# Patient Record
Sex: MALE | Race: WHITE | NOT HISPANIC OR LATINO | Employment: FULL TIME | URBAN - METROPOLITAN AREA
[De-identification: names, ages, dates, MRNs, and addresses within clinical notes are randomized per-mention and may not be internally consistent; named-entity substitution may affect disease eponyms.]

---

## 2017-01-30 ENCOUNTER — AMBULATORY - HEALTHEAST (OUTPATIENT)
Dept: LAB | Facility: CLINIC | Age: 57
End: 2017-01-30

## 2017-01-30 ENCOUNTER — COMMUNICATION - HEALTHEAST (OUTPATIENT)
Dept: TELEHEALTH | Facility: CLINIC | Age: 57
End: 2017-01-30

## 2017-01-30 DIAGNOSIS — L40.50 PSORIATIC ARTHROPATHY (H): ICD-10-CM

## 2017-01-30 LAB
ALT SERPL W P-5'-P-CCNC: 16 U/L (ref 0–45)
CREAT SERPL-MCNC: 0.73 MG/DL (ref 0.7–1.3)
GFR SERPL CREATININE-BSD FRML MDRD: >60 ML/MIN/1.73M2

## 2017-02-19 ENCOUNTER — COMMUNICATION - HEALTHEAST (OUTPATIENT)
Dept: RHEUMATOLOGY | Facility: CLINIC | Age: 57
End: 2017-02-19

## 2017-02-19 DIAGNOSIS — L40.50 PSORIATIC ARTHROPATHY (H): ICD-10-CM

## 2017-02-21 ENCOUNTER — COMMUNICATION - HEALTHEAST (OUTPATIENT)
Dept: ADMINISTRATIVE | Facility: CLINIC | Age: 57
End: 2017-02-21

## 2017-03-17 ENCOUNTER — COMMUNICATION - HEALTHEAST (OUTPATIENT)
Dept: INTERNAL MEDICINE | Facility: CLINIC | Age: 57
End: 2017-03-17

## 2017-03-17 DIAGNOSIS — L40.9 PSORIASIS: ICD-10-CM

## 2017-03-28 ENCOUNTER — OFFICE VISIT - HEALTHEAST (OUTPATIENT)
Dept: INTERNAL MEDICINE | Facility: CLINIC | Age: 57
End: 2017-03-28

## 2017-03-28 DIAGNOSIS — L25.9 DERMATITIS, CONTACT: ICD-10-CM

## 2017-03-28 ASSESSMENT — MIFFLIN-ST. JEOR: SCORE: 1534.74

## 2017-04-10 ENCOUNTER — COMMUNICATION - HEALTHEAST (OUTPATIENT)
Dept: ADMINISTRATIVE | Facility: CLINIC | Age: 57
End: 2017-04-10

## 2017-04-10 ENCOUNTER — AMBULATORY - HEALTHEAST (OUTPATIENT)
Dept: LAB | Facility: CLINIC | Age: 57
End: 2017-04-10

## 2017-04-10 DIAGNOSIS — L40.50 PSORIATIC ARTHROPATHY (H): ICD-10-CM

## 2017-04-10 LAB
ALT SERPL W P-5'-P-CCNC: 15 U/L (ref 0–45)
CREAT SERPL-MCNC: 0.72 MG/DL (ref 0.7–1.3)
GFR SERPL CREATININE-BSD FRML MDRD: >60 ML/MIN/1.73M2

## 2017-04-12 ENCOUNTER — COMMUNICATION - HEALTHEAST (OUTPATIENT)
Dept: RHEUMATOLOGY | Facility: CLINIC | Age: 57
End: 2017-04-12

## 2017-04-12 DIAGNOSIS — L40.50 PSORIATIC ARTHROPATHY (H): ICD-10-CM

## 2017-04-17 ENCOUNTER — RECORDS - HEALTHEAST (OUTPATIENT)
Dept: ADMINISTRATIVE | Facility: OTHER | Age: 57
End: 2017-04-17

## 2017-04-18 ENCOUNTER — COMMUNICATION - HEALTHEAST (OUTPATIENT)
Dept: INTERNAL MEDICINE | Facility: CLINIC | Age: 57
End: 2017-04-18

## 2017-04-20 ENCOUNTER — OFFICE VISIT - HEALTHEAST (OUTPATIENT)
Dept: INTERNAL MEDICINE | Facility: CLINIC | Age: 57
End: 2017-04-20

## 2017-04-20 DIAGNOSIS — Z79.899 HIGH RISK MEDICATION USE: ICD-10-CM

## 2017-04-20 DIAGNOSIS — L40.8 OTHER PSORIASIS: ICD-10-CM

## 2017-04-20 DIAGNOSIS — L40.50 PSORIATIC ARTHROPATHY (H): ICD-10-CM

## 2017-04-20 DIAGNOSIS — M25.50 POLYARTHRALGIA: ICD-10-CM

## 2017-04-20 DIAGNOSIS — H35.713 CENTRAL SEROUS RETINOPATHY, BILATERAL: ICD-10-CM

## 2017-04-20 ASSESSMENT — MIFFLIN-ST. JEOR: SCORE: 1555.61

## 2017-04-24 ENCOUNTER — OFFICE VISIT - HEALTHEAST (OUTPATIENT)
Dept: RHEUMATOLOGY | Facility: CLINIC | Age: 57
End: 2017-04-24

## 2017-04-24 DIAGNOSIS — Z79.899 HIGH RISK MEDICATION USE: ICD-10-CM

## 2017-04-24 DIAGNOSIS — H35.713 CENTRAL SEROUS RETINOPATHY, BILATERAL: ICD-10-CM

## 2017-04-24 DIAGNOSIS — L40.8 OTHER PSORIASIS: ICD-10-CM

## 2017-04-24 DIAGNOSIS — L40.50 PSORIATIC ARTHROPATHY (H): ICD-10-CM

## 2017-05-23 ENCOUNTER — COMMUNICATION - HEALTHEAST (OUTPATIENT)
Dept: ADMINISTRATIVE | Facility: CLINIC | Age: 57
End: 2017-05-23

## 2017-06-05 ENCOUNTER — OFFICE VISIT - HEALTHEAST (OUTPATIENT)
Dept: INTERNAL MEDICINE | Facility: CLINIC | Age: 57
End: 2017-06-05

## 2017-06-05 DIAGNOSIS — F79 INTELLECTUAL FUNCTIONING DISABILITY: ICD-10-CM

## 2017-06-05 DIAGNOSIS — L40.8 OTHER PSORIASIS: ICD-10-CM

## 2017-06-05 DIAGNOSIS — L40.9 PSORIASIS: ICD-10-CM

## 2017-06-05 DIAGNOSIS — L40.50 PSORIATIC ARTHROPATHY (H): ICD-10-CM

## 2017-06-12 ENCOUNTER — COMMUNICATION - HEALTHEAST (OUTPATIENT)
Dept: ADMINISTRATIVE | Facility: CLINIC | Age: 57
End: 2017-06-12

## 2017-06-19 ENCOUNTER — RECORDS - HEALTHEAST (OUTPATIENT)
Dept: ADMINISTRATIVE | Facility: OTHER | Age: 57
End: 2017-06-19

## 2017-07-10 ENCOUNTER — COMMUNICATION - HEALTHEAST (OUTPATIENT)
Dept: RHEUMATOLOGY | Facility: CLINIC | Age: 57
End: 2017-07-10

## 2017-07-10 DIAGNOSIS — L40.50 PSORIATIC ARTHROPATHY (H): ICD-10-CM

## 2017-11-06 ENCOUNTER — RECORDS - HEALTHEAST (OUTPATIENT)
Dept: ADMINISTRATIVE | Facility: OTHER | Age: 57
End: 2017-11-06

## 2017-11-24 ENCOUNTER — OFFICE VISIT - HEALTHEAST (OUTPATIENT)
Dept: INTERNAL MEDICINE | Facility: CLINIC | Age: 57
End: 2017-11-24

## 2017-11-24 DIAGNOSIS — E55.9 VITAMIN D DEFICIENCY: ICD-10-CM

## 2017-11-24 DIAGNOSIS — Z00.00 HEALTHCARE MAINTENANCE: ICD-10-CM

## 2017-11-24 DIAGNOSIS — E78.5 HYPERLIPEMIA: ICD-10-CM

## 2017-11-24 LAB — LDLC SERPL CALC-MCNC: 153 MG/DL

## 2017-11-24 ASSESSMENT — MIFFLIN-ST. JEOR: SCORE: 1568.76

## 2017-11-27 ENCOUNTER — COMMUNICATION - HEALTHEAST (OUTPATIENT)
Dept: INTERNAL MEDICINE | Facility: CLINIC | Age: 57
End: 2017-11-27

## 2018-02-27 ENCOUNTER — OFFICE VISIT - HEALTHEAST (OUTPATIENT)
Dept: INTERNAL MEDICINE | Facility: CLINIC | Age: 58
End: 2018-02-27

## 2018-02-27 DIAGNOSIS — H35.713 CENTRAL SEROUS RETINOPATHY, BILATERAL: ICD-10-CM

## 2018-02-27 DIAGNOSIS — F79 INTELLECTUAL FUNCTIONING DISABILITY: ICD-10-CM

## 2018-02-27 DIAGNOSIS — L40.50 PSORIATIC ARTHROPATHY (H): ICD-10-CM

## 2018-02-27 ASSESSMENT — MIFFLIN-ST. JEOR: SCORE: 1553.74

## 2018-03-12 ENCOUNTER — RECORDS - HEALTHEAST (OUTPATIENT)
Dept: ADMINISTRATIVE | Facility: OTHER | Age: 58
End: 2018-03-12

## 2018-04-23 ENCOUNTER — COMMUNICATION - HEALTHEAST (OUTPATIENT)
Dept: INTERNAL MEDICINE | Facility: CLINIC | Age: 58
End: 2018-04-23

## 2018-05-07 ENCOUNTER — RECORDS - HEALTHEAST (OUTPATIENT)
Dept: ADMINISTRATIVE | Facility: OTHER | Age: 58
End: 2018-05-07

## 2018-05-21 ENCOUNTER — OFFICE VISIT - HEALTHEAST (OUTPATIENT)
Dept: INTERNAL MEDICINE | Facility: CLINIC | Age: 58
End: 2018-05-21

## 2018-05-21 DIAGNOSIS — M25.70 BONE CALLUS: ICD-10-CM

## 2018-05-21 DIAGNOSIS — H54.61 DECREASED VISION OF RIGHT EYE: ICD-10-CM

## 2018-09-10 ENCOUNTER — RECORDS - HEALTHEAST (OUTPATIENT)
Dept: ADMINISTRATIVE | Facility: OTHER | Age: 58
End: 2018-09-10

## 2018-10-02 ENCOUNTER — OFFICE VISIT - HEALTHEAST (OUTPATIENT)
Dept: INTERNAL MEDICINE | Facility: CLINIC | Age: 58
End: 2018-10-02

## 2018-10-02 DIAGNOSIS — F41.1 ANXIETY STATE: ICD-10-CM

## 2018-10-02 DIAGNOSIS — L40.50 PSORIATIC ARTHROPATHY (H): ICD-10-CM

## 2018-10-02 DIAGNOSIS — Z01.30 BLOOD PRESSURE CHECK: ICD-10-CM

## 2018-10-02 DIAGNOSIS — E78.5 HYPERLIPEMIA: ICD-10-CM

## 2018-10-02 DIAGNOSIS — H54.61 DECREASED VISION OF RIGHT EYE: ICD-10-CM

## 2018-10-02 DIAGNOSIS — H35.713 CENTRAL SEROUS RETINOPATHY, BILATERAL: ICD-10-CM

## 2018-10-02 LAB
ALBUMIN SERPL-MCNC: 3.9 G/DL (ref 3.5–5)
ALP SERPL-CCNC: 64 U/L (ref 45–120)
ALT SERPL W P-5'-P-CCNC: 15 U/L (ref 0–45)
ANION GAP SERPL CALCULATED.3IONS-SCNC: 10 MMOL/L (ref 5–18)
AST SERPL W P-5'-P-CCNC: 24 U/L (ref 0–40)
BILIRUB SERPL-MCNC: 0.4 MG/DL (ref 0–1)
BUN SERPL-MCNC: 28 MG/DL (ref 8–22)
CALCIUM SERPL-MCNC: 9.4 MG/DL (ref 8.5–10.5)
CHLORIDE BLD-SCNC: 103 MMOL/L (ref 98–107)
CO2 SERPL-SCNC: 26 MMOL/L (ref 22–31)
CREAT SERPL-MCNC: 0.88 MG/DL (ref 0.7–1.3)
ERYTHROCYTE [DISTWIDTH] IN BLOOD BY AUTOMATED COUNT: 12.4 % (ref 11–14.5)
GFR SERPL CREATININE-BSD FRML MDRD: >60 ML/MIN/1.73M2
GLUCOSE BLD-MCNC: 96 MG/DL (ref 70–125)
HCT VFR BLD AUTO: 41.9 % (ref 40–54)
HGB BLD-MCNC: 13.6 G/DL (ref 14–18)
MCH RBC QN AUTO: 28.4 PG (ref 27–34)
MCHC RBC AUTO-ENTMCNC: 32.5 G/DL (ref 32–36)
MCV RBC AUTO: 88 FL (ref 80–100)
PLATELET # BLD AUTO: 294 THOU/UL (ref 140–440)
PMV BLD AUTO: 9.9 FL (ref 8.5–12.5)
POTASSIUM BLD-SCNC: 4.2 MMOL/L (ref 3.5–5)
PROT SERPL-MCNC: 7.3 G/DL (ref 6–8)
RBC # BLD AUTO: 4.79 MILL/UL (ref 4.4–6.2)
SODIUM SERPL-SCNC: 139 MMOL/L (ref 136–145)
WBC: 6.6 THOU/UL (ref 4–11)

## 2018-10-03 ENCOUNTER — COMMUNICATION - HEALTHEAST (OUTPATIENT)
Dept: INTERNAL MEDICINE | Facility: CLINIC | Age: 58
End: 2018-10-03

## 2019-01-09 ENCOUNTER — RECORDS - HEALTHEAST (OUTPATIENT)
Dept: LAB | Facility: CLINIC | Age: 59
End: 2019-01-09

## 2019-01-09 LAB
HBV SURFACE AG SERPL QL IA: NEGATIVE
HCV AB SERPL QL IA: NEGATIVE
HIV 1+2 AB+HIV1 P24 AG SERPL QL IA: NEGATIVE

## 2019-01-28 ENCOUNTER — OFFICE VISIT - HEALTHEAST (OUTPATIENT)
Dept: INTERNAL MEDICINE | Facility: CLINIC | Age: 59
End: 2019-01-28

## 2019-01-28 DIAGNOSIS — L40.8 OTHER PSORIASIS: ICD-10-CM

## 2019-01-28 DIAGNOSIS — L40.50 PSORIATIC ARTHROPATHY (H): ICD-10-CM

## 2019-01-28 DIAGNOSIS — Z12.5 SCREENING FOR PROSTATE CANCER: ICD-10-CM

## 2019-01-28 DIAGNOSIS — F41.1 ANXIETY STATE: ICD-10-CM

## 2019-01-28 LAB
ALBUMIN SERPL-MCNC: 4 G/DL (ref 3.5–5)
ALBUMIN UR-MCNC: NEGATIVE MG/DL
ALP SERPL-CCNC: 71 U/L (ref 45–120)
ALT SERPL W P-5'-P-CCNC: 12 U/L (ref 0–45)
ANION GAP SERPL CALCULATED.3IONS-SCNC: 10 MMOL/L (ref 5–18)
APPEARANCE UR: CLEAR
AST SERPL W P-5'-P-CCNC: 20 U/L (ref 0–40)
BACTERIA #/AREA URNS HPF: ABNORMAL HPF
BILIRUB SERPL-MCNC: 0.6 MG/DL (ref 0–1)
BILIRUB UR QL STRIP: NEGATIVE
BUN SERPL-MCNC: 25 MG/DL (ref 8–22)
C REACTIVE PROTEIN LHE: 0.2 MG/DL (ref 0–0.8)
CALCIUM SERPL-MCNC: 9.4 MG/DL (ref 8.5–10.5)
CHLORIDE BLD-SCNC: 104 MMOL/L (ref 98–107)
CO2 SERPL-SCNC: 29 MMOL/L (ref 22–31)
COLOR UR AUTO: YELLOW
CREAT SERPL-MCNC: 0.81 MG/DL (ref 0.7–1.3)
ERYTHROCYTE [DISTWIDTH] IN BLOOD BY AUTOMATED COUNT: 12.2 % (ref 11–14.5)
ERYTHROCYTE [SEDIMENTATION RATE] IN BLOOD BY WESTERGREN METHOD: 9 MM/HR (ref 0–15)
GFR SERPL CREATININE-BSD FRML MDRD: >60 ML/MIN/1.73M2
GLUCOSE BLD-MCNC: 62 MG/DL (ref 70–125)
GLUCOSE UR STRIP-MCNC: NEGATIVE MG/DL
HCT VFR BLD AUTO: 42.4 % (ref 40–54)
HGB BLD-MCNC: 14.3 G/DL (ref 14–18)
HGB UR QL STRIP: ABNORMAL
KETONES UR STRIP-MCNC: NEGATIVE MG/DL
LEUKOCYTE ESTERASE UR QL STRIP: NEGATIVE
MCH RBC QN AUTO: 28.6 PG (ref 27–34)
MCHC RBC AUTO-ENTMCNC: 33.8 G/DL (ref 32–36)
MCV RBC AUTO: 85 FL (ref 80–100)
NITRATE UR QL: NEGATIVE
PH UR STRIP: 6 [PH] (ref 5–8)
PLATELET # BLD AUTO: 290 THOU/UL (ref 140–440)
PMV BLD AUTO: 7.2 FL (ref 7–10)
POTASSIUM BLD-SCNC: 4.3 MMOL/L (ref 3.5–5)
PROT SERPL-MCNC: 7.3 G/DL (ref 6–8)
PSA SERPL-MCNC: 1.7 NG/ML (ref 0–3.5)
RBC # BLD AUTO: 5.01 MILL/UL (ref 4.4–6.2)
RBC #/AREA URNS AUTO: ABNORMAL HPF
SODIUM SERPL-SCNC: 143 MMOL/L (ref 136–145)
SP GR UR STRIP: >=1.03 (ref 1–1.03)
SQUAMOUS #/AREA URNS AUTO: ABNORMAL LPF
UROBILINOGEN UR STRIP-ACNC: ABNORMAL
WBC #/AREA URNS AUTO: ABNORMAL HPF
WBC: 5.9 THOU/UL (ref 4–11)

## 2019-01-28 ASSESSMENT — MIFFLIN-ST. JEOR: SCORE: 1579.24

## 2019-01-29 ENCOUNTER — COMMUNICATION - HEALTHEAST (OUTPATIENT)
Dept: INTERNAL MEDICINE | Facility: CLINIC | Age: 59
End: 2019-01-29

## 2019-02-25 ENCOUNTER — RECORDS - HEALTHEAST (OUTPATIENT)
Dept: ADMINISTRATIVE | Facility: OTHER | Age: 59
End: 2019-02-25

## 2019-03-07 ENCOUNTER — OFFICE VISIT - HEALTHEAST (OUTPATIENT)
Dept: INTERNAL MEDICINE | Facility: CLINIC | Age: 59
End: 2019-03-07

## 2019-03-07 DIAGNOSIS — Z23 NEED FOR TD VACCINE: ICD-10-CM

## 2019-03-07 DIAGNOSIS — H35.713 CENTRAL SEROUS RETINOPATHY, BILATERAL: ICD-10-CM

## 2019-03-07 DIAGNOSIS — H54.61 DECREASED VISION OF RIGHT EYE: ICD-10-CM

## 2019-03-07 DIAGNOSIS — L40.50 PSORIATIC ARTHROPATHY (H): ICD-10-CM

## 2019-03-12 ENCOUNTER — COMMUNICATION - HEALTHEAST (OUTPATIENT)
Dept: INTERNAL MEDICINE | Facility: CLINIC | Age: 59
End: 2019-03-12

## 2019-04-04 ENCOUNTER — OFFICE VISIT - HEALTHEAST (OUTPATIENT)
Dept: INTERNAL MEDICINE | Facility: CLINIC | Age: 59
End: 2019-04-04

## 2019-04-04 DIAGNOSIS — H35.713 CENTRAL SEROUS RETINOPATHY, BILATERAL: ICD-10-CM

## 2019-04-04 DIAGNOSIS — K58.8 OTHER IRRITABLE BOWEL SYNDROME: ICD-10-CM

## 2019-04-04 LAB
ANION GAP SERPL CALCULATED.3IONS-SCNC: 9 MMOL/L (ref 5–18)
BUN SERPL-MCNC: 28 MG/DL (ref 8–22)
CALCIUM SERPL-MCNC: 9.6 MG/DL (ref 8.5–10.5)
CHLORIDE BLD-SCNC: 103 MMOL/L (ref 98–107)
CO2 SERPL-SCNC: 29 MMOL/L (ref 22–31)
CREAT SERPL-MCNC: 0.8 MG/DL (ref 0.7–1.3)
GFR SERPL CREATININE-BSD FRML MDRD: >60 ML/MIN/1.73M2
GLUCOSE BLD-MCNC: 60 MG/DL (ref 70–125)
POTASSIUM BLD-SCNC: 4.7 MMOL/L (ref 3.5–5)
SODIUM SERPL-SCNC: 141 MMOL/L (ref 136–145)

## 2019-04-04 ASSESSMENT — MIFFLIN-ST. JEOR: SCORE: 1558.32

## 2019-05-02 ENCOUNTER — OFFICE VISIT - HEALTHEAST (OUTPATIENT)
Dept: INTERNAL MEDICINE | Facility: CLINIC | Age: 59
End: 2019-05-02

## 2019-05-02 DIAGNOSIS — L40.50 PSORIATIC ARTHROPATHY (H): ICD-10-CM

## 2019-05-02 ASSESSMENT — MIFFLIN-ST. JEOR: SCORE: 1530.21

## 2019-05-06 ENCOUNTER — RECORDS - HEALTHEAST (OUTPATIENT)
Dept: ADMINISTRATIVE | Facility: OTHER | Age: 59
End: 2019-05-06

## 2019-05-30 ENCOUNTER — OFFICE VISIT - HEALTHEAST (OUTPATIENT)
Dept: INTERNAL MEDICINE | Facility: CLINIC | Age: 59
End: 2019-05-30

## 2019-05-30 DIAGNOSIS — R25.2 MUSCLE CRAMPING: ICD-10-CM

## 2019-05-30 DIAGNOSIS — H35.713 CENTRAL SEROUS RETINOPATHY, BILATERAL: ICD-10-CM

## 2019-05-30 DIAGNOSIS — L40.50 PSORIATIC ARTHROPATHY (H): ICD-10-CM

## 2019-05-30 ASSESSMENT — MIFFLIN-ST. JEOR: SCORE: 1557.42

## 2019-07-08 ENCOUNTER — RECORDS - HEALTHEAST (OUTPATIENT)
Dept: ADMINISTRATIVE | Facility: OTHER | Age: 59
End: 2019-07-08

## 2019-07-11 ENCOUNTER — OFFICE VISIT - HEALTHEAST (OUTPATIENT)
Dept: INTERNAL MEDICINE | Facility: CLINIC | Age: 59
End: 2019-07-11

## 2019-07-11 ENCOUNTER — OFFICE VISIT - HEALTHEAST (OUTPATIENT)
Dept: RHEUMATOLOGY | Facility: CLINIC | Age: 59
End: 2019-07-11

## 2019-07-11 ENCOUNTER — RECORDS - HEALTHEAST (OUTPATIENT)
Dept: GENERAL RADIOLOGY | Facility: CLINIC | Age: 59
End: 2019-07-11

## 2019-07-11 DIAGNOSIS — R25.2 MUSCLE CRAMPS AT NIGHT: ICD-10-CM

## 2019-07-11 DIAGNOSIS — L40.50 PSORIATIC ARTHRITIS (H): ICD-10-CM

## 2019-07-11 DIAGNOSIS — L40.50 ARTHROPATHIC PSORIASIS, UNSPECIFIED (H): ICD-10-CM

## 2019-07-11 DIAGNOSIS — M79.641 PAIN IN BOTH HANDS: ICD-10-CM

## 2019-07-11 DIAGNOSIS — Z79.1 NSAID LONG-TERM USE: ICD-10-CM

## 2019-07-11 DIAGNOSIS — M79.642 PAIN IN BOTH HANDS: ICD-10-CM

## 2019-07-11 DIAGNOSIS — L40.9 PSORIASIS: ICD-10-CM

## 2019-07-11 DIAGNOSIS — M79.642 PAIN IN LEFT HAND: ICD-10-CM

## 2019-07-11 DIAGNOSIS — H35.713 CENTRAL SEROUS RETINOPATHY, BILATERAL: ICD-10-CM

## 2019-07-11 DIAGNOSIS — L40.50 PSORIATIC ARTHROPATHY (H): ICD-10-CM

## 2019-07-11 DIAGNOSIS — M79.641 PAIN IN RIGHT HAND: ICD-10-CM

## 2019-07-11 LAB
ALBUMIN SERPL-MCNC: 4 G/DL (ref 3.5–5)
ALT SERPL W P-5'-P-CCNC: 14 U/L (ref 0–45)
ANION GAP SERPL CALCULATED.3IONS-SCNC: 12 MMOL/L (ref 5–18)
AST SERPL W P-5'-P-CCNC: 21 U/L (ref 0–40)
BUN SERPL-MCNC: 26 MG/DL (ref 8–22)
C REACTIVE PROTEIN LHE: 0.1 MG/DL (ref 0–0.8)
CALCIUM SERPL-MCNC: 9.6 MG/DL (ref 8.5–10.5)
CHLORIDE BLD-SCNC: 102 MMOL/L (ref 98–107)
CO2 SERPL-SCNC: 23 MMOL/L (ref 22–31)
CREAT SERPL-MCNC: 0.86 MG/DL (ref 0.7–1.3)
ERYTHROCYTE [SEDIMENTATION RATE] IN BLOOD BY WESTERGREN METHOD: 8 MM/HR (ref 0–15)
GFR SERPL CREATININE-BSD FRML MDRD: >60 ML/MIN/1.73M2
GLUCOSE BLD-MCNC: 122 MG/DL (ref 70–125)
POTASSIUM BLD-SCNC: 4.1 MMOL/L (ref 3.5–5)
SODIUM SERPL-SCNC: 137 MMOL/L (ref 136–145)

## 2019-07-11 ASSESSMENT — MIFFLIN-ST. JEOR: SCORE: 1554.3

## 2019-07-15 ENCOUNTER — COMMUNICATION - HEALTHEAST (OUTPATIENT)
Dept: RHEUMATOLOGY | Facility: CLINIC | Age: 59
End: 2019-07-15

## 2019-07-16 LAB
B LOCUS: NORMAL
B27TEST METHOD: NORMAL

## 2019-07-22 ENCOUNTER — COMMUNICATION - HEALTHEAST (OUTPATIENT)
Dept: RHEUMATOLOGY | Facility: CLINIC | Age: 59
End: 2019-07-22

## 2019-07-25 ENCOUNTER — OFFICE VISIT - HEALTHEAST (OUTPATIENT)
Dept: INTERNAL MEDICINE | Facility: CLINIC | Age: 59
End: 2019-07-25

## 2019-07-25 DIAGNOSIS — H35.713 CENTRAL SEROUS RETINOPATHY, BILATERAL: ICD-10-CM

## 2019-07-25 DIAGNOSIS — M79.641 PAIN IN BOTH HANDS: ICD-10-CM

## 2019-07-25 DIAGNOSIS — L40.50 PSORIATIC ARTHRITIS (H): ICD-10-CM

## 2019-07-25 DIAGNOSIS — M79.642 PAIN IN BOTH HANDS: ICD-10-CM

## 2019-07-25 ASSESSMENT — MIFFLIN-ST. JEOR: SCORE: 1552.89

## 2019-07-29 ENCOUNTER — COMMUNICATION - HEALTHEAST (OUTPATIENT)
Dept: INTERNAL MEDICINE | Facility: CLINIC | Age: 59
End: 2019-07-29

## 2019-08-16 ENCOUNTER — COMMUNICATION - HEALTHEAST (OUTPATIENT)
Dept: SCHEDULING | Facility: CLINIC | Age: 59
End: 2019-08-16

## 2019-08-22 ENCOUNTER — OFFICE VISIT - HEALTHEAST (OUTPATIENT)
Dept: INTERNAL MEDICINE | Facility: CLINIC | Age: 59
End: 2019-08-22

## 2019-08-22 DIAGNOSIS — H35.713 CENTRAL SEROUS RETINOPATHY, BILATERAL: ICD-10-CM

## 2019-08-22 DIAGNOSIS — L40.50 PSORIATIC ARTHROPATHY (H): ICD-10-CM

## 2019-08-22 DIAGNOSIS — R25.2 MUSCLE CRAMPING: ICD-10-CM

## 2019-08-22 LAB
ANION GAP SERPL CALCULATED.3IONS-SCNC: 8 MMOL/L (ref 5–18)
BUN SERPL-MCNC: 31 MG/DL (ref 8–22)
CALCIUM SERPL-MCNC: 9.3 MG/DL (ref 8.5–10.5)
CHLORIDE BLD-SCNC: 103 MMOL/L (ref 98–107)
CO2 SERPL-SCNC: 30 MMOL/L (ref 22–31)
CREAT SERPL-MCNC: 0.79 MG/DL (ref 0.7–1.3)
GFR SERPL CREATININE-BSD FRML MDRD: >60 ML/MIN/1.73M2
GLUCOSE BLD-MCNC: 73 MG/DL (ref 70–125)
MAGNESIUM SERPL-MCNC: 1.8 MG/DL (ref 1.8–2.6)
POTASSIUM BLD-SCNC: 4.7 MMOL/L (ref 3.5–5)
SODIUM SERPL-SCNC: 141 MMOL/L (ref 136–145)

## 2019-08-22 ASSESSMENT — MIFFLIN-ST. JEOR: SCORE: 1548.92

## 2019-10-07 ENCOUNTER — COMMUNICATION - HEALTHEAST (OUTPATIENT)
Dept: LAB | Facility: CLINIC | Age: 59
End: 2019-10-07

## 2019-10-07 ENCOUNTER — AMBULATORY - HEALTHEAST (OUTPATIENT)
Dept: LAB | Facility: CLINIC | Age: 59
End: 2019-10-07

## 2019-10-07 DIAGNOSIS — Z13.220 SCREENING FOR HYPERCHOLESTEROLEMIA: ICD-10-CM

## 2019-10-07 DIAGNOSIS — L40.50 PSORIATIC ARTHRITIS (H): ICD-10-CM

## 2019-10-07 DIAGNOSIS — Z13.220 SCREENING FOR HYPERLIPIDEMIA: ICD-10-CM

## 2019-10-07 DIAGNOSIS — Z79.1 NSAID LONG-TERM USE: ICD-10-CM

## 2019-10-07 LAB
ALBUMIN SERPL-MCNC: 3.9 G/DL (ref 3.5–5)
ALT SERPL W P-5'-P-CCNC: 16 U/L (ref 0–45)
AST SERPL W P-5'-P-CCNC: 22 U/L (ref 0–40)
CREAT SERPL-MCNC: 0.79 MG/DL (ref 0.7–1.3)
GFR SERPL CREATININE-BSD FRML MDRD: >60 ML/MIN/1.73M2

## 2019-10-14 ENCOUNTER — COMMUNICATION - HEALTHEAST (OUTPATIENT)
Dept: RHEUMATOLOGY | Facility: CLINIC | Age: 59
End: 2019-10-14

## 2019-10-17 ENCOUNTER — AMBULATORY - HEALTHEAST (OUTPATIENT)
Dept: LAB | Facility: CLINIC | Age: 59
End: 2019-10-17

## 2019-10-17 DIAGNOSIS — Z13.220 SCREENING FOR LIPOID DISORDERS: ICD-10-CM

## 2019-10-17 LAB
CHOLEST SERPL-MCNC: 228 MG/DL
FASTING STATUS PATIENT QL REPORTED: YES
HDLC SERPL-MCNC: 55 MG/DL
LDLC SERPL CALC-MCNC: 158 MG/DL
TRIGL SERPL-MCNC: 77 MG/DL

## 2019-10-25 ENCOUNTER — COMMUNICATION - HEALTHEAST (OUTPATIENT)
Dept: RHEUMATOLOGY | Facility: CLINIC | Age: 59
End: 2019-10-25

## 2019-10-25 DIAGNOSIS — M79.641 PAIN IN BOTH HANDS: ICD-10-CM

## 2019-10-25 DIAGNOSIS — M79.642 PAIN IN BOTH HANDS: ICD-10-CM

## 2019-10-25 DIAGNOSIS — L40.50 PSORIATIC ARTHRITIS (H): ICD-10-CM

## 2019-10-31 ENCOUNTER — COMMUNICATION - HEALTHEAST (OUTPATIENT)
Dept: SCHEDULING | Facility: CLINIC | Age: 59
End: 2019-10-31

## 2019-10-31 ENCOUNTER — OFFICE VISIT - HEALTHEAST (OUTPATIENT)
Dept: RHEUMATOLOGY | Facility: CLINIC | Age: 59
End: 2019-10-31

## 2019-10-31 ENCOUNTER — OFFICE VISIT - HEALTHEAST (OUTPATIENT)
Dept: INTERNAL MEDICINE | Facility: CLINIC | Age: 59
End: 2019-10-31

## 2019-10-31 DIAGNOSIS — H35.713 CENTRAL SEROUS RETINOPATHY, BILATERAL: ICD-10-CM

## 2019-10-31 DIAGNOSIS — Z79.1 NSAID LONG-TERM USE: ICD-10-CM

## 2019-10-31 DIAGNOSIS — L40.50 PSORIATIC ARTHROPATHY (H): ICD-10-CM

## 2019-10-31 DIAGNOSIS — L40.50 PSORIATIC ARTHRITIS (H): ICD-10-CM

## 2019-10-31 DIAGNOSIS — Z87.898 HISTORY OF POLYURIA: ICD-10-CM

## 2019-10-31 DIAGNOSIS — L40.9 PSORIASIS: ICD-10-CM

## 2019-10-31 DIAGNOSIS — R25.2 MUSCLE CRAMPS AT NIGHT: ICD-10-CM

## 2019-10-31 DIAGNOSIS — E78.5 HYPERLIPIDEMIA LDL GOAL <130: ICD-10-CM

## 2019-10-31 DIAGNOSIS — F79 INTELLECTUAL FUNCTIONING DISABILITY: ICD-10-CM

## 2019-10-31 LAB
ALBUMIN UR-MCNC: NEGATIVE MG/DL
ANION GAP SERPL CALCULATED.3IONS-SCNC: 10 MMOL/L (ref 5–18)
APPEARANCE UR: CLEAR
BILIRUB UR QL STRIP: NEGATIVE
BUN SERPL-MCNC: 28 MG/DL (ref 8–22)
CALCIUM SERPL-MCNC: 9.7 MG/DL (ref 8.5–10.5)
CHLORIDE BLD-SCNC: 100 MMOL/L (ref 98–107)
CO2 SERPL-SCNC: 29 MMOL/L (ref 22–31)
COLOR UR AUTO: YELLOW
CREAT SERPL-MCNC: 0.81 MG/DL (ref 0.7–1.3)
GFR SERPL CREATININE-BSD FRML MDRD: >60 ML/MIN/1.73M2
GLUCOSE BLD-MCNC: 54 MG/DL (ref 70–125)
GLUCOSE UR STRIP-MCNC: NEGATIVE MG/DL
HGB UR QL STRIP: NEGATIVE
KETONES UR STRIP-MCNC: ABNORMAL MG/DL
LEUKOCYTE ESTERASE UR QL STRIP: NEGATIVE
NITRATE UR QL: NEGATIVE
PH UR STRIP: 7 [PH] (ref 5–8)
POTASSIUM BLD-SCNC: 5.4 MMOL/L (ref 3.5–5)
SODIUM SERPL-SCNC: 139 MMOL/L (ref 136–145)
SP GR UR STRIP: 1.02 (ref 1–1.03)
UROBILINOGEN UR STRIP-ACNC: ABNORMAL

## 2019-10-31 ASSESSMENT — MIFFLIN-ST. JEOR
SCORE: 1556.06
SCORE: 1552.89

## 2019-12-03 ENCOUNTER — OFFICE VISIT - HEALTHEAST (OUTPATIENT)
Dept: INTERNAL MEDICINE | Facility: CLINIC | Age: 59
End: 2019-12-03

## 2019-12-03 ENCOUNTER — COMMUNICATION - HEALTHEAST (OUTPATIENT)
Dept: RHEUMATOLOGY | Facility: CLINIC | Age: 59
End: 2019-12-03

## 2019-12-03 DIAGNOSIS — R25.2 MUSCLE CRAMP: ICD-10-CM

## 2019-12-03 DIAGNOSIS — G62.9 PERIPHERAL POLYNEUROPATHY: ICD-10-CM

## 2019-12-03 DIAGNOSIS — M79.642 PAIN IN BOTH HANDS: ICD-10-CM

## 2019-12-03 DIAGNOSIS — L40.50 PSORIATIC ARTHRITIS (H): ICD-10-CM

## 2019-12-03 DIAGNOSIS — M79.641 PAIN IN BOTH HANDS: ICD-10-CM

## 2019-12-03 LAB
HBA1C MFR BLD: 5.7 % (ref 3.5–6)
MAGNESIUM SERPL-MCNC: 1.9 MG/DL (ref 1.8–2.6)
T4 FREE SERPL-MCNC: 0.9 NG/DL (ref 0.7–1.8)
TSH SERPL DL<=0.005 MIU/L-ACNC: 2.99 UIU/ML (ref 0.3–5)
VIT B12 SERPL-MCNC: 400 PG/ML (ref 213–816)

## 2019-12-03 ASSESSMENT — MIFFLIN-ST. JEOR: SCORE: 1561.96

## 2019-12-04 LAB — B BURGDOR IGG+IGM SER QL: 0.07 INDEX VALUE

## 2019-12-05 ENCOUNTER — COMMUNICATION - HEALTHEAST (OUTPATIENT)
Dept: INTERNAL MEDICINE | Facility: CLINIC | Age: 59
End: 2019-12-05

## 2019-12-05 LAB
ALBUMIN PERCENT: 54.2 % (ref 51–67)
ALBUMIN SERPL ELPH-MCNC: 4 G/DL (ref 3.2–4.7)
ALPHA 1 PERCENT: 3.1 % (ref 2–4)
ALPHA 2 PERCENT: 13.8 % (ref 5–13)
ALPHA1 GLOB SERPL ELPH-MCNC: 0.2 G/DL (ref 0.1–0.3)
ALPHA2 GLOB SERPL ELPH-MCNC: 1 G/DL (ref 0.4–0.9)
B-GLOBULIN SERPL ELPH-MCNC: 0.9 G/DL (ref 0.7–1.2)
BETA PERCENT: 12.8 % (ref 10–17)
GAMMA GLOB SERPL ELPH-MCNC: 1.2 G/DL (ref 0.6–1.4)
GAMMA GLOBULIN PERCENT: 16.1 % (ref 9–20)
PATH ICD:: ABNORMAL
PROT PATTERN SERPL ELPH-IMP: ABNORMAL
PROT SERPL-MCNC: 7.3 G/DL (ref 6–8)
REVIEWING PATHOLOGIST: ABNORMAL

## 2019-12-10 ENCOUNTER — COMMUNICATION - HEALTHEAST (OUTPATIENT)
Dept: INTERNAL MEDICINE | Facility: CLINIC | Age: 59
End: 2019-12-10

## 2019-12-17 ENCOUNTER — RECORDS - HEALTHEAST (OUTPATIENT)
Dept: ADMINISTRATIVE | Facility: OTHER | Age: 59
End: 2019-12-17

## 2020-01-07 ENCOUNTER — HOSPITAL ENCOUNTER (OUTPATIENT)
Dept: PHYSICAL MEDICINE AND REHAB | Facility: CLINIC | Age: 60
Discharge: HOME | End: 2020-01-07
Attending: INTERNAL MEDICINE

## 2020-01-07 DIAGNOSIS — G62.9 PERIPHERAL POLYNEUROPATHY: ICD-10-CM

## 2020-01-14 ENCOUNTER — RECORDS - HEALTHEAST (OUTPATIENT)
Dept: ADMINISTRATIVE | Facility: OTHER | Age: 60
End: 2020-01-14

## 2020-01-22 ENCOUNTER — OFFICE VISIT - HEALTHEAST (OUTPATIENT)
Dept: INTERNAL MEDICINE | Facility: CLINIC | Age: 60
End: 2020-01-22

## 2020-01-22 DIAGNOSIS — H35.713 CENTRAL SEROUS CHORIORETINOPATHY OF BOTH EYES: ICD-10-CM

## 2020-01-22 DIAGNOSIS — L40.50 PSORIATIC ARTHRITIS (H): ICD-10-CM

## 2020-01-22 DIAGNOSIS — M79.641 PAIN IN BOTH HANDS: ICD-10-CM

## 2020-01-22 DIAGNOSIS — G62.9 PERIPHERAL POLYNEUROPATHY: ICD-10-CM

## 2020-01-22 DIAGNOSIS — N64.4 BREAST TENDERNESS IN MALE: ICD-10-CM

## 2020-01-22 DIAGNOSIS — M79.642 PAIN IN BOTH HANDS: ICD-10-CM

## 2020-01-22 LAB
ANION GAP SERPL CALCULATED.3IONS-SCNC: 7 MMOL/L (ref 5–18)
BUN SERPL-MCNC: 26 MG/DL (ref 8–22)
CALCIUM SERPL-MCNC: 9.6 MG/DL (ref 8.5–10.5)
CHLORIDE BLD-SCNC: 102 MMOL/L (ref 98–107)
CO2 SERPL-SCNC: 33 MMOL/L (ref 22–31)
CREAT SERPL-MCNC: 0.78 MG/DL (ref 0.7–1.3)
GFR SERPL CREATININE-BSD FRML MDRD: >60 ML/MIN/1.73M2
GLUCOSE BLD-MCNC: 80 MG/DL (ref 70–125)
POTASSIUM BLD-SCNC: 5.2 MMOL/L (ref 3.5–5)
SODIUM SERPL-SCNC: 142 MMOL/L (ref 136–145)

## 2020-01-22 ASSESSMENT — MIFFLIN-ST. JEOR: SCORE: 1563.94

## 2020-03-23 ENCOUNTER — COMMUNICATION - HEALTHEAST (OUTPATIENT)
Dept: INTERNAL MEDICINE | Facility: CLINIC | Age: 60
End: 2020-03-23

## 2020-03-27 ENCOUNTER — AMBULATORY - HEALTHEAST (OUTPATIENT)
Dept: LAB | Facility: CLINIC | Age: 60
End: 2020-03-27

## 2020-03-27 DIAGNOSIS — Z79.1 NSAID LONG-TERM USE: ICD-10-CM

## 2020-03-27 DIAGNOSIS — L40.50 PSORIATIC ARTHRITIS (H): ICD-10-CM

## 2020-03-27 LAB
ALBUMIN SERPL-MCNC: 3.8 G/DL (ref 3.5–5)
ALT SERPL W P-5'-P-CCNC: 13 U/L (ref 0–45)
AST SERPL W P-5'-P-CCNC: 19 U/L (ref 0–40)
CREAT SERPL-MCNC: 0.7 MG/DL (ref 0.7–1.3)
GFR SERPL CREATININE-BSD FRML MDRD: >60 ML/MIN/1.73M2

## 2020-04-01 ENCOUNTER — COMMUNICATION - HEALTHEAST (OUTPATIENT)
Dept: RHEUMATOLOGY | Facility: CLINIC | Age: 60
End: 2020-04-01

## 2020-04-14 ENCOUNTER — COMMUNICATION - HEALTHEAST (OUTPATIENT)
Dept: INTERNAL MEDICINE | Facility: CLINIC | Age: 60
End: 2020-04-14

## 2020-04-14 DIAGNOSIS — M79.642 PAIN IN BOTH HANDS: ICD-10-CM

## 2020-04-14 DIAGNOSIS — L40.50 PSORIATIC ARTHRITIS (H): ICD-10-CM

## 2020-04-14 DIAGNOSIS — M79.641 PAIN IN BOTH HANDS: ICD-10-CM

## 2020-04-15 ENCOUNTER — OFFICE VISIT - HEALTHEAST (OUTPATIENT)
Dept: INTERNAL MEDICINE | Facility: CLINIC | Age: 60
End: 2020-04-15

## 2020-04-15 DIAGNOSIS — L40.50 PSORIATIC ARTHROPATHY (H): ICD-10-CM

## 2020-04-15 DIAGNOSIS — H35.713 CENTRAL SEROUS RETINOPATHY, BILATERAL: ICD-10-CM

## 2020-04-21 ENCOUNTER — COMMUNICATION - HEALTHEAST (OUTPATIENT)
Dept: INTERNAL MEDICINE | Facility: CLINIC | Age: 60
End: 2020-04-21

## 2020-05-05 ENCOUNTER — OFFICE VISIT - HEALTHEAST (OUTPATIENT)
Dept: RHEUMATOLOGY | Facility: CLINIC | Age: 60
End: 2020-05-05

## 2020-05-05 DIAGNOSIS — L40.9 PSORIASIS: ICD-10-CM

## 2020-05-05 DIAGNOSIS — L40.50 PSORIATIC ARTHRITIS (H): ICD-10-CM

## 2020-05-05 DIAGNOSIS — Z79.1 NSAID LONG-TERM USE: ICD-10-CM

## 2020-05-06 ENCOUNTER — NURSE TRIAGE (OUTPATIENT)
Dept: NURSING | Facility: CLINIC | Age: 60
End: 2020-05-06

## 2020-05-06 NOTE — TELEPHONE ENCOUNTER
Shay calls and says that he thinks his Meloxicam is causing him headaches and he is not going to take this medication anymore. Pt. Says that he takes this medication for his arthritis. Pt. Says that he does not have a headache right now. Pt. Says that he wants Dr. Jacques to know this. Pt. Says that he can be called back at: [phone]. RN then left this message in Epic as requested. COVID 19 Nurse Triage Plan/Patient Instructions    Please be aware that novel coronavirus (COVID-19) may be circulating in the community. If you develop symptoms such as fever, cough, or SOB or if you have concerns about the presence of another infection including coronavirus (COVID-19), please contact your health care provider or visit [email or web address].     Disposition/Instructions    Patient to stay at home and follow home care protocol based instructions.     Thank you for limiting contact with others, wearing a simple mask to cover your cough, practice good hand hygiene habits and accessing our virtual services where possible to limit the spread of this virus.    For more information about COVID19 and options for caring for yourself at home, please visit the CDC website at https://www.cdc.gov/coronavirus/2019-ncov/about/steps-when-sick.html  For more options for care at [facility], please visit our website at [email or web address]    For more information, please use the [place] Department of Health COVID-19 Website: [email or web address]://www.[email or web address]/diseases/coronavirus/index.html  [place] Department of Health ([facility]) COVID-19 Hotlines (Interpreters available):      Health questions: Phone Number: [phone] or [phone] and Hours: 7 a.m. to 7 p.m.    Schools and  questions: Phone Number: [phone] or [phone] and Hours 7 a.m. to 7 p.m.                  Reason for Disposition    [1] Follow-up call to recent contact AND [2] information only call, no triage  required    Additional Information    Negative: [1] Caller is not with the adult (patient) AND [2] reporting urgent symptoms    Negative: Lab result questions    Negative: Medication questions    Negative: Caller can't be reached by phone    Negative: Caller has already spoken to PCP or another triager    Negative: RN needs further essential information from caller in order to complete triage    Negative: Requesting regular office appointment    Negative: [1] Caller requesting NON-URGENT health information AND [2] PCP's office is the best resource    Negative: Health Information question, no triage required and triager able to answer question    Negative: General information question, no triage required and triager able to answer question    Negative: Question about upcoming scheduled test, no triage required and triager able to answer question    Negative: [1] Caller is not with the adult (patient) AND [2] probable NON-URGENT symptoms    Protocols used: INFORMATION ONLY CALL-A-

## 2020-05-07 ENCOUNTER — COMMUNICATION - HEALTHEAST (OUTPATIENT)
Dept: ADMINISTRATIVE | Facility: CLINIC | Age: 60
End: 2020-05-07

## 2020-05-07 DIAGNOSIS — L40.50 PSORIATIC ARTHROPATHY (H): ICD-10-CM

## 2020-05-22 ENCOUNTER — COMMUNICATION - HEALTHEAST (OUTPATIENT)
Dept: INTERNAL MEDICINE | Facility: CLINIC | Age: 60
End: 2020-05-22

## 2020-11-05 ENCOUNTER — OFFICE VISIT - HEALTHEAST (OUTPATIENT)
Dept: INTERNAL MEDICINE | Facility: CLINIC | Age: 60
End: 2020-11-05

## 2020-11-05 DIAGNOSIS — L40.8 OTHER PSORIASIS: ICD-10-CM

## 2020-11-05 DIAGNOSIS — H35.713 CENTRAL SEROUS RETINOPATHY, BILATERAL: ICD-10-CM

## 2020-11-05 DIAGNOSIS — F79 INTELLECTUAL FUNCTIONING DISABILITY: ICD-10-CM

## 2020-11-05 DIAGNOSIS — E78.5 HYPERLIPIDEMIA LDL GOAL <100: ICD-10-CM

## 2020-11-05 DIAGNOSIS — G62.9 PERIPHERAL POLYNEUROPATHY: ICD-10-CM

## 2020-11-05 DIAGNOSIS — Z12.11 SCREENING FOR COLON CANCER: ICD-10-CM

## 2020-11-05 DIAGNOSIS — L40.50 PSORIATIC ARTHROPATHY (H): ICD-10-CM

## 2020-11-05 DIAGNOSIS — Z00.00 ENCOUNTER FOR GENERAL HEALTH EXAMINATION: ICD-10-CM

## 2020-11-05 DIAGNOSIS — Z12.5 SCREENING FOR PROSTATE CANCER: ICD-10-CM

## 2020-11-05 LAB
ALBUMIN SERPL-MCNC: 4 G/DL (ref 3.5–5)
ALP SERPL-CCNC: 68 U/L (ref 45–120)
ALT SERPL W P-5'-P-CCNC: 18 U/L (ref 0–45)
ANION GAP SERPL CALCULATED.3IONS-SCNC: 9 MMOL/L (ref 5–18)
AST SERPL W P-5'-P-CCNC: 23 U/L (ref 0–40)
BASOPHILS # BLD AUTO: 0 THOU/UL (ref 0–0.2)
BASOPHILS NFR BLD AUTO: 0 % (ref 0–2)
BILIRUB SERPL-MCNC: 0.6 MG/DL (ref 0–1)
BUN SERPL-MCNC: 26 MG/DL (ref 8–22)
CALCIUM SERPL-MCNC: 9 MG/DL (ref 8.5–10.5)
CHLORIDE BLD-SCNC: 105 MMOL/L (ref 98–107)
CHOLEST SERPL-MCNC: 218 MG/DL
CO2 SERPL-SCNC: 28 MMOL/L (ref 22–31)
CREAT SERPL-MCNC: 0.8 MG/DL (ref 0.7–1.3)
EOSINOPHIL # BLD AUTO: 0.1 THOU/UL (ref 0–0.4)
EOSINOPHIL NFR BLD AUTO: 1 % (ref 0–6)
ERYTHROCYTE [DISTWIDTH] IN BLOOD BY AUTOMATED COUNT: 12.5 % (ref 11–14.5)
FASTING STATUS PATIENT QL REPORTED: YES
GFR SERPL CREATININE-BSD FRML MDRD: >60 ML/MIN/1.73M2
GLUCOSE BLD-MCNC: 97 MG/DL (ref 70–125)
HCT VFR BLD AUTO: 42.4 % (ref 40–54)
HDLC SERPL-MCNC: 49 MG/DL
HGB BLD-MCNC: 14.4 G/DL (ref 14–18)
LDLC SERPL CALC-MCNC: 152 MG/DL
LYMPHOCYTES # BLD AUTO: 1.2 THOU/UL (ref 0.8–4.4)
LYMPHOCYTES NFR BLD AUTO: 20 % (ref 20–40)
MCH RBC QN AUTO: 29.6 PG (ref 27–34)
MCHC RBC AUTO-ENTMCNC: 33.8 G/DL (ref 32–36)
MCV RBC AUTO: 87 FL (ref 80–100)
MONOCYTES # BLD AUTO: 0.4 THOU/UL (ref 0–0.9)
MONOCYTES NFR BLD AUTO: 7 % (ref 2–10)
NEUTROPHILS # BLD AUTO: 4 THOU/UL (ref 2–7.7)
NEUTROPHILS NFR BLD AUTO: 71 % (ref 50–70)
PLATELET # BLD AUTO: 271 THOU/UL (ref 140–440)
PMV BLD AUTO: 7.6 FL (ref 7–10)
POTASSIUM BLD-SCNC: 4.2 MMOL/L (ref 3.5–5)
PROT SERPL-MCNC: 6.9 G/DL (ref 6–8)
PSA SERPL-MCNC: 2.4 NG/ML (ref 0–4.5)
RBC # BLD AUTO: 4.85 MILL/UL (ref 4.4–6.2)
SODIUM SERPL-SCNC: 142 MMOL/L (ref 136–145)
TRIGL SERPL-MCNC: 84 MG/DL
WBC: 5.6 THOU/UL (ref 4–11)

## 2020-11-05 ASSESSMENT — MIFFLIN-ST. JEOR: SCORE: 1580.1

## 2020-11-10 ENCOUNTER — OFFICE VISIT - HEALTHEAST (OUTPATIENT)
Dept: RHEUMATOLOGY | Facility: CLINIC | Age: 60
End: 2020-11-10

## 2020-11-10 DIAGNOSIS — M79.641 PAIN IN BOTH HANDS: ICD-10-CM

## 2020-11-10 DIAGNOSIS — L40.50 PSORIATIC ARTHRITIS (H): ICD-10-CM

## 2020-11-10 DIAGNOSIS — M79.642 PAIN IN BOTH HANDS: ICD-10-CM

## 2020-11-10 DIAGNOSIS — L40.9 PSORIASIS: ICD-10-CM

## 2020-11-11 ENCOUNTER — COMMUNICATION - HEALTHEAST (OUTPATIENT)
Dept: RHEUMATOLOGY | Facility: CLINIC | Age: 60
End: 2020-11-11

## 2020-11-17 ENCOUNTER — COMMUNICATION - HEALTHEAST (OUTPATIENT)
Dept: INTERNAL MEDICINE | Facility: CLINIC | Age: 60
End: 2020-11-17

## 2020-11-17 DIAGNOSIS — R35.89 POLYURIA: ICD-10-CM

## 2020-11-20 ENCOUNTER — AMBULATORY - HEALTHEAST (OUTPATIENT)
Dept: LAB | Facility: CLINIC | Age: 60
End: 2020-11-20

## 2020-11-20 DIAGNOSIS — R35.89 POLYURIA: ICD-10-CM

## 2020-11-20 LAB
ALBUMIN UR-MCNC: NEGATIVE MG/DL
APPEARANCE UR: CLEAR
BACTERIA #/AREA URNS HPF: ABNORMAL HPF
BILIRUB UR QL STRIP: NEGATIVE
COLOR UR AUTO: YELLOW
GLUCOSE UR STRIP-MCNC: NEGATIVE MG/DL
HGB UR QL STRIP: ABNORMAL
KETONES UR STRIP-MCNC: NEGATIVE MG/DL
LEUKOCYTE ESTERASE UR QL STRIP: NEGATIVE
NITRATE UR QL: NEGATIVE
PH UR STRIP: 7 [PH] (ref 5–8)
RBC #/AREA URNS AUTO: ABNORMAL HPF
SP GR UR STRIP: 1.02 (ref 1–1.03)
SQUAMOUS #/AREA URNS AUTO: ABNORMAL LPF
UROBILINOGEN UR STRIP-ACNC: ABNORMAL
WBC #/AREA URNS AUTO: ABNORMAL HPF

## 2020-11-23 ENCOUNTER — RECORDS - HEALTHEAST (OUTPATIENT)
Dept: ADMINISTRATIVE | Facility: OTHER | Age: 60
End: 2020-11-23

## 2020-11-23 LAB — COLOGUARD-ABSTRACT: NEGATIVE

## 2020-12-08 ENCOUNTER — RECORDS - HEALTHEAST (OUTPATIENT)
Dept: HEALTH INFORMATION MANAGEMENT | Facility: CLINIC | Age: 60
End: 2020-12-08

## 2020-12-16 ENCOUNTER — OFFICE VISIT - HEALTHEAST (OUTPATIENT)
Dept: INTERNAL MEDICINE | Facility: CLINIC | Age: 60
End: 2020-12-16

## 2020-12-16 DIAGNOSIS — F41.9 ANXIETY: ICD-10-CM

## 2020-12-16 RX ORDER — ESCITALOPRAM OXALATE 10 MG/1
10 TABLET ORAL DAILY
Qty: 30 TABLET | Refills: 5 | Status: SHIPPED | OUTPATIENT
Start: 2020-12-16 | End: 2022-04-04

## 2020-12-16 ASSESSMENT — ANXIETY QUESTIONNAIRES
3. WORRYING TOO MUCH ABOUT DIFFERENT THINGS: NEARLY EVERY DAY
2. NOT BEING ABLE TO STOP OR CONTROL WORRYING: NEARLY EVERY DAY
4. TROUBLE RELAXING: NEARLY EVERY DAY
1. FEELING NERVOUS, ANXIOUS, OR ON EDGE: NEARLY EVERY DAY
5. BEING SO RESTLESS THAT IT IS HARD TO SIT STILL: SEVERAL DAYS
GAD7 TOTAL SCORE: 15
7. FEELING AFRAID AS IF SOMETHING AWFUL MIGHT HAPPEN: MORE THAN HALF THE DAYS
IF YOU CHECKED OFF ANY PROBLEMS ON THIS QUESTIONNAIRE, HOW DIFFICULT HAVE THESE PROBLEMS MADE IT FOR YOU TO DO YOUR WORK, TAKE CARE OF THINGS AT HOME, OR GET ALONG WITH OTHER PEOPLE: EXTREMELY DIFFICULT
6. BECOMING EASILY ANNOYED OR IRRITABLE: NOT AT ALL

## 2020-12-22 ENCOUNTER — COMMUNICATION - HEALTHEAST (OUTPATIENT)
Dept: SCHEDULING | Facility: CLINIC | Age: 60
End: 2020-12-22

## 2020-12-22 ENCOUNTER — OFFICE VISIT - HEALTHEAST (OUTPATIENT)
Dept: FAMILY MEDICINE | Facility: CLINIC | Age: 60
End: 2020-12-22

## 2020-12-22 DIAGNOSIS — R19.7 DIARRHEA, UNSPECIFIED TYPE: ICD-10-CM

## 2020-12-22 RX ORDER — SIMETHICONE 80 MG
80 TABLET,CHEWABLE ORAL 4 TIMES DAILY PRN
Qty: 100 TABLET | Refills: 0 | Status: SHIPPED | OUTPATIENT
Start: 2020-12-22 | End: 2023-02-08

## 2020-12-22 RX ORDER — LOPERAMIDE HYDROCHLORIDE 2 MG/1
TABLET ORAL
Qty: 24 TABLET | Refills: 0 | Status: SHIPPED | OUTPATIENT
Start: 2020-12-22 | End: 2023-02-20

## 2020-12-28 ENCOUNTER — COMMUNICATION - HEALTHEAST (OUTPATIENT)
Dept: SCHEDULING | Facility: CLINIC | Age: 60
End: 2020-12-28

## 2021-01-04 ENCOUNTER — OFFICE VISIT - HEALTHEAST (OUTPATIENT)
Dept: INTERNAL MEDICINE | Facility: CLINIC | Age: 61
End: 2021-01-04

## 2021-01-04 DIAGNOSIS — F41.9 ANXIETY: ICD-10-CM

## 2021-01-04 DIAGNOSIS — K58.8 OTHER IRRITABLE BOWEL SYNDROME: ICD-10-CM

## 2021-03-30 ENCOUNTER — COMMUNICATION - HEALTHEAST (OUTPATIENT)
Dept: INTERNAL MEDICINE | Facility: CLINIC | Age: 61
End: 2021-03-30

## 2021-04-05 ENCOUNTER — OFFICE VISIT - HEALTHEAST (OUTPATIENT)
Dept: INTERNAL MEDICINE | Facility: CLINIC | Age: 61
End: 2021-04-05

## 2021-04-05 DIAGNOSIS — F41.9 ANXIETY: ICD-10-CM

## 2021-04-05 DIAGNOSIS — G62.9 PERIPHERAL POLYNEUROPATHY: ICD-10-CM

## 2021-05-11 ENCOUNTER — COMMUNICATION - HEALTHEAST (OUTPATIENT)
Dept: INTERNAL MEDICINE | Facility: CLINIC | Age: 61
End: 2021-05-11

## 2021-05-11 DIAGNOSIS — F41.9 ANXIETY: ICD-10-CM

## 2021-05-27 VITALS
TEMPERATURE: 98 F | OXYGEN SATURATION: 96 % | DIASTOLIC BLOOD PRESSURE: 74 MMHG | RESPIRATION RATE: 16 BRPM | SYSTOLIC BLOOD PRESSURE: 129 MMHG | HEART RATE: 86 BPM

## 2021-05-27 NOTE — PROGRESS NOTES
ASSESSMENT AND PLAN:    1. Central serous retinopathy, bilateral  He is taking the spironolactone as recommended by his ophthalmologist.  I urged him to continue this, will check BMP  - Basic Metabolic Panel    2. Irritable bowel syndrome  He reports a gaseousness, and feels it is due to spironolactone. We discussed irritable bowel and how unlikely his symptoms are related to the medication.  Reassured.      3. Suspected Autism spectrum personality disorder  He has symptoms and features of personality strongly suggestive of mild spectrum disorder with obsessive thinking and lack of social awareness.  His son has clear severe autism spectrum disorder with disability.      Patient Instructions   1. Continue spironolactone for eye condition.     2. Blood tests today     3. Follow up in 4 months.     4, See eye physician as scheduled.      CHIEF COMPLAINT:  Chief Complaint   Patient presents with     Follow-up     HISTORY OF PRESENT ILLNESS:  Shay Mendosa is a 58 y.o. male presents in follow up after starting the spironolactone.  He feels that this causes gasseousness.  No emesis, or diarrhea, or actual pain.  He has not otherwise noted any side effects..     REVIEW OF SYSTEMS:   See HPI, all other systems on review are negative.  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     Other irritable bowel syndrome 4/4/2019     Psoriasis      Psoriatic arthritis (H)      Vitamin D deficiency      Social History     Tobacco Use   Smoking Status Never Smoker   Smokeless Tobacco Never Used     Family History   Problem Relation Age of Onset     Alcohol abuse Father      Heart disease Father      Past Surgical History:   Procedure Laterality Date     FOOT ARTHRODESIS, MODIFIED STERN Bilateral      VITALS:  Vitals:    04/04/19 0908   BP: 112/70   Patient Site: Left  Arm   Patient Position: Sitting   Cuff Size: Adult Regular   Pulse: (!) 56   SpO2: 98%   Weight: 157 lb 11.2 oz (71.5 kg)   Height: 6' (1.829 m)     Wt Readings from Last 3 Encounters:   04/04/19 157 lb 11.2 oz (71.5 kg)   03/07/19 158 lb 6.4 oz (71.8 kg)   01/28/19 162 lb 5 oz (73.6 kg)     PHYSICAL EXAM:  Constitutional:  In NAD, alert and oriented, calm,   Abdomen:   Soft, flat and non-tender, without guarding, rebound, or mass.    : Normal testes and phallus    DECISION TO OBTAIN OLD RECORDS AND/OR OBTAIN HISTORY FROM SOMEONE OTHER THAN PATIENT, AND/OR ACCESSING CARE EVERYWHERE):  1  0     REVIEW AND SUMMARIZATION OF OLD RECORDS, AND/OR OBTAINING HISTORY FROM SOMEONE OTHER THAN PATIENT, AND/OR DISCUSSION OF CASE WITH ANOTHER HEALTH CARE PROVIDER:  2 0    REVIEW AND/OR ORDER OF OF CLINICAL LAB TESTS: 1  Ordered BMP.    REVIEW AND/OR ORDER OF RADIOLOGY TESTS: 1 0.    REVIEW AND/OR ORDER OF MEDICAL TESTS (EKG/ECHO/COLONOSCOPY/EGD): 1  0    INDEPENDENT  VISUALIZATION OF IMAGE, TRACING, OR SPECIMEN ITSELF (2 EACH):  0     TOTAL: 1    Current Outpatient Medications   Medication Sig Dispense Refill     spironolactone (ALDACTONE) 25 MG tablet Take 1 tablet (25 mg total) by mouth 2 (two) times a day. 60 tablet 11     No current facility-administered medications for this visit.      Samuel Almonte MD  Internal Medicine  [facility]

## 2021-05-27 NOTE — PATIENT INSTRUCTIONS - HE
1. Continue spironolactone for eye condition.     2. Blood tests today     3. Follow up in 4 months.     4, See eye physician as scheduled.

## 2021-05-28 ASSESSMENT — ANXIETY QUESTIONNAIRES: GAD7 TOTAL SCORE: 15

## 2021-05-28 NOTE — PATIENT INSTRUCTIONS - HE
1. Try advil or aleve as directed for symptoms.     2. Referral to rheumatology, Dr. Finkelstein, for second opinion, if fails to do well.     3.  Follow up in 3 months.

## 2021-05-28 NOTE — PROGRESS NOTES
ASSESSMENT AND PLAN:    1. Psoriatic arthropathy   Exam and history are consistent with symptomatic psoriatic hand arthritis.  I encouraged him to seek a second opinion with rheumatology to consider alternative treatment to MTX. I emphasized that untreated arthritis can cause accumulated joint damage that will be less and less responsive to treatment.  He says he will consider this information.  He can treat currently with ibuprofen or naproxen for symptoms.  I cautioned about excess use, and GI side effects.       Patient Instructions   1. Try advil or aleve as directed for symptoms.     2. Referral to rheumatology, Dr. Finkelstein, for second opinion, if fails to do well.     3.  Follow up in 3 months.     CHIEF COMPLAINT:  Chief Complaint   Patient presents with     Mass     lunb on head, denied pain     Hand Pain     Sore on right point finger     HISTORY OF PRESENT ILLNESS:  Shay Mendosa is a 58 y.o. male He has been off MTX and his small hand joints are getting inflammed and thickened. No trauma.   He reads about his medications and is fearful of side effects.  A 'lump' on the side of his head also concerns him.  He otherwise feels well.  No fever, or chills.  No knee, or ankle or feet symptoms.      REVIEW OF SYSTEMS:   See HPI, all other systems on review are negative.    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     Other irritable bowel syndrome 4/4/2019     Psoriasis      Psoriatic arthritis (H)      Vitamin D deficiency      Social History     Tobacco Use   Smoking Status Never Smoker   Smokeless Tobacco Never Used     Family History   Problem Relation Age of Onset     Alcohol abuse Father      Heart disease Father      Past Surgical History:   Procedure Laterality Date     FOOT ARTHRODESIS, MODIFIED  "LEENA Bilateral      VITALS:  Vitals:    05/02/19 0844   BP: 124/70   Patient Site: Left Arm   Patient Position: Sitting   Cuff Size: Adult Regular   Pulse: 78   SpO2: 98%   Weight: 155 lb (70.3 kg)   Height: 5' 11\" (1.803 m)     Wt Readings from Last 3 Encounters:   05/02/19 155 lb (70.3 kg)   04/04/19 157 lb 11.2 oz (71.5 kg)   03/07/19 158 lb 6.4 oz (71.8 kg)     PHYSICAL EXAM:  Constitutional:  In NAD, alert and oriented  HEENT:no abnormal lump is noted.   Extremities: left 2nd, and 3rd MP and PIP joints have synovitis and mild effusions, wrists are negative.     Current Outpatient Medications   Medication Sig Dispense Refill     spironolactone (ALDACTONE) 25 MG tablet Take 1 tablet (25 mg total) by mouth 2 (two) times a day. 60 tablet 11     No current facility-administered medications for this visit.      Samuel Almonte MD  Internal Medicine  [facility]  "

## 2021-05-29 NOTE — PATIENT INSTRUCTIONS - HE
1. Reassured that spironolactone does not cause flatus.  I encouraged him to continue this.     2. Reassured about muscle cramping.     3. Follow up 3 months.     4. Follow up with the eye doctor as scheduled.  July 8, 2019.

## 2021-05-29 NOTE — PROGRESS NOTES
ASSESSMENT AND PLAN:    1. Muscle cramping  Non-specific and diffuse, episodic. Exam negative.  Possible somatization.  Reassured.  Follow     2. Psoriatic arthropathy (H)  Active right MCP joints.  He finds help with ibuprofen. Very reluctant to seek further rheumatologic care.  He feels that MTX has permanently caused side effects.  I tried to clarify that possibility with him, and emphasized that there are other treatments, and he risks long term joint damage if he does not get active treatment. I explained that ibuprofen is only symptomatic treatment.  He seems to agree to referral for second opinion with rheumatology.   - Ambulatory referral to Rheumatology    3. Central serous retinopathy, bilateral  He takes the two times a day spironolactone, is reluctant to double the dose, which is what his eye physician recommends.  I encouraged him to consider this, and to avoid somatization and reading about potential side effects.     Patient Instructions   1. Reassured that spironolactone does not cause flatus.  I encouraged him to continue this.     2. Reassured about muscle cramping.     3. Follow up 3 months.     4. Follow up with the eye doctor as scheduled.  July 8, 2019.      CHIEF COMPLAINT:  Chief Complaint   Patient presents with     Follow-up     HISTORY OF PRESENT ILLNESS:  Shay Mendosa is a 58 y.o. male is here in follow up.  He feels that he is having muscle cramping. This is mostly the legs, but can also occur in the arms.  He doesn't feel it is related to increased muscle activity.  He thinks it due to spironolactone.  He is willing to continue that medication but will not increase it.  No dyspnea, or cough. His right hand has active arthritis.  He finds some benefit from ibuprofen.  He has not had focal arm or leg numbness or weakness.     REVIEW OF SYSTEMS:   See HPI, all other systems on review are negative.    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     Other irritable bowel syndrome 4/4/2019     Psoriasis      Psoriatic arthritis (H)      Vitamin D deficiency      Social History     Tobacco Use   Smoking Status Never Smoker   Smokeless Tobacco Never Used     Family History   Problem Relation Age of Onset     Alcohol abuse Father      Heart disease Father      Past Surgical History:   Procedure Laterality Date     FOOT ARTHRODESIS, MODIFIED STERN Bilateral      VITALS:  Vitals:    05/30/19 0821   BP: 124/72   Patient Site: Left Arm   Patient Position: Sitting   Cuff Size: Adult Regular   Pulse: 66   SpO2: 99%   Weight: 161 lb (73 kg)   Height: 5' 11\" (1.803 m)     Wt Readings from Last 3 Encounters:   05/30/19 161 lb (73 kg)   05/02/19 155 lb (70.3 kg)   04/04/19 157 lb 11.2 oz (71.5 kg)     PHYSICAL EXAM:  Constitutional:  In NAD, alert and oriented  Extremities: no joint effusion or inflammation, no significant edema  Neurologic:  Speech clear, motor is intact, gait normal, no evident cramping, or large muscle group abnormalities.      Psychiatric:  Mood appropriate, memory intact, thinking is clear.     Current Outpatient Medications   Medication Sig Dispense Refill     spironolactone (ALDACTONE) 25 MG tablet Take 1 tablet (25 mg total) by mouth 2 (two) times a day. 60 tablet 11     No current facility-administered medications for this visit.      Samuel Almonte MD  Internal Medicine  [facility]  "

## 2021-05-30 VITALS — BODY MASS INDEX: 22.32 KG/M2 | WEIGHT: 160 LBS

## 2021-05-30 VITALS — WEIGHT: 156 LBS | BODY MASS INDEX: 21.84 KG/M2 | HEIGHT: 71 IN

## 2021-05-30 VITALS — BODY MASS INDEX: 22.48 KG/M2 | HEIGHT: 71 IN | WEIGHT: 160.6 LBS

## 2021-05-30 NOTE — PROGRESS NOTES
ASSESSMENT AND PLAN:    1. Central serous retinopathy, bilateral  Can increase the spironolactone to 75 mg po two times a day.  Follow up one month to check electrolytes.   - spironolactone (ALDACTONE) 25 MG tablet; Take 3 tablets (75 mg total) by mouth 2 (two) times a day at 9am and 6pm.  Dispense: 120 tablet; Refill: 11    2. Psoriatic arthritis   Using meloxicam and has good control of symptoms and hands are improved.   - meloxicam (MOBIC) 7.5 MG tablet; Take 1 tablet p.o. twice daily or 2 tabs p.o. daily, prn. Take with food.  Dispense: 60 tablet; Refill: 2    Patient Instructions   1. Increase spironolactone to 3 pills twice daily    2. Continue the meloxicam 7.5 mg - two pills in the evening daily    3. Follow up one month.     CHIEF COMPLAINT:  Chief Complaint   Patient presents with     Follow-up     meds     HISTORY OF PRESENT ILLNESS:  Shay Mendosa is a 58 y.o. male is here for follow up.  He has noted some vision problem with one eye, he thinks.  Not having scotomata, but non-specific less clear vision.  He has discussed this with his eye physician.  He would like to increase the spironolactone.  His hand arthritis and his psoriasis are both better lately, using the NSAID therapy.  Continues to work.     REVIEW OF SYSTEMS:   See HPI, all other systems on review are negative.    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     Other irritable bowel syndrome 4/4/2019     Psoriasis      Psoriatic arthritis (H)      Vitamin D deficiency      Social History     Tobacco Use   Smoking Status Never Smoker   Smokeless Tobacco Never Used     Family History   Problem Relation Age of Onset     Alcohol abuse Father      Heart disease Father      Past Surgical History:   Procedure Laterality Date     FOOT ARTHRODESIS,  "MODIFIED STERN Bilateral      VITALS:  Vitals:    07/25/19 0939   BP: 112/72   Patient Site: Left Arm   Patient Position: Sitting   Cuff Size: Adult Regular   Pulse: 72   SpO2: 98%   Weight: 160 lb (72.6 kg)   Height: 5' 11\" (1.803 m)     Wt Readings from Last 3 Encounters:   07/25/19 160 lb (72.6 kg)   07/11/19 160 lb 5 oz (72.7 kg)   07/11/19 160 lb 4.8 oz (72.7 kg)     PHYSICAL EXAM:  Constitutional:  In NAD, alert and oriented  Extremities: psoriasis on elbows, mild, not florid, hands reveal minimal synovitis.      DECISION TO OBTAIN OLD RECORDS AND/OR OBTAIN HISTORY FROM SOMEONE OTHER THAN PATIENT, AND/OR ACCESSING CARE EVERYWHERE):  1  I reviewed his eye physician scanned note.      REVIEW AND SUMMARIZATION OF OLD RECORDS, AND/OR OBTAINING HISTORY FROM SOMEONE OTHER THAN PATIENT, AND/OR DISCUSSION OF CASE WITH ANOTHER HEALTH CARE PROVIDER:  2 0    REVIEW AND/OR ORDER OF OF CLINICAL LAB TESTS: 1  Reviewed his recent lab testing.     REVIEW AND/OR ORDER OF RADIOLOGY TESTS: 1 0    REVIEW AND/OR ORDER OF MEDICAL TESTS (EKG/ECHO/COLONOSCOPY/EGD): 1  0    INDEPENDENT  VISUALIZATION OF IMAGE, TRACING, OR SPECIMEN ITSELF (2 EACH): 0     TOTAL: 2    Current Outpatient Medications   Medication Sig Dispense Refill     meloxicam (MOBIC) 7.5 MG tablet Take 1 tablet p.o. twice daily or 2 tabs p.o. daily, prn. Take with food. 60 tablet 2     spironolactone (ALDACTONE) 25 MG tablet Take 3 tablets (75 mg total) by mouth 2 (two) times a day at 9am and 6pm. 120 tablet 11     No current facility-administered medications for this visit.      Samuel Almonte MD  Internal Medicine  [facility]  "

## 2021-05-30 NOTE — PATIENT INSTRUCTIONS - HE
Summary of Your Rheumatology Visit    Next Appointment:  3 Months    Medications:    Please follow directives on pill bottle on how to take medication(s) provided.    Hold ibuprofen while on meloxicam.    Referrals:    Dermatology    Tests:     Please have labs performed a few days prior to next visit.       Injections:        Other:

## 2021-05-30 NOTE — PATIENT INSTRUCTIONS - HE
1. Increase spironolactone to 3 pills twice daily    2. Continue the meloxicam 7.5 mg - two pills in the evening daily    3. Follow up one month.

## 2021-05-30 NOTE — PROGRESS NOTES
Shay Mendosa who presents today with a chief complaint of        Joint Pains: Yes  Location: fingers,  Onset: chronic(couple years)  Intensity:  0/10  AM Stiffness: 5 Minutes  Alleviating/Aggravating Factors: Not on Medications for it  Tolerating Meds: Yes  Other:      ROS:  Denies having any: dry eyes/dry mouth,  patchy alopecia, photosensitivity, raynaud's, +personal history or family history of psoriasis, no: history of inflammatory bowel disease, history of inflammatory eye disease (uveitis, iritis, etc), history of peptic ulcer disease, history of seizures, anxiety, depression, chronic unrefreshing/nonrestorative sleep.    Denies regular alcohol beverage intake and smoking history.  Denies having any active: chest pain, shortness of breath, cough, abdominal pain, nausea, vomiting, rashes, fevers, oral ulcers and recent infections.  Patient admits to having a good appetite.    Information gathered by medical assistant incorporated into this note, was reviewed and discussed with the patient.    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     Other irritable bowel syndrome 4/4/2019     Psoriasis      Psoriatic arthritis (H)      Vitamin D deficiency        Surgical History:  Past Surgical History:   Procedure Laterality Date     FOOT ARTHRODESIS, MODIFIED STERN Bilateral        Family History:  Family History   Problem Relation Age of Onset     Alcohol abuse Father      Heart disease Father        Social  History:   reports that he has never smoked. He has never used smokeless tobacco. He reports that he does not drink alcohol.    Allergies:  Allergies   Allergen Reactions     Sulfa (Sulfonamide Antibiotics)         Current Medications:  Current Outpatient Medications   Medication Sig Dispense Refill     spironolactone (ALDACTONE) 25 MG tablet Take 1 tablet (25 mg total) by mouth 2 (two) times a day. 60 tablet 11     No current facility-administered medications for this visit.            Physical Exam:  /80 (Patient Site: Left Arm, Patient Position: Sitting, Cuff Size: Adult Regular)   Pulse 70   Wt 160 lb 4.8 oz (72.7 kg)   BMI 22.36 kg/m    General: A & O x 3 in NAD  HEENT: EOMI, Non injected/non icteric sclera, no oral lesions noted  Dermatology: Psoriatic plaques noted involving the extensor surfaces of elbows and extensor surfaces of knees bilaterally also noted distal lower extremities.  CV: s1s2 with RRR, no rubs appreciated   Lungs: CTA B/L, no wheezing , rales or rhonci appreciated  GI: Soft, NT/ND, no rebound, no guarding noted  MS: Mild hypertrophic changes noted involving hand joints, nontender to palpation.  Otherwise patient demonstrated good passive/active ROM over other joints with no warmth, erythema, tenderness or synovitis noted over these joints.    Summary/Assessment:    Was a patient of Dr. Perdomo, last seen April 2017.    History that includes psoriasis and psoriatic arthritis.    Believes he was diagnosed with psoriasis about 25 years ago and psoriatic arthritis about 8 years ago.    States since being seen by Dr. Perdomo has not seen another rheumatologist.    Was on methotrexate in the past, discontinued soon after appointment with Dr. Perdomo in 2017 as was concerned contributing to right eye pain eventually told to have central serous retinopathy bilaterally.  Claims to be followed by ophthalmology.    Patient has been off of any DMARD medications since discontinuing methotrexate  "and does not recall trying any other DMARDs outside of methotrexate in the past.    Has some stiffness involving second digits of hands however denies having any active pains and no reproducible pains on exam today.  No clear signs of synovitis appreciated as well.    Does have active skin psoriasis involving extensor surfaces of elbows and knees bilaterally along with distal lower extremities.  Uses OTC cortisone and Epson salt which provides some relief.  Is currently not established with a dermatologist.      For arthralgias occasionally takes 1 tablet of Advil 200 mg daily which provides some relief.  Desires to try more potent anti-inflammatory medication.    Occasionally has some cramping sensations at night, tolerable and improves with exercise.  Claims to be drinking a sufficient amount of fluids daily.  Denies regular EtOH intake.    Please see below for management plan.      Pertinent rheumatology/past medical history (please refer to above for more detailed history):      Psoriatic arthritis    Psoriasis    Chronic hand pains    Muscle cramping at night (distal lower extremities), mild and tolerable    Central serous retinopathy bilaterally    Hx bunion related surgeries, bilateral first toes      Rheumatology medications provided/suggested:    Meloxicam      Pertinent medication from other providers or from otc (please refer to above for more detailed med list):    Spironolactone (for \"'excess fluid in eyes\")        Pertinent medications already tried:     Methotrexate (eye pain)  Advil  Sulfa allergy      Pertinent lab history:    From 2016, negative/unremarkable: Rheumatoid factor, CCP antibody, XAVIER, hepatitis panel, HIV      Pertinent imaging/test history:          Other:    Denies regular alcohol beverage intake or tobacco use.      Plan:      Hold Advil, will try meloxicam 7.5 mg twice daily, PRN instead.    Deferred adding a muscle relaxer for occasional muscle cramping of lower extremities.    We " will x-ray hands.      Will refer to dermatology.    Check labs prior to next visit.    Follow-up in 3 months.      Procedure note:            Spent 40 minutes with greater than half of this time spent with the patient going over differential diagnosis, prognosis, treatment plan, medication side effects and  answering questions.    The patient is new to me however, is a follow-up to our rheumatology clinic.      This note was transcribed using Dragon voice recognition software as a result unintentional grammatical errors or word substitutions may have occurred. Please contact our Rheumatology department if you need any clarification or if you have any related inquiries.    Major side effect profile of medications provided were discussed with the patient.      Greg Cosme DO ....................  7/11/2019   11:12 AM

## 2021-05-30 NOTE — TELEPHONE ENCOUNTER
Please let him know that I do not think he should increase his medications to more than 3 pills twice daily.  He should discuss with his eye physician if his vision is not doing well.  Dr. Gage MD

## 2021-05-30 NOTE — PROGRESS NOTES
" ASSESSMENT AND PLAN:    1. Central serous retinopathy, bilateral  He has agreed to take the spironolactone 50 mg po two times a day per his eye doctor recommendation.  He feels it makes his vision better.  I urged him to continue.  Check electrolytes.   - spironolactone (ALDACTONE) 25 MG tablet; Take 2 tablets (50 mg total) by mouth 2 (two) times a day at 9am and 6pm.  Dispense: 120 tablet; Refill: 11  - Basic Metabolic Panel    2. Psoriatic arthropathy   Has seen rheumatology and has new rx, which he is happy with.     Patient Instructions   1 continue spironolactone 50 mg twice daily    2. Follow up in three months.     CHIEF COMPLAINT:  Chief Complaint   Patient presents with     Follow-up     meds     HISTORY OF PRESENT ILLNESS:  Shay Mendosa is a 58 y.o. male is here in follow up. Has seen ophthalmology and rheumatology.  He is happy with his treatment.  Continues to work and 'I am doing better'.  His hands are less inflamed.     REVIEW OF SYSTEMS:   See HPI, all other systems on review are negative.    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     Other irritable bowel syndrome 4/4/2019     Psoriasis      Psoriatic arthritis (H)      Vitamin D deficiency      Social History     Tobacco Use   Smoking Status Never Smoker   Smokeless Tobacco Never Used     Family History   Problem Relation Age of Onset     Alcohol abuse Father      Heart disease Father      Past Surgical History:   Procedure Laterality Date     FOOT ARTHRODESIS, MODIFIED STERN Bilateral      VITALS:  Vitals:    07/11/19 1447   BP: 106/62   Patient Site: Left Arm   Patient Position: Sitting   Cuff Size: Adult Regular   Pulse: 84   SpO2: 98%   Weight: 160 lb 5 oz (72.7 kg)   Height: 5' 11\" (1.803 m)     Wt Readings from Last 3 Encounters:   07/11/19 " 160 lb 5 oz (72.7 kg)   07/11/19 160 lb 4.8 oz (72.7 kg)   05/30/19 161 lb (73 kg)     PHYSICAL EXAM:  Constitutional:  In NAD, alert and oriented  Cardiac:  S1 S2 without murmur  Lungs: Clear to auscultation  Extremities: minimal MCP joint inflammation today    DECISION TO OBTAIN OLD RECORDS AND/OR OBTAIN HISTORY FROM SOMEONE OTHER THAN PATIENT, AND/OR ACCESSING CARE EVERYWHERE):  1  Reviewed eye physician note    REVIEW AND SUMMARIZATION OF OLD RECORDS, AND/OR OBTAINING HISTORY FROM SOMEONE OTHER THAN PATIENT, AND/OR DISCUSSION OF CASE WITH ANOTHER HEALTH CARE PROVIDER:  2 reviewed rheumatology evaluation    REVIEW AND/OR ORDER OF OF CLINICAL LAB TESTS: 1  odered.    REVIEW AND/OR ORDER OF RADIOLOGY TESTS: 1 0.    REVIEW AND/OR ORDER OF MEDICAL TESTS (EKG/ECHO/COLONOSCOPY/EGD): 1  0    INDEPENDENT  VISUALIZATION OF IMAGE, TRACING, OR SPECIMEN ITSELF (2 EACH):  0     TOTAL: 4    Current Outpatient Medications   Medication Sig Dispense Refill     meloxicam (MOBIC) 7.5 MG tablet Take 1 tablet p.o. twice daily or 2 tabs p.o. daily, prn. Take with food. 60 tablet 2     spironolactone (ALDACTONE) 25 MG tablet Take 2 tablets (50 mg total) by mouth 2 (two) times a day at 9am and 6pm. 120 tablet 11     No current facility-administered medications for this visit.      Samuel Almonte MD  Internal Medicine  [facility]

## 2021-05-30 NOTE — TELEPHONE ENCOUNTER
Shay was in the clinic today with his son Abraham and stopped by the desk to give Dr. Almonte a message. He saw his eye surgeon about 3 weeks ago in [place] (he cannot recall the name - Dr. Vasquez?) and says that since his visit his eyes are deteriorating. He is finding driving difficult and could not read to help his son with AWV form today in the clinic. He is taking spironolactone three pills twice daily as recommended and wants to know should he increase to four pills twice daily. He did not mention this in his visit with the eye surgeon as it is recent.

## 2021-05-30 NOTE — TELEPHONE ENCOUNTER
----- Message from Greg Cosme DO sent at 7/15/2019  2:06 PM CDT -----  FINDINGS: Negative hands. No fracture or dislocation. No signs of arthritis.

## 2021-05-31 VITALS — BODY MASS INDEX: 22.89 KG/M2 | WEIGHT: 163.5 LBS | HEIGHT: 71 IN

## 2021-05-31 VITALS — WEIGHT: 159.6 LBS | BODY MASS INDEX: 22.26 KG/M2

## 2021-05-31 NOTE — PATIENT INSTRUCTIONS - HE
1.  Continue the spironolactone 50 mg twice daily    2. Continue the meloxicam.    3. Blood testing today.     4. Reassured about muscle cramping    5. Follow up in three months.

## 2021-05-31 NOTE — TELEPHONE ENCOUNTER
"Patient calling - says both of his shoulders and upper chest near his neck are \"stiff.\"    No pain.  Shoulders are just sore and stiff.  Symptoms started 3 days ago.   He says it could be from straining to lift things at home or work.  He also lifts dumbbells at home sometimes.  He says it is \"like arthritis.\" He says this has happened before.  In the past it helped to take a hot bath and soak in epson salts.  He says his arthritis doctor gave him prescription Advil which helps but he forgot to take it today.    No difficulty breathing.  No fever.  No chest pain.  No headache.  Able to move neck around and able to touch chin to chest.  No rash or redness.  No swelling, numbness or weakness of arms or hands.    Home Care Advice given to patient per protocl: apply warm washcloth or heating pad for 10 minutes three time daily to help increase circulation and improve healing, relax in a warm shower 20 minutes twice daily, continue Advil for pain, avoid any exercise or activity which causes the pain.    Advised patient to call back if symptoms last over one week, new symptoms occur or he becomes worse.    Tracie Merchant RN  Triage Nurse Advisor    Reason for Disposition    Caused by overuse from recent vigorous activity (e.g., tennis, other sports, work involving overhead lifting)    Protocols used: SHOULDER PAIN-A-AH      "

## 2021-05-31 NOTE — TELEPHONE ENCOUNTER
Patient Returning Call  Reason for call:  Returning clinic call  Information relayed to patient:  Patient was read the below message from  RICA Buckner from 8/2/19.  Patient is understanding of  Message and said will be contacting his eye Dr on Monday 8/12/19  Patient has additional questions:  No  If YES, what are your questions/concerns:  NA  Okay to leave a detailed message?: No call back needed

## 2021-05-31 NOTE — PROGRESS NOTES
ASSESSMENT AND PLAN:    1. Psoriatic arthropathy   Seems to be doing well with melixicam.      2. Central serous retinopathy, bilateral  He feels his vision is better.  Taking 50 mg po two times a day of the spironolactone, and will stay on that dose.   - Basic Metabolic Panel    3. Muscle cramping  Non-specific symptoms, in legs.  Will test electrolytes. Suspect related to inconsistent activity.  Does seem worse after 'walking a lot'.  - Magnesium    Patient Instructions   1.  Continue the spironolactone 50 mg twice daily    2. Continue the meloxicam.    3. Blood testing today.     4. Reassured about muscle cramping    5. Follow up in three months.     CHIEF COMPLAINT:  Chief Complaint   Patient presents with     Follow-up     HISTORY OF PRESENT ILLNESS:  Shay Mendosa is a 58 y.o. male here in follow up. He notes leg cramps episodically.  Occasionally intense. No persistent numbness or weakness.  Taking two times a day spironolactone and feels his vision is 'ok'.  No unusual cough or dyspnea.     REVIEW OF SYSTEMS:   See HPI, all other systems on review are negative.    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     Other irritable bowel syndrome 4/4/2019     Psoriasis      Psoriatic arthritis (H)      Vitamin D deficiency      Social History     Tobacco Use   Smoking Status Never Smoker   Smokeless Tobacco Never Used     Family History   Problem Relation Age of Onset     Alcohol abuse Father      Heart disease Father      Past Surgical History:   Procedure Laterality Date     FOOT ARTHRODESIS, MODIFIED STERN Bilateral      VITALS:  Vitals:    08/22/19 0830   BP: 104/66   Patient Site: Left Arm   Patient Position: Sitting   Cuff Size: Adult Regular   Pulse: 68   SpO2: 98%   Weight: 159 lb 2 oz (72.2 kg)   Height: 5'  "11\" (1.803 m)     Wt Readings from Last 3 Encounters:   08/22/19 159 lb 2 oz (72.2 kg)   07/25/19 160 lb (72.6 kg)   07/11/19 160 lb 5 oz (72.7 kg)     PHYSICAL EXAM:  Constitutional:  In NAD, alert and oriented  Cardiac:  S1 S2 without murmur  Lungs: Clear   Abdomen:   Soft, flat and non-tender    Extremities: no joint effusion or inflammation, no significant edema    Current Outpatient Medications   Medication Sig Dispense Refill     meloxicam (MOBIC) 7.5 MG tablet Take 1 tablet p.o. twice daily or 2 tabs p.o. daily, prn. Take with food. 60 tablet 2     spironolactone (ALDACTONE) 25 MG tablet Take 3 tablets (75 mg total) by mouth 2 (two) times a day at 9am and 6pm. 120 tablet 11     No current facility-administered medications for this visit.      Samuel Almonte MD  Internal Medicine  [facility]  "

## 2021-06-01 VITALS — WEIGHT: 160.19 LBS | BODY MASS INDEX: 22.43 KG/M2 | HEIGHT: 71 IN

## 2021-06-01 VITALS — BODY MASS INDEX: 22.8 KG/M2 | WEIGHT: 163.5 LBS

## 2021-06-02 VITALS — WEIGHT: 162.8 LBS | BODY MASS INDEX: 22.71 KG/M2

## 2021-06-02 VITALS — WEIGHT: 162.31 LBS | BODY MASS INDEX: 21.98 KG/M2 | HEIGHT: 72 IN

## 2021-06-02 VITALS — WEIGHT: 157.7 LBS | BODY MASS INDEX: 21.36 KG/M2 | HEIGHT: 72 IN

## 2021-06-02 VITALS — WEIGHT: 158.4 LBS | BODY MASS INDEX: 21.48 KG/M2

## 2021-06-02 NOTE — TELEPHONE ENCOUNTER
Critical lab value called from: Emma Hoyt at [facility]  Critical lab result: Glucose 54  Lab drawn time: 10am  Ordered by Provider: Dr. Almonte  Related Dx type(s): No hx of Diabetes  Related Rx(s): None    Pt. Was seen in office with Dr. Almonte today. No related symptoms during visit.   Most recent glucose was on 8/22 result: 73.    Pt. Notified: 5:50 PM Currently does not have any hypoglycemia symptoms.   OV was at 9:30 am. Pt. Stated he had eaten breakfast/medium size veggie pizza and lemonade before OV. Discussed hypoglycemia symptoms with patient. Pt. verbalizes understanding.     6:00 PM Paged On-Call Provider: Dr. Back.  6:27 PM No call back. Contacted switchboard to page On-Call Provider.  6:54 PM No call back. Contacted switchboard - called cell phone.     Spoke with Dr. Back, no concerns. Will route note to PCP.     Anita Mendenhall RN Triage Nurse Advisor 7:02 PM

## 2021-06-02 NOTE — PROGRESS NOTES
ASSESSMENT AND PLAN:    1. Hyperlipidemia LDL goal <130  Given lack of other risk factors, I do not recommend primary prevention.      2. Central serous retinopathy, bilateral  On the spironolactone.  He feels his vision is improved.  No apparent side effects.    - Basic Metabolic Panel    3. Psoriatic arthropathy  Ongoing care with rheumatology.     4. Intellectual functioning disability  Stable and employed.      5. History of polyuria  Will test UA, clinically pathology is not suggested.   - Urinalysis-UC if Indicated    Patient Instructions   1. No changes in current medications.     2. We discussed that taking statin medication for elevated LDL cholesterol has only weak justification.      3. We discussed polyuria.  Will get urinalysis and blood test for electrolytes today.     4. Follow up in 6 months.     CHIEF COMPLAINT:  Chief Complaint   Patient presents with     Follow-up     routine follow up     HISTORY OF PRESENT ILLNESS:  Shay Mendosa is a 58 y.o. male here in follow up.  He feels he is doing OK, taking the spironolactone and feels his vision is better, except when he feels sick.  Not feeling sick lately.  Some polyuria during the day without dysuria, not really new.      REVIEW OF SYSTEMS:   See HPI, all other systems on review are negative.    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     Other irritable bowel syndrome 4/4/2019     Psoriasis      Psoriatic arthritis (H)      Vitamin D deficiency      Social History     Tobacco Use   Smoking Status Never Smoker   Smokeless Tobacco Never Used     Family History   Problem Relation Age of Onset     Alcohol abuse Father      Heart disease Father      Past Surgical History:   Procedure Laterality Date     FOOT ARTHRODESIS, MODIFIED STERN Bilateral   "    VITALS:  Vitals:    10/31/19 0928   BP: 100/62   Patient Site: Left Arm   Patient Position: Sitting   Cuff Size: Adult Regular   Pulse: 72   Weight: 160 lb (72.6 kg)   Height: 5' 11\" (1.803 m)     Wt Readings from Last 3 Encounters:   10/31/19 160 lb (72.6 kg)   10/31/19 160 lb 11.2 oz (72.9 kg)   08/22/19 159 lb 2 oz (72.2 kg)     PHYSICAL EXAM:  Constitutional:  In NAD, alert and oriented  Cardiac:  S1 S2 without murmur  Lungs: Clear to auscultation  Psychiatric:  Mood and behavior appropriate, thinking is clear.     Current Outpatient Medications   Medication Sig Dispense Refill     meloxicam (MOBIC) 7.5 MG tablet Take 1 tablet p.o. twice daily or 2 tabs p.o. daily, PRN. Take with food. 60 tablet 0     spironolactone (ALDACTONE) 25 MG tablet Take 3 tablets (75 mg total) by mouth 2 (two) times a day at 9am and 6pm. 120 tablet 11     Samuel Almonte MD  Internal Medicine  [facility]  "

## 2021-06-02 NOTE — PATIENT INSTRUCTIONS - HE
1. No changes in current medications.     2. We discussed that taking statin medication for elevated LDL cholesterol has only weak justification.      3. We discussed polyuria.  Will get urinalysis and blood test for electrolytes today.     4. Follow up in 6 months.

## 2021-06-02 NOTE — TELEPHONE ENCOUNTER
Please tell Mr. Mendosa that he has to be fasting to check his cholesterol.  He should come back some morning after he has had only water since the night before.  Dr. Gage MD

## 2021-06-02 NOTE — PATIENT INSTRUCTIONS - HE
Summary of Your Rheumatology Visit    Next Appointment:  6 Months    Medications:    Continue medications provided, as discussed.      Referrals:      Tests:     Please have labs performed a few days prior to next visit.       Injections:        Other:

## 2021-06-02 NOTE — TELEPHONE ENCOUNTER
Patient here this morning wanting to know if he could have a Lipid done can you please enter and order for this he is not fasting but wants to know what his chol. Is not fasting.

## 2021-06-02 NOTE — PROGRESS NOTES
Shay Mendosa who presents today with a chief complaint of  Follow-up      Joint Pains: no  Location: hands  Onset: chronic many years   Intensity: 0 /10  AM Stiffness: yes, few Minutes  Alleviating/Aggravating Factors:  meds helpful  Tolerating Meds: Yes  Other:      ROS:  Patient denies having any active: chest pain, shortness of breath, cough, abdominal pain, nausea, vomiting, rashes, documented fevers, oral ulcers and recent infections. +Patient admits to having a good appetite.  Information gathered by medical assistant incorporated into this note, was reviewed and discussed with the patient.    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     Other irritable bowel syndrome 4/4/2019     Psoriasis      Psoriatic arthritis (H)      Vitamin D deficiency        Surgical History:  Past Surgical History:   Procedure Laterality Date     FOOT ARTHRODESIS, MODIFIED STERN Bilateral        Family History:  Family History   Problem Relation Age of Onset     Alcohol abuse Father      Heart disease Father        Social History:   reports that he has never smoked. He has never used smokeless tobacco. He reports that he does not drink alcohol.    Allergies:  Allergies   Allergen Reactions     Sulfa (Sulfonamide Antibiotics)         Current Medications:  Current Outpatient Medications   Medication Sig Dispense Refill     meloxicam (MOBIC) 7.5 MG tablet Take 1 tablet p.o. twice daily or 2 tabs p.o. daily, PRN.  "Take with food. 60 tablet 0     spironolactone (ALDACTONE) 25 MG tablet Take 3 tablets (75 mg total) by mouth 2 (two) times a day at 9am and 6pm. 120 tablet 11     No current facility-administered medications for this visit.            Physical Exam:  /70   Pulse 68   Ht 5' 11\" (1.803 m)   Wt 160 lb 11.2 oz (72.9 kg)   BMI 22.41 kg/m    General: A & O x 3 in NAD  HEENT: EOMI, Non injected/non icteric sclera, no oral lesions noted  CV: s1s2 with RRR, no rubs appreciated   Lungs: CTA B/L, no wheezing , rales or rhonci appreciated  GI: Soft, NT/ND, no rebound, no guarding noted  MS: Mild hypertrophic changes noted involving hand joints, nontender.  Some synovial thickening noted involving bilateral second PIP joints, nontender.  Otherwise patient demonstrated good passive/active ROM over other joints with no warmth, erythema, tenderness or synovitis noted over these joints.      Summary/Assessment:        History that includes psoriasis and psoriatic arthritis.    Presents for a follow-up visit.    Claims to be doing well with meloxicam 7.5 mg daily.    Latest labs noted to be stable.    Please see below for management plan.      From prior note: Believes he was diagnosed with psoriasis about 25 years ago and psoriatic arthritis about 8 years ago.    Was on methotrexate in the past, discontinued soon after appointment with Dr. Perdomo in 2017 as was concerned contributing to right eye pain eventually told to have central serous retinopathy bilaterally.  Claims to be followed by ophthalmology.      Pertinent rheumatology/past medical history (please refer to above for more detailed history):      Psoriatic arthritis    Psoriasis (referred to dermatology)    Chronic hand pains    Muscle cramping at night (distal lower extremities), mild and tolerable    Central serous retinopathy bilaterally    Hx bunion related surgeries, bilateral first toes      Rheumatology medications " "provided/suggested:    Meloxicam      Pertinent medication from other providers or from otc (please refer to above for more detailed med list):    Spironolactone (for \"'excess fluid in eyes\")        Pertinent medications already tried:     Methotrexate (eye pain)  Advil  Sulfa allergy      Pertinent lab history:    From 2016, negative/unremarkable: Rheumatoid factor, CCP antibody, XAVIER, hepatitis panel, HIV      Pertinent imaging/test history:    X-rays of hands were unremarkable.        Other:    Denies regular alcohol beverage intake or tobacco use.     Was a patient of Dr. Perdomo, last seen April 2017.    Plan:      Continue meloxicam 7.5 mg twice daily, PRN instead.    Deferred adding a muscle relaxer for occasional muscle cramping of lower extremities.    Check labs prior to next visit.    Follow-up in 6 months.      Procedure note:       Spent 40 minutes with greater than half of this time spent with the patient going over differential diagnosis, prognosis, treatment plan, medication side effects and  answering questions.      This note was transcribed using Dragon voice recognition software as a result unintentional grammatical errors or word substitutions may have occurred. Please contact our Rheumatology department if you need any clarification or if you have any related inquiries.    Major side effect profile of medications provided were discussed with the patient.      Greg Cosme DO....................  10/31/2019   8:35 AM      "

## 2021-06-03 VITALS
DIASTOLIC BLOOD PRESSURE: 70 MMHG | BODY MASS INDEX: 22.5 KG/M2 | HEIGHT: 71 IN | SYSTOLIC BLOOD PRESSURE: 126 MMHG | HEART RATE: 68 BPM | WEIGHT: 160.7 LBS

## 2021-06-03 VITALS
WEIGHT: 160 LBS | HEIGHT: 71 IN | SYSTOLIC BLOOD PRESSURE: 100 MMHG | BODY MASS INDEX: 22.4 KG/M2 | HEART RATE: 72 BPM | DIASTOLIC BLOOD PRESSURE: 62 MMHG

## 2021-06-03 VITALS — WEIGHT: 160.31 LBS | HEIGHT: 71 IN | BODY MASS INDEX: 22.44 KG/M2

## 2021-06-03 VITALS — BODY MASS INDEX: 22.4 KG/M2 | WEIGHT: 160 LBS | HEIGHT: 71 IN

## 2021-06-03 VITALS — WEIGHT: 160.3 LBS | BODY MASS INDEX: 22.36 KG/M2

## 2021-06-03 VITALS — WEIGHT: 159.13 LBS | HEIGHT: 71 IN | BODY MASS INDEX: 22.28 KG/M2

## 2021-06-03 VITALS — WEIGHT: 161 LBS | BODY MASS INDEX: 22.54 KG/M2 | HEIGHT: 71 IN

## 2021-06-03 VITALS — BODY MASS INDEX: 21.7 KG/M2 | WEIGHT: 155 LBS | HEIGHT: 71 IN

## 2021-06-03 NOTE — TELEPHONE ENCOUNTER
Received rx refill request from [facility] pharmacy for meloxicam.     Last OV 10/31/2019  Last lab 10/7/19  Future appt 4/30/2020    rx sent for signature.

## 2021-06-03 NOTE — PROGRESS NOTES
[facility] Clinic Note    Shay Mendosa   59 y.o. male    Date of Visit: 12/3/2019  Chief Complaint   Patient presents with     Toe Pain     all toes on both feet       ASSESSMENT/PLAN  1. Peripheral polyneuropathy  Vitamin B12    Glycosylated Hemoglobin A1c    Lyme Antibody Cascade    Thyroid Stimulating Hormone (TSH)    T4, Free    Electrophoresis, Protein, Serum    EMG- Both Legs   2. Muscle cramp  Magnesium     ---------------------------------------------    1.  High suspicion for peripheral neuropathy, which at this point appears to be idiopathic.  He has not been through the complete work-up yet, which I will order above.  I explained that this is usually combination of lab work and EMG.  I would forego the MRI of the lumbar spine since he does not have any back pain or radicular symptoms.    2.  He mentions having some muscle cramps, we also check magnesium.  He is on spironolactone as his only medication in addition to meloxicam.  I believe the spironolactone was started by his ophthalmologist.    Return in about 1 month (around 1/3/2020) for Recheck.      SUBJECTIVE    Shay Mendosa is a 59-year-old patient of Dr. Samuel Almonte with history of psoriatic arthropathy, who presents for odd feeling in his feet, described first as tenderness.    He works as a , now he now has different days off, which is why he is here today.    All there toes on his feet are tender. They feel funny, and he has some difficulty describing exactly how they feel funny, but he does affirm that it feels like he is walking on balled up socks.  He has tenderness under the balls of the feet.  He characterizes the tenderness as- maybe tingling maybe not.      This has bene going on a while, maybe >1 month.      Most of the time he notices it during work.  He sometimes notices it when he goes to bed as well.    He wears Nikes while working.   He could not answer whether or not he has changes footwear  "recently.    There is no history of diabetes.        ROS:   Per HPI, all other systems negative     Medications, allergies, and problem list were reviewed and updated    Patient Active Problem List   Diagnosis     Anxiety     Hyperlipidemia LDL goal <130     Psoriasis     Psoriatic arthropathy (H)     Central serous retinopathy, bilateral     Intellectual functioning disability     Bone callus     Decreased vision of right eye     Other irritable bowel syndrome     Muscle cramping     Past Medical History:   Diagnosis Date     Anxiety     chronic stress/intellectually disabled son     Autism spectrum disorder      Central serous retinopathy      Intellectual disability     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     Other irritable bowel syndrome 4/4/2019     Psoriasis      Psoriatic arthritis (H)      Vitamin D deficiency      Current Outpatient Medications   Medication Sig Dispense Refill     meloxicam (MOBIC) 7.5 MG tablet Take 1 tablet p.o. twice daily or 2 tabs p.o. daily, PRN. Take with food. 60 tablet 0     spironolactone (ALDACTONE) 25 MG tablet Take 3 tablets (75 mg total) by mouth 2 (two) times a day at 9am and 6pm. 120 tablet 11     No current facility-administered medications for this visit.      Allergies   Allergen Reactions     Sulfa (Sulfonamide Antibiotics)        EXAM  Vitals:    12/03/19 1358   BP: 94/68   Patient Site: Left Arm   Patient Position: Sitting   Cuff Size: Adult Regular   Pulse: 70   SpO2: 98%   Weight: 162 lb (73.5 kg)   Height: 5' 11\" (1.803 m)         General: Alert, no distress  Neurologic: Absent sensation with monofilament on 2 of 6 areas on the right foot and full sensation in the left foot.  Absent Achilles reflex bilaterally.  Proprioception of great toe intact bilaterally.  Skin: No rashes  Musculoskeletal: No obvious palpable abnormalities, though there are postoperative changes from left great toe surgery, likely bunion surgery    Labs ordered today, EMG " ordered    Brijesh Ontiveros DO  Internal Medicine  [facility]

## 2021-06-04 VITALS
WEIGHT: 162 LBS | DIASTOLIC BLOOD PRESSURE: 68 MMHG | HEART RATE: 70 BPM | HEIGHT: 71 IN | SYSTOLIC BLOOD PRESSURE: 94 MMHG | BODY MASS INDEX: 22.68 KG/M2 | OXYGEN SATURATION: 98 %

## 2021-06-04 VITALS
SYSTOLIC BLOOD PRESSURE: 104 MMHG | OXYGEN SATURATION: 98 % | HEIGHT: 71 IN | HEART RATE: 72 BPM | BODY MASS INDEX: 22.74 KG/M2 | WEIGHT: 162.44 LBS | DIASTOLIC BLOOD PRESSURE: 62 MMHG

## 2021-06-04 NOTE — TELEPHONE ENCOUNTER
Labs from December 3 all look normal.  No abnormalities to explain neuropathy    Dr. Khan will be sending a letter when he returns with results and details of plan if needed

## 2021-06-04 NOTE — TELEPHONE ENCOUNTER
Test Results  Who is calling?:  Patient    Who ordered the test:  PCP    Type of test: Lab     Date of test:  12/03/2019    Where was the test performed:  [facility]    What are your questions/concerns?:  Please call with lab results.    Okay to leave a detailed message?:  Yes

## 2021-06-04 NOTE — TELEPHONE ENCOUNTER
Left pt detailed message relaying Dr. Baig's message.  Advised pt to call back with further questions.

## 2021-06-05 VITALS
OXYGEN SATURATION: 98 % | HEIGHT: 71 IN | DIASTOLIC BLOOD PRESSURE: 74 MMHG | HEART RATE: 66 BPM | WEIGHT: 166 LBS | SYSTOLIC BLOOD PRESSURE: 106 MMHG | BODY MASS INDEX: 23.24 KG/M2

## 2021-06-05 VITALS
BODY MASS INDEX: 21.9 KG/M2 | OXYGEN SATURATION: 99 % | SYSTOLIC BLOOD PRESSURE: 116 MMHG | DIASTOLIC BLOOD PRESSURE: 66 MMHG | WEIGHT: 157 LBS | HEART RATE: 66 BPM

## 2021-06-05 VITALS
HEART RATE: 66 BPM | DIASTOLIC BLOOD PRESSURE: 78 MMHG | SYSTOLIC BLOOD PRESSURE: 110 MMHG | OXYGEN SATURATION: 97 % | WEIGHT: 167 LBS | BODY MASS INDEX: 23.29 KG/M2

## 2021-06-05 NOTE — PATIENT INSTRUCTIONS - HE
1. Reassured about nipple tenderness, can use 1% hydrocortisone cream prn as needed.     2 Discussed peripheral neuropathy.  Reassured, not likely to become significant.  Etiology uncertain given testing.  Will follow.     3. Follow up 6 months.     4. Lab tests today.

## 2021-06-05 NOTE — PROGRESS NOTES
Patient presents at the request of Dr. Brijesh Ontiveros for a lower extremity EMG.  He has bilateral foot paresthesias or sensation as he is walking on tissue paper over the metatarsal arch of both feet into the toes for the past 5 years.  Right greater than or equal to the left.  He does have a history of psoriasis.  Does have atrophy of the intrinsic muscles of both feet.  History of bunionectomy bilaterally.    EMG/NCS  results: Please see scanned full report.    Comment NCS: Abnormal study  1.  Low amplitude and borderline/mildly prolonged latency bilateral sural SNAPs  2.  Absent right superficial peroneal SNAP.  3.  Diffuse low amplitude bilateral lower extremity CMAPs.  4.  Technically difficult study.  He had violent jerking movements with stimulation of the lower extremity frequently kicking the electrodes off of his legs.  Limited study was performed today.    Comment EMG: Normal study  1.  Normal needle EMG right lower extremity.    Interpretation: Abnormal study: There is electrodiagnostic evidence of:    1.  There is electrodiagnostic evidence consistent with, but not confirmatory for, a sensorimotor peripheral polyneuropathy, predominantly axonal.  This was a limited nerve conduction study today given relatively poor tolerance of the nerve conduction study portion of the exam.      2. There is no electrodiagnostic evidence of lumbosacral radiculopathy, lumbosacral plexopathy, or focal neuropathy in the right lower extremity.    The testing was completed in its entirety by the physician.      It was our pleasure caring for your patient today, if there any questions or concerns please do not hesitate to contact us.

## 2021-06-05 NOTE — PROGRESS NOTES
ASSESSMENT AND PLAN:    1. Psoriatic arthritis   Refilled.   - meloxicam (MOBIC) 7.5 MG tablet; Take 1 tablet p.o. twice daily or 2 tabs p.o. daily, PRN. Take with food.  Dispense: 60 tablet; Refill: 2    2. Breast tenderness in male  Exam today is negative.  No gynecomastia.  Possibly the spironolactone plays a role.  He doesn't wear a T shirt and he may have irritable them, particularly the left nipple.  He can use 1% hydrocortisone prn.     4. Peripheral polyneuropathy  Recent positive EMG, mild feet symptoms, dysesthesia, not pain.  Metabolic evaluation was negative.  Reassured.  Will follow.  He declines a trial of gabapentin.     Patient Instructions   1. Reassured about nipple tenderness, can use 1% hydrocortisone cream prn as needed.     2 Discussed peripheral neuropathy.  Reassured, not likely to become significant.  Etiology uncertain given testing.  Will follow.     3. Follow up 6 months.     4. Lab tests today.     CHIEF COMPLAINT:  Chief Complaint   Patient presents with     Breast Problem     nipples feel sore, bilateral     Foot Problem     bilateral, feels uncomfortable     HISTORY OF PRESENT ILLNESS:  Shay Mendosa is a 59 y.o. male here with some tenderness in breast nipples, particularly on the left.  No discharge or drainage.  Also his feet do feel 'spongy' as he puts it, not weak, no foot drop.  Hands and legs are OK, no pains.     REVIEW OF SYSTEMS:   See HPI, all other systems on review are negative.    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     Other irritable bowel syndrome 4/4/2019     Peripheral neuropathy      Psoriasis      Psoriatic arthritis (H)      Vitamin D deficiency      Social History     Tobacco Use   Smoking Status Never Smoker   Smokeless Tobacco Never Used     Family History  "  Problem Relation Age of Onset     Alcohol abuse Father      Heart disease Father      Past Surgical History:   Procedure Laterality Date     FOOT ARTHRODESIS, MODIFIED STERN Bilateral      VITALS:  Vitals:    01/22/20 0849   BP: 104/62   Patient Site: Left Arm   Patient Position: Sitting   Cuff Size: Adult Regular   Pulse: 72   SpO2: 98%   Weight: 162 lb 7 oz (73.7 kg)   Height: 5' 11\" (1.803 m)     Wt Readings from Last 3 Encounters:   01/22/20 162 lb 7 oz (73.7 kg)   12/03/19 162 lb (73.5 kg)   10/31/19 160 lb (72.6 kg)     PHYSICAL EXAM:  Constitutional:  In NAD, alert and oriented  Breasts:  Nipples are normal, the left is slightly swollen and erythematous, no secretion or lumps, no gynecomastia is evident, no cellulitis or impetiginous change  Extremities: feet are normal on exam, possibly decreased sensation    DECISION TO OBTAIN OLD RECORDS AND/OR OBTAIN HISTORY FROM SOMEONE OTHER THAN PATIENT, AND/OR ACCESSING CARE EVERYWHERE):  1  0     REVIEW AND SUMMARIZATION OF OLD RECORDS, AND/OR OBTAINING HISTORY FROM SOMEONE OTHER THAN PATIENT, AND/OR DISCUSSION OF CASE WITH ANOTHER HEALTH CARE PROVIDER:  2 reviewed recent evaluation for neuropathy    REVIEW AND/OR ORDER OF OF CLINICAL LAB TESTS: 1  Reviewed recent lab testing.    REVIEW AND/OR ORDER OF RADIOLOGY TESTS: 1 0.    REVIEW AND/OR ORDER OF MEDICAL TESTS (EKG/ECHO/COLONOSCOPY/EGD): 1  Reviewed recent EMG report    INDEPENDENT  VISUALIZATION OF IMAGE, TRACING, OR SPECIMEN ITSELF (2 EACH):  0     TOTAL: 4    Current Outpatient Medications   Medication Sig Dispense Refill     meloxicam (MOBIC) 7.5 MG tablet Take 1 tablet p.o. twice daily or 2 tabs p.o. daily, PRN. Take with food. 60 tablet 2     spironolactone (ALDACTONE) 25 MG tablet Take 3 tablets (75 mg total) by mouth 2 (two) times a day at 9am and 6pm. 120 tablet 11     Samuel Almonte MD  Internal Medicine  [facility]  "

## 2021-06-05 NOTE — PATIENT INSTRUCTIONS - HE
Thank you for choosing the [facility] for your EMG testing.    The ordering provider will receive your final EMG results within the next few days.  Please follow up with your provider for the results and further treatment recommendations.

## 2021-06-07 NOTE — TELEPHONE ENCOUNTER
Dr. Baig,  No labs are currently ordered for the patient. Please advise as to what should be placed and I can set that up for you to review.  Thank you.  Teresa CUEVA, CMA/CMT....................7:46 AM

## 2021-06-07 NOTE — TELEPHONE ENCOUNTER
Test Results  Who is calling?:  Patient  Who ordered the test:  Samuel Almonte MD  Type of test: Lab  Date of test:  04/15/2020  Where was the test performed:  In clinic ([facility])  What are your questions/concerns?:  Pt states his primary provider ordered lab work after his virtual visit, Pt states he went to [facility] as a walk-in.  Pt states they confirmed information.  Writer however does not see an order, or pending labs.  Please advise.  Pt would like a call.  Okay to leave a detailed message?:  Yes

## 2021-06-07 NOTE — PROGRESS NOTES
"Shay Mendosa is a 59 y.o. male who is being evaluated via a billable telephone visit.      Shay Mendosa complains of    Chief Complaint   Patient presents with     Headache     Assessment/Plan:    1. Central serous retinopathy, bilateral  I encouraged him to try the spironolactone 25 mg po two times a day.  He has follow up with ophthalmology in June. Vision seems OK to actually improved.     2. Psoriatic arthropathy   Stable control with meloxicam.     Additional provider notes: he feels that the spironolactone is bothering him.  He is not clear about how.  He feels that when 'stressed' it bothers more.  Vague GI symptoms.  No actual dyspepsia or melena.  His vision 'doesn't go in an out anymore' - actually improved.  He feels OK otherwise.      I have reviewed and updated the patient's Past Medical History, Social History, Family History, Allergies and Medication List.    The patient has been notified of following:     \"This telephone visit will be conducted via a call between you and your physician/provider. We have found that certain health care needs can be provided without the need for a physical exam.  This service lets us provide the care you need with a short phone conversation.  If a prescription is necessary we can send it directly to your pharmacy.  If lab work is needed we can place an order for that and you can then stop by our lab to have the test done at a later time.    If during the course of the call the physician/provider feels a telephone visit is not appropriate, you will not be charged for this service.\"      Phone call duration:  8 minutes    CA Intake time 1:30 minutes    Telephone Consent was completed by  staff and confirmed by MB  "

## 2021-06-07 NOTE — TELEPHONE ENCOUNTER
Attempted to contact pt.  Busy signal x 3 attempts.  Will try again later.  Please relay pcp message to pt if pt calls back.

## 2021-06-07 NOTE — TELEPHONE ENCOUNTER
RN cannot approve Refill Request    RN can NOT refill this medication med is not covered by policy/route to provider     . Last office visit: 1/22/2020 Samuel Almonte MD Last Physical: Visit date not found Last MTM visit: Visit date not found Last visit same specialty: 1/22/2020 Samuel Almonte MD.  Next visit within 3 mo: Visit date not found  Next physical within 3 mo: Visit date not found      Terri Zhang, [facility] Triage/Med Refill 4/14/2020    Requested Prescriptions   Pending Prescriptions Disp Refills     meloxicam (MOBIC) 7.5 MG tablet [Pharmacy Med Name: MELOXICAM 7.5 MG TABLET] 60 tablet 2     Sig: TAKE 1 TABLET BY MOUTH TWICE DAILY OR 2 TABLETS BY MOUTH DAILY AS NEEDED. TAKE WITH FOOD.       There is no refill protocol information for this order

## 2021-06-07 NOTE — PROGRESS NOTES
Shay Mendosa who presents today with a chief complaint of  Follow-up      Joint Pains: No    Location: both hand stiffness   Onset: chronic   Intensity:  0/10  AM Stiffness: all day Minutes  Alleviating/Aggravating Factors: no   Tolerating Meds: yes   Other: Chronic obesity Shay      ROS:  Patient denies having any chest pain, shortness of breath, cough, abdominal pain, nausea, vomiting, rashes, fevers, oral ulcers and recent infections.  Patient admits to having a good appetite      Problem List:  Patient Active Problem List   Diagnosis     Hyperlipidemia LDL goal <130     Psoriasis     Psoriatic arthropathy (H)     Central serous retinopathy, bilateral     Intellectual functioning disability     Bone callus     Decreased vision of right eye     Other irritable bowel syndrome     Muscle cramping     Peripheral polyneuropathy     Breast tenderness in male        PMH:   Past Medical History:   Diagnosis Date     Anxiety     chronic stress/intellectually disabled son     Autism spectrum disorder      Central serous retinopathy      Intellectual disability     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     Other irritable bowel syndrome 4/4/2019     Peripheral neuropathy      Psoriasis      Psoriatic arthritis (H)      Vitamin D deficiency        Surgical History:  Past Surgical History:   Procedure Laterality Date     FOOT ARTHRODESIS, MODIFIED STERN Bilateral        Family History:  Family History   Problem Relation Age of Onset     Alcohol abuse Father      Heart disease Father        Social History:   reports that he has never smoked. He has never used smokeless tobacco. He reports that he does not drink alcohol.    Allergies:  Allergies   Allergen Reactions     Sulfa (Sulfonamide Antibiotics)         Current Medications:  Current Outpatient Medications   Medication Sig Dispense Refill     meloxicam (MOBIC) 7.5 MG tablet TAKE 1 TABLET BY MOUTH TWICE DAILY OR 2 TABLETS BY MOUTH DAILY AS NEEDED. TAKE WITH  "FOOD. 60 tablet 2     spironolactone (ALDACTONE) 25 MG tablet Take 3 tablets (75 mg total) by mouth 2 (two) times a day at 9am and 6pm. 120 tablet 11     No current facility-administered medications for this visit.            Physical Exam:  Following up today via phone visit, per Covid-19 pandemic requirements.    Verbal consent has been obtained for this service by a care team member.      Summary/Assessment:        History that includes psoriasis and psoriatic arthritis.    Presents for a follow-up phone visit.    Claims to be doing well with meloxicam 7.5 mg daily.    Latest labs noted to be stable.    Please see below for management plan.      From prior note: Believes he was diagnosed with psoriasis about 25 years ago and psoriatic arthritis about 8 years ago.    Was on methotrexate in the past, discontinued soon after appointment with Dr. Perdomo in 2017 as was concerned contributing to right eye pain eventually told to have central serous retinopathy bilaterally.  Claims to be followed by ophthalmology.      Pertinent rheumatology/past medical history (please refer to above for more detailed history):      Psoriatic arthritis    Psoriasis (referred to dermatology)    Chronic hand pains    Muscle cramping at night (distal lower extremities), mild and tolerable    Central serous retinopathy bilaterally    Hx bunion related surgeries, bilateral first toes      Rheumatology medications provided/suggested:    Meloxicam      Pertinent medication from other providers or from otc (please refer to above for more detailed med list):    Spironolactone (for \"'excess fluid in eyes\")      Pertinent medications already tried:     Methotrexate (eye pain)  Advil  Sulfa allergy      Pertinent lab history:    From 2016, negative/unremarkable: Rheumatoid factor, CCP antibody, XAVIER, hepatitis panel, HIV      Pertinent imaging/test history:    X-rays of hands were unremarkable.        Other:    Denies regular alcohol beverage " intake or tobacco use.    Housekeeping work.     Was a patient of Dr. Perdomo, last seen April 2017.    On past visit, deferred adding a muscle relaxer for occasional muscle cramping of lower extremities.  Plan:      Continue meloxicam 7.5 mg twice daily, PRN instead.  With regards to coronavirus, patient made aware that if develops any shortness of breath should seek immediate medical attention.  If develops other URI like symptoms then should self quarantine for 14 days or try to get tested.    Check labs prior to next visit.    Follow-up in 6 months.      Procedure note:       Spent 8 minutes over phone visit with patient discussing patient's concerns, labs/test results and plan.    This note was transcribed using Dragon voice recognition software as a result unintentional grammatical errors or word substitutions may have occurred. Please contact our Rheumatology department if you need any clarification or if you have any related inquiries.      Greg Cosme DO ....................  5/5/2020   10:09 AM

## 2021-06-07 NOTE — TELEPHONE ENCOUNTER
He is OK to have lab appointment to have BMP.  I have placed an order.  He does not need to be seen.  Dr. Gage MD

## 2021-06-07 NOTE — TELEPHONE ENCOUNTER
"Who is calling:  Patient  Reason for Call:  Patient states he visited eye doctor and is now taking higher dose  if \"spironolacton\" medication, he is taking 50 mg twice daily and states he should come in for lab work for medication increase -patient was unclear about name of clinic or eye care provider he visited with and is unsure if they have faxed orders and is unclear if Dr Almonte usually orders labs for him? Please advise patient if ok to schedule lab appointment or if he should reach out to eye clinic to obtain orders.  Date of last appointment with primary care: na  Okay to leave a detailed message: Yes      "

## 2021-06-08 NOTE — TELEPHONE ENCOUNTER
Pt notified of Dr Chauhan's message and would like to try Sulindac. Pt instructed to take it with food as he said he had some stomach upset with meloxicam as well as the headaches.    Sulindac rx sent to pharmacy per Dr Chauhan.

## 2021-06-08 NOTE — TELEPHONE ENCOUNTER
If desires we can try another anti-inflammatory medication called Sulindac.  If interested can prescribe 200mg, 1 tablet twice daily as needed.  Take with food.    Similar side effect profile as meloxicam however may tolerate better.  If desires, please send patient a handout on this medication.

## 2021-06-08 NOTE — TELEPHONE ENCOUNTER
Pt stopped in and said he is stop taking the Sulindac 200 mg, he is better and doesn't like taking it gave him heartburn.Wanted you to know.

## 2021-06-09 NOTE — PROGRESS NOTES
"Shay Mendosa  1960      Assessment and Plan:  1. Rash left forearm? Contact with soap or watch. Steroid cream (he has), wear watch on left wrist. Reassure. Not likely serious      Chief Complaint: rash    Visit diagnoses:    1. Dermatitis, contact         Meds:  Current Outpatient Prescriptions   Medication Sig Dispense Refill     cholecalciferol, vitamin D3, (VITAMIN D3) 2,000 unit cap Take 2 tablets by mouth daily. take one a day after done with prescription.       folic acid (FOLVITE) 1 MG tablet Take 1 tablet (1 mg total) by mouth daily. 30 tablet 11     KRILL/OM-3/DHA/EPA/PHOSPHO/AST (MEGARED OMEGA-3 KRILL OIL ORAL) Take by mouth daily.       methotrexate 2.5 MG tablet TAKE 8 TABLETS (20 MG TOTAL) BY MOUTH ONCE A WEEK. 32 tablet 1     mometasone (ELOCON) 0.1 % ointment APPLY SPARINGLY TO AFFECTED AREA(S) ONCE DAILY. 45 g 3     sodium fluoride (CLINPRO 5000) 1.1 % Pste Apply 1 mg to teeth daily.       tamsulosin (FLOMAX) 0.4 mg Cp24 Take 1 capsule (0.4 mg total) by mouth daily after supper. 30 capsule 12     No current facility-administered medications for this visit.        Allergies   Allergen Reactions     Sulfa (Sulfonamide Antibiotics)        ROS: complete review of symptoms otherwise negative except as noted below    S:  Patch of itchy skin left forearm. Psoriasis under great control with MTX. Some exposure to soaps; new watchband (expensive);        O:   Vitals:    03/28/17 1545   BP: 134/84   Patient Site: Left Arm   Patient Position: Sitting   Cuff Size: Adult Regular   Pulse: 64   SpO2: 98%   Weight: 156 lb (70.8 kg)   Height: 5' 11\" (1.803 m)       Physical Exam:  General-  VS- see above; pleasant , mentally slow as noted. Works in housekeeping at   Skin- no suspicious lesions for malignancy; irregular dermatitis left mid forearm. Not really in contact with watchband; remarkable clearing of psoriatic lesions on legs.         Samuel Balderas MD              "

## 2021-06-10 NOTE — PROGRESS NOTES
"Shay Mendosa  1960      Assessment and Plan:  1. New problem- see retina note; Chronic retinopathy- central serous. Bilateral; noted incidentally at [facility] and referred to Retina specialist (Dr. Garcia)  2. Psoriasis/severe- improved on MTX/steroid cream, but given #1 he will d/c  steroid cream for now.  Review methotrexate plan with  next week   3. Long discussion with Aubrey. Reassurance for now. Next retina appt in June 19.  4. RTC clinic post June 19 to review plan re retina/       Chief Complaint: f/u     Visit diagnoses:    1. Psoriatic arthropathy     2. Central serous retinopathy, bilateral     3. Polyarthralgia     4. High risk medication use     5. Psoriasis         Meds:  Current Outpatient Prescriptions   Medication Sig Dispense Refill     mometasone (ELOCON) 0.1 % ointment APPLY SPARINGLY TO AFFECTED AREA(S) ONCE DAILY. 45 g 3     tamsulosin (FLOMAX) 0.4 mg Cp24 Take 1 capsule (0.4 mg total) by mouth daily after supper. 30 capsule 12     cholecalciferol, vitamin D3, (VITAMIN D3) 2,000 unit cap Take 2 tablets by mouth daily. take one a day after done with prescription.       folic acid (FOLVITE) 1 MG tablet Take 1 tablet (1 mg total) by mouth daily. 30 tablet 11     KRILL/OM-3/DHA/EPA/PHOSPHO/AST (MEGARED OMEGA-3 KRILL OIL ORAL) Take by mouth daily.       methotrexate 2.5 MG tablet TAKE 8 TABLETS (20 MG TOTAL) BY MOUTH ONCE A WEEK. 32 tablet 1     sodium fluoride (CLINPRO 5000) 1.1 % Pste Apply 1 mg to teeth daily.       No current facility-administered medications for this visit.        Allergies   Allergen Reactions     Sulfa (Sulfonamide Antibiotics)        ROS: complete review of symptoms otherwise negative except as noted below    S: Aubrey went to an optometrist chain to get his eyes checked and he had evidence of retinopathy he then saw the retina specialist and he has central serous retinopathy involving both eyes with some chronic findings.  He needs to get off the " "steroid cream he will be check with Dr. Merino regarding the plan for methotrexate.  His symptoms have improved significantly in regard to his psoriasis and his joint pains. Aubrey is a bit slow intellectually as noted before he is somewhat emotionally stressed with this but seems to understand the situation and overall the prognosis would seem to be favorable       O:   Vitals:    04/20/17 1003   BP: 120/76   Patient Site: Left Arm   Patient Position: Sitting   Cuff Size: Adult Regular   Pulse: (!) 56   Resp: 16   Temp: 97.5  F (36.4  C)   TempSrc: Oral   Weight: 160 lb 9.6 oz (72.8 kg)   Height: 5' 11\" (1.803 m)       Physical Exam:  General-very pleasant intellectually somewhat slow man functions independently works in housekeeping at [employer]  VS- see above  HEENT- neg   Neck- no adenopathy/thyromegaly/bruits  Chest- clear   Cor- reg no murmurs/gallops/ectopics  No results found for this or any previous visit (from the past 240 hour(s)).      Lab Results   Component Value Date    WBC 4.6 04/10/2017    HGB 13.5 (L) 04/10/2017    HCT 39.8 (L) 04/10/2017    MCV 88 04/10/2017     04/10/2017     Lab Results   Component Value Date    CREATININE 0.72 04/10/2017    BUN 27 (H) 11/15/2016     11/15/2016    K 4.0 11/15/2016     11/15/2016    CO2 30 11/15/2016         Samuel Balderas MD              "

## 2021-06-10 NOTE — PROGRESS NOTES
"ASSESSMENT AND PLAN:  Shay Mendosa 56 y.o. male is here for follow-up of psoriatic arthritis, osteoarthritis, polyarthralgias.  He feels that methotrexate has helped him a lot with his joint pains.  There is no residual pain now.  He is concerned with his right eye which sounds like intraocular pressure.  He is working with his ophthalmologist.  He wondered if that might be due to the topicals corticosteroid cream. He is going to check with his prescriber for that.  As for as the methotrexate goes I have asked him to stay on course.  He would however like to cut back to 6 because of his concerns around immunosuppression.  His recent labs are normal.  We will meet here in 4 months.   labs every 2.      Diagnoses and all orders for this visit:    Psoriatic arthropathy  -     methotrexate 2.5 MG tablet; TAKE 6 TABLETS (20 MG TOTAL) BY MOUTH ONCE A WEEK.  Dispense: 24 tablet; Refill: 1    Psoriasis    Central serous retinopathy, bilateral    High risk medication use      HISTORY OF PRESENTING ILLNESS:  Shay Mendosa, 56 y.o., male is here for e follow-up of psoriatic arthritis.  He reports that the symptoms began when he turned 49.  He had moderately severe pain.  This affected his PIP joints especially middle and index finger more so on the right than the left.  He had intermittent discomfort in his feet had surgery on his first MTPs.  He had noted that he rather not take the shots which were prescribed to him of methotrexate but would take oral methotrexate.  On his previous visit he was still not well controlled on 15 mg of methotrexate.  We had increased this to 20 that has worked well.  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  He is working with his ophthalmologist and primary physician.  He while has \"0\" pain and he would like to get back down to 6 tablets of methotrexate from 8.  He is concerned that he has had more \"sniffles \"since he went up to 8.  He is " concerned that his immune system is not as robust.  We had a detailed discussion around this issue.   He is able to do most of his day-to-day activities.  He works as a  40 hours per week.  There is no family history of rheumatoid arthritis and ankylosing spondylitis than that he is aware of.  Neither neither days history of psoriasis in the family or ulcerative colitis or Crohn's disease.  He is a nonsmoker does not take alcohol he runs regularly 5-10 miles per week or more he gets a good night rest..  He reports a history of anxiety.  No features suggestive of iritis.  Further historical information and ADL limitations as noted in the multidimensional health assessment questionnaire attached in the EMR.  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     Prostatitis      Psoriasis      Vitamin D deficiency      History   Smoking Status     Never Smoker   Smokeless Tobacco     Not on file     Patient Active Problem List   Diagnosis     Prostatitis     Anxiety     Myalgia And Myositis     Hyperlipidemia     Psoriasis     Vitamin D Deficiency     Psoriatic Arthropathy     High risk medication use     Central serous retinopathy, bilateral     Current Outpatient Prescriptions   Medication Sig Dispense Refill     tamsulosin (FLOMAX) 0.4 mg Cp24 Take 1 capsule (0.4 mg total) by mouth daily after supper. 30 capsule 12     cholecalciferol, vitamin D3, (VITAMIN D3) 2,000 unit cap Take 2 tablets by mouth daily. take one a day after done with prescription.       folic acid (FOLVITE) 1 MG tablet Take 1 tablet (1 mg total) by mouth daily. 30 tablet 11     KRILL/OM-3/DHA/EPA/PHOSPHO/AST (MEGARED OMEGA-3 KRILL OIL ORAL) Take by mouth daily.        methotrexate 2.5 MG tablet TAKE 8 TABLETS (20 MG TOTAL) BY MOUTH ONCE A WEEK. 32 tablet 1     mometasone (ELOCON) 0.1 % ointment APPLY SPARINGLY TO AFFECTED AREA(S) ONCE DAILY. 45 g 3     sodium fluoride (CLINPRO 5000) 1.1 % Pste Apply 1 mg to teeth daily.       No current facility-administered medications for this visit.        DETAILED EXAMINATION  04/24/17  :  Vitals:    04/24/17 0907   BP: 120/62   Pulse: 64   Weight: 160 lb (72.6 kg)     Alert oriented. Head including the face is examined for malar rash, heliotropes, scarring, lupus pernio. Eyes examined for redness such as in episcleritis/scleritis, periorbital lesions.   Neck examined  for lymph nodes, range of motion Both upper and lower extremities (all four) examined for swollen, warm &/or  tender joints, range of motion, rash, muscle weakness, edema. The salient normal / abnormal findings are appended.    He has mild onychomycosis there is no nail pitting.  He has intact range of motion in the root joints bilaterally.  No ocular inflammation.  He has somewhat of lesser extensive involvement of psoriasis affecting his upper and lower extremities extremities extensor surfaces.  No appreciable synovitis in any of the palpable appendicular joints.   LAB / IMAGING DATA:  ALT   Date Value Ref Range Status   04/10/2017 15 0 - 45 U/L Final   01/30/2017 16 0 - 45 U/L Final   12/15/2016 15 0 - 45 U/L Final     Albumin   Date Value Ref Range Status   04/10/2017 3.6 3.5 - 5.0 g/dL Final   01/30/2017 3.6 3.5 - 5.0 g/dL Final   12/15/2016 3.9 3.5 - 5.0 g/dL Final     Creatinine   Date Value Ref Range Status   04/10/2017 0.72 0.70 - 1.30 mg/dL Final   01/30/2017 0.73 0.70 - 1.30 mg/dL Final   12/15/2016 0.72 0.70 - 1.30 mg/dL Final       WBC   Date Value Ref Range Status   04/10/2017 4.6 4.0 - 11.0 thou/uL Final   01/30/2017 3.7 (L) 4.0 - 11.0 thou/uL Final   03/02/2015 5.4 4.0 - 11.0 thou/uL Final     Hemoglobin   Date Value Ref Range Status   04/10/2017 13.5 (L)  14.0 - 18.0 g/dL Final   01/30/2017 13.9 (L) 14.0 - 18.0 g/dL Final   12/15/2016 14.5 14.0 - 18.0 g/dL Final     Platelets   Date Value Ref Range Status   04/10/2017 270 140 - 440 thou/uL Final   01/30/2017 235 140 - 440 thou/uL Final   12/15/2016 277 140 - 440 thou/uL Final       Lab Results   Component Value Date    RF <15.0 06/06/2016    SEDRATE 8 06/06/2016

## 2021-06-11 NOTE — PROGRESS NOTES
Shay Mendosa  1960      Assessment and Plan:  1 psoriasis and related arthropathy he is decided to hold the methotrexate because of oral ulcers they are resolving he is on folic acid, is convinced the methotrexate may have contributed to his serous retinopathy.  He will use a steroid cream sparingly and then needs to check in with  a job        Chief Complaint: Follow-up multiple    Visit diagnoses:    1. Psoriatic arthropathy  folic acid (FOLVITE) 1 MG tablet    DISCONTINUED: folic acid (FOLVITE) 1 MG tablet   2. Psoriasis  mometasone (ELOCON) 0.1 % ointment   3. Psoriasis     4. Intellectual functioning disability         Meds:  Current Outpatient Prescriptions   Medication Sig Dispense Refill     cholecalciferol, vitamin D3, (VITAMIN D3) 2,000 unit cap Take 2 tablets by mouth daily. take one a day after done with prescription.       KRILL/OM-3/DHA/EPA/PHOSPHO/AST (MEGARED OMEGA-3 KRILL OIL ORAL) Take by mouth daily.       mometasone (ELOCON) 0.1 % ointment APPLY SPARINGLY TO AFFECTED AREA(S) ONCE DAILY. 45 g 3     sodium fluoride (CLINPRO 5000) 1.1 % Pste Apply 1 mg to teeth daily.       tamsulosin (FLOMAX) 0.4 mg Cp24 Take 1 capsule (0.4 mg total) by mouth daily after supper. 30 capsule 12     folic acid (FOLVITE) 1 MG tablet Three daily while on MTX. 90 tablet 11     methotrexate 2.5 MG tablet TAKE 6 TABLETS (20 MG TOTAL) BY MOUTH ONCE A WEEK. 24 tablet 1     No current facility-administered medications for this visit.        Allergies   Allergen Reactions     Sulfa (Sulfonamide Antibiotics)        ROS: complete review of symptoms otherwise negative except as noted below    S: Aubrey is here today and more so than usual is even more vague as far as what is going on and how I can specifically help him today.  He had something under the nail bed on 1 of his fingers which looks like a simple sliver we tried to remove it but it should grow out not be a problem it is certainly is no infection or concern  there.  Your ulcers are resolving on the folic acid he did size to stop the methotrexate but will check with rheumatology not aware of any relationship between central serous retinopathy and methotrexate.  He will minimize a steroid cream which may possibly be related to the retinopathy.  He will stay on the folic acid the oral lesions have pretty much resolved.  He has more of an odd vague affect today than usual and not exactly sure that he has insight into his situation.       O:   Vitals:    06/05/17 0857   BP: 134/86   Patient Site: Left Arm   Patient Position: Sitting   Cuff Size: Adult Regular   Pulse: 65   Weight: 159 lb 9.6 oz (72.4 kg)       Physical Exam:  General-  VS- see above  Skin shows a psoriatic plaques no cellulitis.  Vague circumstantial type of affect and description of problems not entirely new but this seems to be worse than usual      Samuel Balderas MD      Total time 25 minutes greater than 50% spent counseling reviewing medications possible side effects treatment options etc. also it is important that he follow-up with rheumatology and the retina specialist

## 2021-06-12 NOTE — PATIENT INSTRUCTIONS - HE
Summary of Your Rheumatology Visit    Next Appointment:  6 Months    Medications:    If experiencing joint pain recommend first trying over-the-counter Tylenol 500-1000 mg twice a day on a when necessary basis for pain relief.      Referrals:      Tests:        Injections:        Other:

## 2021-06-12 NOTE — PROGRESS NOTES
Shay Mendosa who presents today with a chief complaint of  No chief complaint on file.      Joint Pains: yes  Location: fingers  Onset: chronic  Intensity:  0/10  AM Stiffness: n/a  Alleviating/Aggravating Factors: n/a  Tolerating Meds: n/a  Other:      ROS:  Patient denies having any chest pain, shortness of breath, cough, abdominal pain, nausea, vomiting, rashes, fevers, oral ulcers and recent infections.  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     Other irritable bowel syndrome 4/4/2019     Peripheral neuropathy      Psoriasis      Psoriatic arthritis (H)      Vitamin D deficiency        Surgical History:  Past Surgical History:   Procedure Laterality Date     FOOT ARTHRODESIS, MODIFIED STERN Bilateral        Family History:  Family History   Problem Relation Age of Onset     Alcohol abuse Father      Heart disease Father      Colon polyps Sister        Social History:   reports that he has never smoked. He has never used smokeless tobacco. He reports that he does not drink alcohol.    Allergies:  Allergies   Allergen Reactions     Sulfa (Sulfonamide Antibiotics)         Current Medications:  No current outpatient medications on file.     No current facility-administered medications for this visit.            Physical Exam:    Following up today via phone visit, per Covid-19 pandemic requirements.    Verbal consent has been  "obtained for this service by a care team member.    Phone call start time: 8:47 AM    Phone call end time: 8:59 AM    Phone number utilized: [[phone]]      Summary/Assessment:      History that includes psoriasis and psoriatic arthritis.    Presents for a follow-up phone visit.    Claims to be doing well at this time.    Not taking meloxicam.  Adds that meloxicam as well when taking contributed to headaches.  Is not interested in having another anti-inflammatory medication readily available at this time as he is doing well.    Claims that his skin psoriasis is mild and tolerable. Currently not seeing dermatology and deferred referral to dermatology at this time.    Had labs via PCP which just showed some mild elevated cholesterol otherwise remainder of labs were stable.  Patient states that he spoke to his PCP regarding results just recently.    Please see below for management plan.    From prior note: Believes he was diagnosed with psoriasis about 25 years ago and psoriatic arthritis about 8 years ago.    Was on methotrexate in the past, discontinued soon after appointment with Dr. Perdomo in 2017 as was concerned contributing to right eye pain eventually told to have central serous retinopathy bilaterally.  Claims to be followed by ophthalmology.      Pertinent rheumatology/past medical history (please refer to above for more detailed history):      Psoriatic arthritis    Psoriasis (referred to dermatology)    Chronic hand pains    Muscle cramping at night (distal lower extremities), mild and tolerable    Central serous retinopathy bilaterally    Hx bunion related surgeries, bilateral first toes      Rheumatology medications provided/suggested:    Tylenol      Pertinent medication from other providers or from otc (please refer to above for more detailed med list):    Spironolactone (for \"'excess fluid in eyes\")      Pertinent medications already tried:     Methotrexate (eye pain)  Advil  Sulfa " allergy  Meloxicam (headaches)      Pertinent lab history:    From 2016, negative/unremarkable: Rheumatoid factor, CCP antibody, XAVIER, hepatitis panel, HIV      Pertinent imaging/test history:    X-rays of hands were unremarkable.        Other:    Denies regular alcohol beverage intake or tobacco use.    Housekeeping work.     Was a patient of Dr. Perdomo, last seen April 2017.    On past visit, deferred adding a muscle relaxer for occasional muscle cramping of lower extremities.      Plan:      For now patient is doing well.  Made aware that if develops joint pains can take Tylenol 500-1000 mg twice daily as needed.  If insufficient relief with Tylenol he can contact us for an anti-inflammatory medication/NSAID.    Currently not seeing dermatology and deferred referral to dermatology at this time.    Follow-up in 6 months.        This note was transcribed using Dragon voice recognition software as a result unintentional grammatical errors or word substitutions may have occurred. Please contact our Rheumatology department if you need any clarification or if you have any related inquiries.    Major side effect profile of medications provided were discussed with the patient.      Greg Cosme DO    ....................  11/10/2020   7:56 AM

## 2021-06-12 NOTE — PROGRESS NOTES
ASSESSMENT/PLAN:    1. Intellectual functioning disability  Stable, still working but may be laid off soon.  Has worries about this.  No indication of psychosis.  Well groomed with appropriate behavior.      2. Psoriatic arthropathy   Stiffness in hands, no acute episodes or significant progression    3. Psoriasis  Skin involvement, stable.     4. Encounter for general health examination  See documentation    5. Polyneuropathy  EMG positive in feet, now just mild numbness.  Etiology unclear.     6. Screening for prostate cancer  PSA testing.      7. Screening for colon cancer  Had colonoscopy 2012, negative.  Wants to do cologuard.      8.Central serous retinopathy  He has stopped the spironolactone. Says he has told his ophthalmologist and now takes OTC 'eye vitamin'. Does not feel his vision has declined.     CHIEF COMPLAINT:  Chief Complaint   Patient presents with     Annual Exam     HISTORY OF PRESENT ILLNESS:  Shay is a 59 y.o. male presenting to the clinic today for general health evaluation.  He works at [employer] and will be laid off at some point, he says, or they may try to get him work somewhere else.  He feels OK, his psoriatic arthritis has not worsened, hands are stiff in the AM, he says he soaks them.  Also, stopped the spironolactone and taking eye vitamins.  Feels his vision has not changed.  No unusual cough or dyspnea, or any fever.  Denies problems with bowel function.  Chronic frequency with nocturia, no dysuria, not changed.     REVIEW OF SYSTEMS:   Constitutional: no fever, chills, or sweats  Respiratory: No wheezes, cough, shortness of breath  Cardiovascular: No chest pain or palpitations  Gastrointestinal: see HPI  Genitourinary: see HPI  Musculoskeletal: No joint swelling, pain, or unusual back pain  Neurological: No headache, arm or leg numbness or weakness, or gait disturbance  Psychiatric: No anxiety, or depression, or concerns   All other systems on reveiw are  negative.    PFSH:  Social History     Tobacco Use   Smoking Status Never Smoker   Smokeless Tobacco Never Used     Family History   Problem Relation Age of Onset     Alcohol abuse Father      Heart disease Father      Social History     Socioeconomic History     Marital status:      Spouse name: Not on file     Number of children: 1     Years of education: 13     Highest education level: Not on file   Occupational History     Occupation: housekeeping     Employer: [employer]     Comment: [employer]   Social Needs     Financial resource strain: Not on file     Food insecurity     Worry: Not on file     Inability: Not on file     Transportation needs     Medical: Not on file     Non-medical: Not on file   Tobacco Use     Smoking status: Never Smoker     Smokeless tobacco: Never Used   Substance and Sexual Activity     Alcohol use: No     Drug use: Not on file     Sexual activity: Not on file   Lifestyle     Physical activity     Days per week: Not on file     Minutes per session: Not on file     Stress: Not on file   Relationships     Social connections     Talks on phone: Not on file     Gets together: Not on file     Attends Buddhism service: Not on file     Active member of club or organization: Not on file     Attends meetings of clubs or organizations: Not on file     Relationship status: Not on file     Intimate partner violence     Fear of current or ex partner: Not on file     Emotionally abused: Not on file     Physically abused: Not on file     Forced sexual activity: Not on file   Other Topics Concern     Not on file   Social History Narrative    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 Non smoker/non drinker/ lives with 20+ yr old son who has autism variant. Patient enjoys bowling. 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     Other irritable bowel syndrome 4/4/2019     Peripheral neuropathy      Psoriasis      Psoriatic arthritis (H)      Vitamin D deficiency      VITALS:  Vitals:    11/05/20 0748   BP: 106/74   Patient Site: Right Arm   Patient Position: Sitting   Cuff Size: Adult Regular   Pulse: 66   SpO2: 98%   Weight: 166 lb (75.3 kg)   Height: 5' 11\" (1.803 m)     Wt Readings from Last 3 Encounters:   11/05/20 166 lb (75.3 kg)   01/22/20 162 lb 7 oz (73.7 kg)   12/03/19 162 lb (73.5 kg)     Body mass index is 23.15 kg/m .  PHYSICAL EXAM:  General Appearance: In no acute distress  /74 (Patient Site: Right Arm, Patient Position: Sitting, Cuff Size: Adult Regular)   Pulse 66   Ht 5' 11\" (1.803 m)   Wt 166 lb (75.3 kg)   SpO2 98%   BMI 23.15 kg/m    EYES: Clear  NECK:  without cervical or axillary adenopathy  RESPIRATORY: Clear   CARDIOVASCULAR: S1, S2  ABDOMEN: soft, flat, and non-tender, without mass, rebound, or guarding  RECTAL: deferred  GENITOURINARY: normal testes and phallus  EXTREMITIES: No joint swelling, distal pulses diminished, no ulcer or edema  NEUROLOGIC: Non-focal, no arm or leg  weakness, speech is clear, gait is normal  PSYCHIATRIC: Oriented X 3, calm, appropriate in behavior and speech, thinking is clear, autism spectrum factuality    ADDITIONAL HISTORY SUMMARIZED (2): reviewed history of colonoscopy  DECISION TO OBTAIN EXTRA INFORMATION (1): 0   RADIOLOGY TESTS (1): 0  LABS (1): ordered  MEDICINE TESTS (1): 0  INDEPENDENT REVIEW (2 each): 0   TOTAL: 2    "

## 2021-06-13 NOTE — TELEPHONE ENCOUNTER
Reason contacted:  To help the Pt schedule an appointment  Information relayed:  Pt is scheduled for a 6 months F/U on 5/17/2020 for a Phone visit    Additional questions:  No  Further follow-up needed:  No  Okay to leave a detailed message:  Yes

## 2021-06-13 NOTE — PROGRESS NOTES
"Patient would like to be contacted via the following phone number [phone]     ASSESSMENT/PLAN:    1. Anxiety  Situational increase, since job change from [employer] to [employer]. He denies self harm or depression symptoms.  He agrees to start escitalopram 10 mg po daily in the evening.      Follow up in 6 weeks.      CHIEF COMPLAINT:  Chief Complaint   Patient presents with     Anxiety     HISTORY OF PRESENT ILLNESS:  Shay is a 60 y.o. male contacting the clinic today via phone for anxiety.  He has changed job from [employer] to [employer].  He feels stress and anxiety.  No overall sadness.  No self harm ideation.      REVIEW OF SYSTEMS:   All other systems are negative.    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 Non smoker/non drinker/ lives with 20+ yr old son who has autism variant. Patient enjoys bowling. N[employer] high school and one year of trade school      TOBACCO USE:  Social History     Tobacco Use   Smoking Status Never Smoker   Smokeless Tobacco Never Used     VITALS:  There were no vitals filed for this visit.  Wt Readings from Last 3 Encounters:   11/05/20 166 lb (75.3 kg)   01/22/20 162 lb 7 oz (73.7 kg)   12/03/19 162 lb (73.5 kg)     PHYSICAL EXAM:  (observations via Phone)  Alert and oriented in NAD.  He sounds sensible, and appropriate.     The visit lasted a total of 12  minutes     CA Intake Time: 6 min.     I have reviewed and updated the patient's Past Medical History, Social History, Family History as appropriate.    The patient has been notified of following:     \"This telephone visit will be conducted via a call between you and your physician/provider. We have found that certain health care needs can be provided without the need for a physical exam.  This service lets us provide the care you need with a short phone conversation.  If a prescription is necessary we can send it directly to " "your pharmacy.  If lab work is needed we can place an order for that and you can then stop by our lab to have the test done at a later time.    Telephone visits are billed at different rates depending on your insurance coverage. During this emergency period, for some insurers they may be billed the same as an in-person visit.  Please reach out to your insurance provider with any questions.    If during the course of the call the physician/provider feels a telephone visit is not appropriate, you will not be charged for this service.\"    Patient has given verbal consent to a Telephone visit? Yes by Zeynep Castro CMA    Patient would like to receive their AVS by AVS Preference: Lilo.     Samuel Almonte MD        "

## 2021-06-13 NOTE — TELEPHONE ENCOUNTER
Shay is calling and states that he has a gurgling stomach.  Some times it is irritated.  Denies pain, but has a slight ache.  Symptom started one month ago.  Denies vomiting but is having diarrhea.  Diarrhea started yesterday.  Denies diarrhea today, only yesterday.  Shay states that he has also been anxious lately.  Shay is requesting an in person clinic.      COVID 19 Nurse Triage Plan/Patient Instructions    Please be aware that novel coronavirus (COVID-19) may be circulating in the community. If you develop symptoms such as fever, cough, or SOB or if you have concerns about the presence of another infection including coronavirus (COVID-19), please contact your health care provider or visit [email or web address].     Disposition/Instructions    In-Person Visit with provider recommended. Reference Visit Selection Guide.    Thank you for taking steps to prevent the spread of this virus.  o Limit your contact with others.  o Wear a simple mask to cover your cough.  o Wash your hands well and often.    Resources    [facility]: About COVID-19: [email or web address]    CDC: What to Do If You're Sick: www.cdc.gov/coronavirus/2019-ncov/about/steps-when-sick.html    CDC: Ending Home Isolation: www.cdc.gov/coronavirus/2019-ncov/hcp/disposition-in-home-patients.html     CDC: Caring for Someone: www.cdc.gov/coronavirus/2019-ncov/if-you-are-sick/care-for-someone.html     [facility]: Interim Guidance for Hospital Discharge to Home: www.health.[facility].[place]/diseases/coronavirus/hcp/hospdischarge.pdf    [facility] clinical trials (COVID-19 research studies): clinicalaffairs.[facility]-clinical-trials     Below are the COVID-19 hotlines at the [place] Department of Health ([facility]). Interpreters are available.   o For health questions: Call 109-092-3101 or 1-991.293.3223 (7 a.m. to 7 p.m.)  o For questions about schools and childcare: Call 430-531-6567 or 1-156.387.9463 (7 a.m. to 7 p.m.)       Reason for  Disposition    Age > 60 years    Additional Information    Negative: Passed out (i.e., fainted, collapsed and was not responding)    Negative: Shock suspected (e.g., cold/pale/clammy skin, too weak to stand, low BP, rapid pulse)    Negative: Sounds like a life-threatening emergency to the triager    Negative: SEVERE abdominal pain (e.g., excruciating)    Negative: Vomiting red blood or black (coffee ground) material    Negative: Bloody, black, or tarry bowel movements    Negative: Unable to urinate (or only a few drops) and bladder feels very full    Negative: Pain in scrotum persists > 1 hour    Negative: Constant abdominal pain lasting > 2 hours    Negative: Vomiting bile (green color)    Negative: Patient sounds very sick or weak to the triager    Negative: Vomiting and abdomen looks much more swollen than usual    Negative: White of the eyes have turned yellow (i.e., jaundice)    Negative: Blood in urine (red, pink, or tea-colored)    Negative: Fever > 103 F (39.4 C)    Negative: Fever > 101 F (38.3 C) and over 60 years of age    Negative: Fever > 100.0 F (37.8 C) and has diabetes mellitus or a weak immune system (e.g., HIV positive, cancer chemotherapy, organ transplant, splenectomy, chronic steroids)    Negative: Fever > 100.0 F (37.8 C) and bedridden (e.g., nursing home patient, stroke, chronic illness, recovering from surgery)    Negative: MILD pain that comes and goes (cramps) lasts > 24 hours    Protocols used: ABDOMINAL PAIN - MALE-A-OH

## 2021-06-13 NOTE — TELEPHONE ENCOUNTER
Please ask him to come in for a UA.  I have placed an order.  He should be informed to avoid drinking excess fluids, and he doesn't need to take the over the counter prostate medication.  Dr. Gage MD

## 2021-06-13 NOTE — TELEPHONE ENCOUNTER
"FYI - Status Update  Who is Calling: Patient  Update: States his prostate is acting up especially after caffeine intake so he has cut back on this. Patient has been drinking cranberry juice as well and \"keeps me in check\". Has been taking an over the counter prostate pill, would like to know if he needs to continue this, make an appointment or if something else can be prescribed.  Patient also states there has been an increase in urination. Slept until 2:00 this morning but then was up ever since to use the bathroom. Please advise  Okay to leave a detailed message?:  Yes    "

## 2021-06-13 NOTE — TELEPHONE ENCOUNTER
Left a message to a family member to have to pt call at [phone] to schedule appt.    Next Appointment:  6 Months

## 2021-06-13 NOTE — TELEPHONE ENCOUNTER
Face-to-face visit is not required, however I see that he went to urgent care clinic in [place] already    I would advise treatment of stomach acid with acid suppression after review of December 16 note regarding stress    Recommend Pepcid 20 mg twice daily for a month    He may also choose to take the recommendations he received during his office visit

## 2021-06-14 NOTE — TELEPHONE ENCOUNTER
"\"I started taking lexapro not too long ago and ever since I have to pee a lot more . I get up a few times during the night to go. I usually take the medication during the day while I'm working.\"  Denies other sx   Patient has an appt on 1/4/21.  Advised to discuss at this time  Advised if you experience any other urinary sx before then to call back  Niyah Small RN  [facility]  COVID 19 Nurse Triage Plan/Patient Instructions    Please be aware that novel coronavirus (COVID-19) may be circulating in the community. If you develop symptoms such as fever, cough, or SOB or if you have concerns about the presence of another infection including coronavirus (COVID-19), please contact your health care provider or visit [email or web address].     Disposition/Instructions    In-Person Visit with provider recommended. Reference Visit Selection Guide.    Thank you for taking steps to prevent the spread of this virus.  o Limit your contact with others.  o Wear a simple mask to cover your cough.  o Wash your hands well and often.    Resources    [facility]: About COVID-19: [email or web address]    CDC: What to Do If You're Sick: www.cdc.gov/coronavirus/2019-ncov/about/steps-when-sick.html    CDC: Ending Home Isolation: www.cdc.gov/coronavirus/2019-ncov/hcp/disposition-in-home-patients.html     CDC: Caring for Someone: www.cdc.gov/coronavirus/2019-ncov/if-you-are-sick/care-for-someone.html     [facility]: Interim Guidance for Hospital Discharge to Home: www.health.[facility].[place]/diseases/coronavirus/hcp/hospdischarge.pdf    [facility] clinical trials (COVID-19 research studies): [email or web address].[facility].edu/[facility]-clinical-trials     Below are the COVID-19 hotlines at the [place] Department of Health ([facility]). Interpreters are available.   o For health questions: Call 311-883-6234 or 1-485.770.2926 (7 a.m. to 7 p.m.)  o For questions about schools and childcare: Call 930-421-5345 or 1-390.418.5295 (7 a.m. to 7 " p.m.)       Reason for Disposition    Has to get out of bed to urinate > 2 times a night (i.e., nocturia)    Additional Information    Negative: Dribbling (losing urine) just after finishing urination (i.e., post-void dribbling)    Negative: Urination is difficult to start (i.e., hesitancy) or straining    Negative: [1] Can't control passage of urine (i.e., urinary incontinence, wetting self) AND [2] present > 2 weeks    Negative: Side (flank) or lower back pain present    Negative: [1] Can't control passage of urine (i.e., urinary incontinence) AND [2] new onset (< 2 weeks) or worsening    Negative: Urinating more frequently than usual (i.e., frequency)    Negative: Bad or foul-smelling urine    Negative: Fever > 100.5 F (38.1 C)    Protocols used: URINARY SYMPTOMS-A-AH

## 2021-06-14 NOTE — PROGRESS NOTES
Shay Mendosa  1960      Assessment and Plan:  1 annual exam no new diagnoses  2 history of rather severe psoriasis improved but currently not on any special medicine other than creams  3 routine labs ordered  4 return to clinic approximately 6 months or so sooner if any problems concerns or issues    Chief Complaint: Annual    Visit diagnoses:    1. Healthcare maintenance     2. Hyperlipemia  Comprehensive Metabolic Panel    HM2(CBC w/o Differential)    LDL Cholesterol, Direct   3. 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 Non smoker/non drinker/ lives with 20+ yr old son who has autism variant. Patient enjoys bowling. [employer] high school and one year of trade school      Family History   Problem Relation Age of Onset     Alcohol abuse Father      Heart disease Father        Meds:  Current Outpatient Prescriptions   Medication Sig Dispense Refill      "cholecalciferol, vitamin D3, (VITAMIN D3) 2,000 unit cap Take 2 tablets by mouth daily. take one a day after done with prescription.       KRILL/OM-3/DHA/EPA/PHOSPHO/AST (MEGARED OMEGA-3 KRILL OIL ORAL) Take by mouth daily.       mometasone (ELOCON) 0.1 % ointment APPLY SPARINGLY TO AFFECTED AREA(S) ONCE DAILY. 45 g 3     sodium fluoride (CLINPRO 5000) 1.1 % Pste Apply 1 mg to teeth daily.       folic acid (FOLVITE) 1 MG tablet Take 3 tablets (3 mg total) by mouth daily. Three daily while on MTX. 270 tablet 3     No current facility-administered medications for this visit.        Allergies   Allergen Reactions     Sulfa (Sulfonamide Antibiotics)        ROS: complete review of symptoms otherwise negative except as noted below    S: Aubrey presents today for his annual exam is actually doing quite well.  He did not tolerate methotrexate for psoriasis.  He uses some absence salts and other topical treatments and he is very pleased with the response.  No major joint issues or concerns or other problems today.  He still lives with his autistic son and his son is doing well.  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        O:   Vitals:    11/24/17 1454   BP: 118/62   Patient Site: Left Arm   Patient Position: Sitting   Cuff Size: Adult Regular   Pulse: 65   Weight: 163 lb 8 oz (74.2 kg)   Height: 5' 11\" (1.803 m)       Physical Exam:  General-very pleasant man somewhat vague affect as noted before; probably some mild cognitive issues but functions well  VS- see above  HEENT- neg   Neck- no adenopathy/thyromegaly/bruits  Chest- clear   Cor- reg no murmurs/gallops/ectopics  Extremities: no edema, good distal pulses  Neuro- Cr. NN-  intact, alert,   Abdomen- soft non tender, no masses; no organomegaly  Skin- no suspicious lesions for malignancy patches of psoriasis much improved from several years ago    Recent Results (from the past 240 hour(s))   Comprehensive Metabolic Panel   Result Value Ref Range    " Sodium 143 136 - 145 mmol/L    Potassium 4.5 3.5 - 5.0 mmol/L    Chloride 106 98 - 107 mmol/L    CO2 24 22 - 31 mmol/L    Anion Gap, Calculation 13 5 - 18 mmol/L    Glucose 83 70 - 125 mg/dL    BUN 31 (H) 8 - 22 mg/dL    Creatinine 0.76 0.70 - 1.30 mg/dL    GFR MDRD Af Amer >60 >60 mL/min/1.73m2    GFR MDRD Non Af Amer >60 >60 mL/min/1.73m2    Bilirubin, Total 0.6 0.0 - 1.0 mg/dL    Calcium 9.2 8.5 - 10.5 mg/dL    Protein, Total 7.2 6.0 - 8.0 g/dL    Albumin 3.6 3.5 - 5.0 g/dL    Alkaline Phosphatase 63 45 - 120 U/L    AST 21 0 - 40 U/L    ALT 13 0 - 45 U/L   HM2(CBC w/o Differential)   Result Value Ref Range    WBC 6.7 4.0 - 11.0 thou/uL    RBC 4.68 4.40 - 6.20 mill/uL    Hemoglobin 14.0 14.0 - 18.0 g/dL    Hematocrit 40.8 40.0 - 54.0 %    MCV 87 80 - 100 fL    MCH 29.9 27.0 - 34.0 pg    MCHC 34.3 32.0 - 36.0 g/dL    RDW 12.3 11.0 - 14.5 %    Platelets 265 140 - 440 thou/uL    MPV 7.4 7.0 - 10.0 fL   LDL Cholesterol, Direct   Result Value Ref Range    Direct  (H) <=129 mg/dl       Samuel Balderas MD

## 2021-06-14 NOTE — PROGRESS NOTES
ASSESSMENT AND PLAN:    1.Irritable bowel syndrome  Intermittent nausea.  Relieved by oral antacid agent, well.  Suspect related to stress.  Will evaluate further if persists.  He is comfortable that it is better.     2. Anxiety  Has been better, as stress of new job is a bit less.  He has not been taking the escitalopram regularly, I urged him to do so.  Overall, he does look better than he has in the recent past.      Follow up in three months and as needed.    Patient Instructions   1 continue to use the gas ex prn    2. Take the escitalopram 10 mg daily in the evening.     3. Follow up in three months.     CHIEF COMPLAINT:  Chief Complaint   Patient presents with     Medication Management     Discuss with MD     Anxiety     HISTORY OF PRESENT ILLNESS:  Shay Mendosa is a 60 y.o. male  Here in follow up.  He was seen outpatient for GI distress.  He is better now and uses prn antacid with 'gas ex' OTC agent.  He feels it was stress from his new work at [employer].  He was transferred from [place] when it closed.  No cough or fever, or chills. Says he stopped the escitalopram for awhile, now back on it.     REVIEW OF SYSTEMS:   See HPI, all other systems on review are negative.    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     Other irritable bowel syndrome 4/4/2019     Peripheral neuropathy      Psoriasis      Psoriatic arthritis (H)      Vitamin D deficiency      Social History     Tobacco Use   Smoking Status Never Smoker   Smokeless Tobacco Never Used     Family History   Problem Relation Age of Onset     Alcohol abuse Father      Heart disease Father      Colon polyps Sister      Past Surgical History:   Procedure Laterality Date     FOOT ARTHRODESIS, MODIFIED STERN Bilateral      VITALS:  Vitals:    01/04/21  1643   BP: 116/66   Patient Site: Left Arm   Patient Position: Sitting   Cuff Size: Adult Regular   Pulse: 66   SpO2: 99%   Weight: 157 lb (71.2 kg)     Wt Readings from Last 3 Encounters:   01/04/21 157 lb (71.2 kg)   11/05/20 166 lb (75.3 kg)   01/22/20 162 lb 7 oz (73.7 kg)     PHYSICAL EXAM:  Constitutional:  In NAD, alert and oriented  Cardiac:  S1 S2   Lungs: Clear   Psychiatric:  Mood and behavior appropriate, thinking is clear.     DECISION TO OBTAIN OLD RECORDS AND/OR OBTAIN HISTORY FROM SOMEONE OTHER THAN PATIENT, AND/OR ACCESSING CARE EVERYWHERE):  1  0     REVIEW AND SUMMARIZATION OF OLD RECORDS, AND/OR OBTAINING HISTORY FROM SOMEONE OTHER THAN PATIENT, AND/OR DISCUSSION OF CASE WITH ANOTHER HEALTH CARE PROVIDER:  2 reviewed [facility] evaluation    REVIEW AND/OR ORDER OF OF CLINICAL LAB TESTS: 1  0.    REVIEW AND/OR ORDER OF RADIOLOGY TESTS: 1 0.    REVIEW AND/OR ORDER OF MEDICAL TESTS (EKG/ECHO/COLONOSCOPY/EGD): 1  0    INDEPENDENT  VISUALIZATION OF IMAGE, TRACING, OR SPECIMEN ITSELF (2 EACH):  0     TOTAL: 2    Current Outpatient Medications   Medication Sig Dispense Refill     escitalopram oxalate (LEXAPRO) 10 MG tablet Take 1 tablet (10 mg total) by mouth daily. 30 tablet 5     loperamide (IMODIUM A-D) 2 mg tablet Take 2 tablets after first loose bowel movement; take 1 tablet after each subsequent loose stool up to 8 tablets in a 24 hour period. 24 tablet 0     simethicone (MYLICON) 80 MG chewable tablet Chew 1 tablet (80 mg total) 4 (four) times a day as needed for flatulence. 100 tablet 0     Samuel Almonte MD  Internal Medicine  [facility]

## 2021-06-14 NOTE — PATIENT INSTRUCTIONS - HE
1 continue to use the gas ex prn    2. Take the escitalopram 10 mg daily in the evening.     3. Follow up in three months.

## 2021-06-15 PROBLEM — H35.713: Chronic | Status: ACTIVE | Noted: 2017-04-20

## 2021-06-16 PROBLEM — K58.8 OTHER IRRITABLE BOWEL SYNDROME: Status: ACTIVE | Noted: 2019-04-04

## 2021-06-16 PROBLEM — H54.61 DECREASED VISION OF RIGHT EYE: Status: ACTIVE | Noted: 2018-05-21

## 2021-06-16 PROBLEM — G62.9 PERIPHERAL POLYNEUROPATHY: Status: ACTIVE | Noted: 2019-12-03

## 2021-06-16 PROBLEM — F41.9 ANXIETY: Status: ACTIVE | Noted: 2020-12-16

## 2021-06-16 PROBLEM — F79 INTELLECTUAL FUNCTIONING DISABILITY: Chronic | Status: ACTIVE | Noted: 2017-06-05

## 2021-06-16 PROBLEM — R25.2 MUSCLE CRAMPING: Status: ACTIVE | Noted: 2019-08-22

## 2021-06-16 NOTE — PROGRESS NOTES
"Shay Mendosa  1960      Assessment and Plan:  1.chronic vision issues/central serous retinopathy- needs to recheck with Retina-Dr.Dev  2. Psoriasis/arthropathy- stable he's off systemic med      Chief Complaint: eye issues    Visit diagnoses:    1. Central serous retinopathy, bilateral     2. Intellectual functioning disability     3. Psoriatic arthropathy         Meds:  Current Outpatient Prescriptions   Medication Sig Dispense Refill     amoxicillin (AMOXIL) 500 MG capsule TAKE 1 CAP BY MOUTH 3X DAILY UNTIL GONE.  0     cholecalciferol, vitamin D3, (VITAMIN D3) 2,000 unit cap Take 2 tablets by mouth daily. take one a day after done with prescription.       KRILL/OM-3/DHA/EPA/PHOSPHO/AST (MEGARED OMEGA-3 KRILL OIL ORAL) Take by mouth daily.       mometasone (ELOCON) 0.1 % ointment APPLY SPARINGLY TO AFFECTED AREA(S) ONCE DAILY. 45 g 3     sodium fluoride (CLINPRO 5000) 1.1 % Pste Apply 1 mg to teeth daily.       folic acid (FOLVITE) 1 MG tablet Take 3 tablets (3 mg total) by mouth daily. Three daily while on MTX. 270 tablet 3     No current facility-administered medications for this visit.        Allergies   Allergen Reactions     Sulfa (Sulfonamide Antibiotics)        ROS: complete review of symptoms otherwise negative except as noted below    S:  Repetitive questions/worries about eyes/vision. Not much worse has already seen retina-Dr Garcia. Off systemic psoriasis meds things stable. Low intellect/not a lot of insight .repetitive questions etc        O:   Vitals:    02/27/18 1603   BP: 138/86   Patient Site: Right Arm   Patient Position: Sitting   Cuff Size: Adult Regular   Pulse: 88   Resp: 18   SpO2: 98%   Weight: 160 lb 3 oz (72.7 kg)   Height: 5' 11\" (1.803 m)       Physical Exam: very pleasant/dull affect  General- psoriatic plaques  VS- see above  HEENT- neg             Samuel Balderas MD              "

## 2021-06-16 NOTE — PROGRESS NOTES
ASSESSMENT AND PLAN:    1. Anxiety  He feels improved with escitalopram. Doesn't feel he needs higher dose.  His appearance and manner seem significantly improved - less tense, restless, agitated. Has gained some weight. Will remain on present dose. Follow up 6 months.     2. Peripheral polyneuropathy  Feet symptoms.  No weakness, or findings.  Unclear if he truly has neuropathy or if somatization is significant.  No progressive symptoms or significant discomfort. Will follow for now, EMG if needed.     3. Voiding  Frequency is not new.  PSA has been OK, will repeat in 6 months.     Patient Instructions   1. Reassured about the foot symptoms.     2. Continue the escitalopram 10 mg po daily.     3. Follow up in 6 months, and sooner as needed.     CHIEF COMPLAINT:  Chief Complaint   Patient presents with     Prostate Check     Medication Management     Follow-up     HISTORY OF PRESENT ILLNESS:  Shay Mendosa is a 60 y.o. male in follow up.  He is feeling better, less anxiety and wants to stay on escitalopram.  The bottom of his feet feel 'funny'.  Sort of slightly numb.  No foot weakness.  Sleeping OK, no nausea or shortness of breath. Will get some anxiety at night, but a lot less panic.  Voiding OK, no incontinence, or significant hesitancy.      REVIEW OF SYSTEMS:   See HPI, all other systems on review are negative.    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     Other irritable bowel syndrome 4/4/2019     Peripheral neuropathy      Psoriasis      Psoriatic arthritis (H)      Vitamin D deficiency      Social History     Tobacco Use   Smoking Status Never Smoker   Smokeless Tobacco Never Used     Family History   Problem Relation Age of Onset     Alcohol abuse Father      Heart disease Father      Colon polyps Sister       Past Surgical History:   Procedure Laterality Date     FOOT ARTHRODESIS, MODIFIED STERN Bilateral      VITALS:  Vitals:    04/05/21 1623   BP: 110/78   Patient Site: Left Arm   Patient Position: Sitting   Cuff Size: Adult Regular   Pulse: 66   SpO2: 97%   Weight: 167 lb (75.8 kg)     Wt Readings from Last 3 Encounters:   04/05/21 167 lb (75.8 kg)   01/04/21 157 lb (71.2 kg)   11/05/20 166 lb (75.3 kg)     PHYSICAL EXAM:  Constitutional:  In NAD, alert and oriented  Extremities: no joint effusion, no significant edema  Neurologic:  Speech clear, no arm or leg weakness, gait normal, can walk on heels and toes.    Skin:  Diffuse psoriasis, overall tame  Psychiatric:  Mood and behavior appropriate, thinking is clear.     Current Outpatient Medications   Medication Sig Dispense Refill     escitalopram oxalate (LEXAPRO) 10 MG tablet Take 1 tablet (10 mg total) by mouth daily. 30 tablet 5     loperamide (IMODIUM A-D) 2 mg tablet Take 2 tablets after first loose bowel movement; take 1 tablet after each subsequent loose stool up to 8 tablets in a 24 hour period. 24 tablet 0     simethicone (MYLICON) 80 MG chewable tablet Chew 1 tablet (80 mg total) 4 (four) times a day as needed for flatulence. 100 tablet 0     Samuel Almonte MD  Internal Medicine  [facility]

## 2021-06-18 NOTE — PATIENT INSTRUCTIONS - HE
Patient Instructions by Samuel Brown PA-C at 12/22/2020 10:20 AM     Author: Samuel Brown PA-C Service: -- Author Type: Physician Assistant    Filed: 12/22/2020 10:53 AM Encounter Date: 12/22/2020 Status: Addendum    : Samuel Brown PA-C (Physician Assistant)    Related Notes: Original Note by Samuel Brown PA-C (Physician Assistant) filed at 12/22/2020 10:52 AM       1) Small amounts of clear fluids such as Sprite, apple juice frequently.   2) Limit dairy products for 5-7 days.  3) [place] diet such as bananas, rice, applesauce, toast as tolerated.  4) Use imodium and anti-gas medicine as directed.  4) Follow up promptly if signs of dehydration occur.  Follow up with primary care provider if you do not get resolution with the course of treatment.  Return to walk-in care if complication or new symptoms arise in the interim.        HOME TREATMENT OF VOMITING AND DIARRHEA    These are guidelines, not rigid rules.  If the patient is not doing well, or if you have questions, do not hesitate to call.    Vomiting     After the first vomiting episode, do not give any food or drink for 2-3 hours.  Then start frequent sips of clear liquids-the amount ranges from 1 or 2 teaspoons for children (or a few ice chips) to 2 or 4 tablespoons for adults-every 15 minutes.  Dont give any solid foods.    Clear liquid means anything you can see through.  They should not have any caffeine or carbonation.  Avoid apple juice as it sometimes worsens diarrhea.  For infants and children under two, use an electrolyte replacement like Pedialyte-either liquid or popsicle form.  For older children and adults, try water, Gatorade, Jell-O water (add double the water), or popsicles.  Some of the clear liquids should contain sugar.    When the vomiting is spaced 3 or 4 hours apart, you may increase the amount of clear liquids.  As that stays down, start nibbles of starchy foods-cheerios or saltine crackers then rice, dry toast or potatoes  (without butter).    As soon as the vomiting stops, you can add in regular foods.  We advise no fatty foods or big meals until there has been no vomiting for about 24 hours.  Then the patient can return to normal eating and drinking.    Watch for dehydration.  If a baby cries without tears, or if the patient pees less than 4 times in 24 hours, it is time to call.    Call if there is vomiting of blood, sleeping all the time, extreme weakness, severe abdominal pain, a baby or child wont smile or play at all, or if there are any other concerns.    Diarrhea     We now know that the sooner we get to a normal diet, the better.      If the patient has vomiting with diarrhea, follow the above recommendations until the vomiting has spaced out.      Then add starches and bland food like bananas, pasta, and oatmeal.  For infants, you may continue breastfeeding.  If on formula, try a soy formula for a while instead of a lactose based formula.  You may also try baby cereal mixed with water or juice other than apple.  If this is tolerated, you may add cooked fruits and vegetables and advance to regular food as soon as possible.      You may find that cows milk and apple juice worsen diarrhea, as do fatty foods so add these carefully.  You may try a soy based milk for a few days instead of regular cows milk.  Make sure the patient gets plenty of fluids too.  For children, a fluid replacement like Pedialyte in liquid form or popsicles is a good choice.     If there is no vomiting, you may continue with regular food, avoiding milk products and apple juice.  If this is not tolerated well, follow the above guidelines starting with starches.    We do not advise the use of medications to stop diarrhea in infants or children.    Watch for dehydration, as with vomiting.  Call for any symptoms listed in that section, or if there is any blood in the stools.  Call for any other concerns as well.      Patient Education   Noninfectious  Gastroenteritis (Adult)    Gastroenteritis can cause nausea, vomiting, diarrhea, and abdominal cramping. This may occur as a result of food sensitivity, inflammation of your gastrointestinal tract, medicines, stress, or other causes not related to infection. Your symptoms will usually last from 1 to 3 days, but can last longer. Antibiotics are not effective, but simple home treatment will be helpful.  Home care  Medicine    You may use acetaminophen or NSAID medicines like ibuprofen or naproxen to control fever, unless another medicine is prescribed. (Note: If you have chronic liver or kidney disease, or ever had a stomach ulcer or gastrointestinalI bleeding, talk with your healthcare provider before using these medicines.) Aspirin should never be used in anyone under 18 years of age who is ill with a fever. It may cause severe liver damage. Don't increase your NSAID medicines if you are already taking these medicines for another condition (like arthritis). Don't use NSAIDS if you are on aspirin (such as for heart disease, or after a stroke).    If medicines for diarrhea or vomiting are prescribed, take only as directed.  General care and preventing spread of the illness    If symptoms are severe, rest at home for the next 24 hours or until you feel better.    Hand washing with soap and water is the best way to prevent the spread of infection. Wash your hands after touching anyone who is sick.    Wash your hands after using the toilet and before meals. Clean the toilet after each use.    Caffeine, tobacco, and alcohol can make your diarrhea, cramping, and pain worse.  Diet    Water and clear liquids are important so you do not get dehydrated. Drink a small amount at a time.    Do not force yourself to eat, especially if you have cramps, vomiting, or diarrhea. When you finally decide to start eating, do not eat large amounts at a time, even if you are hungry.    If you eat, avoid fatty, greasy, spicy, or fried  foods.    Do not eat dairy products if you have diarrhea; they can make the diarrhea worse.  During the first 24 hours (the first full day), follow the diet below:    Beverages: Water, clear liquids, soft drinks without caffeine, like ginger ale; mineral water (plain or flavored); decaffeinated tea and coffee.    Soups: Clear broth, consommé, and bouillon Sports drinks aren't a good choice because they have too much sugar and not enough electrolytes. In this case, commercially available products called oral rehydration solutions are best.    Desserts: Plain gelatin, popsicles, and fruit juice bars.  During the next 24 hours (the second day), you may add the following to the above if you have improved. If not, continue what you did the first day:    Hot cereal, plain toast, bread, rolls, crackers    Plain noodles, rice, mashed potatoes, chicken noodle or rice soup    Unsweetened canned fruit (avoid pineapple), bananas    Limit caffeine and chocolate. No spices or seasonings except salt.  During the next 24 hours    Gradually resume a normal diet, as you feel better and your symptoms improve.    If at any time your symptoms start getting worse, go back to clear liquids until you feel better.  Food preparation    If you have diarrhea, you should not prepare food for others. When you  prepare food for yourself, wash your hands before and after.    Wash your hands after using cutting boards, countertops, and knives that have been in contact with raw food.    Keep uncooked meats away from cooked and ready-to-eat foods.  Follow-up care  Follow up with your healthcare provider if you are not improving over the next 2 to 3 days, or as advised. If a stool (diarrhea) sample was taken, call for the results as directed.  When to seek medical advice  Call your healthcare provider right away if any of these occur:     Increasing abdominal pain or constant lower right abdominal pain    Continued vomiting (unable to keep liquids  down)    Frequent diarrhea (more than 5 times a day)    Blood in vomit or stool (black or red color)    Inability to tolerate solid food after a few days.    Dark urine, reduced urine output    Weakness, dizziness    Drowsiness    Fever of 100.4 F (38.0 C) or higher, or as directed by your healthcare provider    New rash  Call 911  Call 911 if any of these occur:    Trouble breathing    Chest pain    Confusion    Severe drowsiness or trouble awakening    Seizure    Stiff neck  Date Last Reviewed: 11/16/2015 2000-2017 [employer]. [address] [place] [address]. All rights reserved. This information is not intended as a substitute for professional medical care. Always follow your healthcare professional's instructions.         Patient Education   Diet for Vomiting or Diarrhea (Adult)    Your symptoms may return or get worse after eating certain foods listed below. If this happens, stop eating these foods until your symptoms ease and you feel better.  Once the vomiting stops, follow the steps below.   During the first 12 to 24 hours  During the first 12 to 24 hours, follow this diet:    Drinks. Plain water, sport drinks like electrolyte solutions, soft drinks without caffeine, mineral water (plain or flavored), clear fruit juices, and decaffeinated tea and coffee.    Soups. Clear broth.    Desserts. Plain gelatin, popsicles, and fruit juice bars. As you feel better, you may add 6 to 8 ounces of yogurt per day. If you have diarrhea, don't have foods or drinks that contain sugar, high-fructose corn syrup, or sugar alcohols.  During the next 24 hours  During the next 24 hours you may add the following to the above:    Hot cereal, plain toast, bread, rolls, and crackers    Plain noodles, rice, mashed potatoes, and chicken noodle or rice soup    Unsweetened canned fruit (but not pineapple) and bananas  Don't eat more than 15 grams of fat a day. Do this by staying away from margarine, butter,  oils, mayonnaise, sauces, gravies, fried foods, peanut butter, meat, poultry, and fish.  Don't eat much fiber. Stay away from raw or cooked vegetables, fresh fruits (except bananas), and bran cereals.  Limit how much caffeine and chocolate you have. Do not use any spices or seasonings except salt.  During the next 24 hours  Slowly go back to your normal diet, as you feel better and your symptoms ease.  Date Last Reviewed: 8/1/2016 2000-2017 [employer]. [address] [place] [address]. All rights reserved. This information is not intended as a substitute for professional medical care. Always follow your healthcare professional's instructions.

## 2021-06-18 NOTE — PROGRESS NOTES
Shay Mendosa  1960      Assessment and Plan:  1 prominent left AC joint with bone callus reassured  2 reassurance he struggled from some anxiety and worry about health matters return to clinic about 4 months for general recheck    Chief Complaint: Lump on collarbone  Visit diagnoses:    1. Bone callus     2. Decreased vision of right eye         Meds:  No current outpatient prescriptions on file.     No current facility-administered medications for this visit.        Allergies   Allergen Reactions     Sulfa (Sulfonamide Antibiotics)        ROS: complete review of symptoms otherwise negative except as noted below    S: Aubrey comes in today because of a prominent callus on his clavicle he thinks he may have injured it years ago he is not sure.  He has very evident intellectual compromise and casual conversation with him.  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  Not a lot of insight and health matters in general.  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  He has chronic psoriasis but is not interested in going on any medications other than topical creams       O:   Vitals:    05/21/18 1133   BP: 128/74   Patient Site: Right Arm   Patient Position: Sitting   Cuff Size: Adult Regular   Pulse: 66   Weight: 163 lb 8 oz (74.2 kg)       Physical Exam:  General-very pleasant man but odd almost silly type of affect very clearly has some cognitive function issues developmental  VS- see above    Musculoskeletal-prominence AC joint with clavicular callus no serious pathology  Skin multiple areas of psoriatic eruption which does not seem to bother him            Samuel Balderas MD

## 2021-06-18 NOTE — LETTER
Letter by Samuel Balderas MD at      Author: Samuel Balderas MD Service: -- Author Type: --    Filed:  Encounter Date: 1/29/2019 Status: (Other)       Shay Mendosa  [address]  [address]  [facility] [address]             January 29, 2019         Dear Mr. Mendosa,    Below are the results from your recent visit:    Resulted Orders   Comprehensive Metabolic Panel   Result Value Ref Range    Sodium 143 136 - 145 mmol/L    Potassium 4.3 3.5 - 5.0 mmol/L    Chloride 104 98 - 107 mmol/L    CO2 29 22 - 31 mmol/L    Anion Gap, Calculation 10 5 - 18 mmol/L    Glucose 62 (L) 70 - 125 mg/dL    BUN 25 (H) 8 - 22 mg/dL    Creatinine 0.81 0.70 - 1.30 mg/dL    GFR MDRD Af Amer >60 >60 mL/min/1.73m2    GFR MDRD Non Af Amer >60 >60 mL/min/1.73m2    Bilirubin, Total 0.6 0.0 - 1.0 mg/dL    Calcium 9.4 8.5 - 10.5 mg/dL    Protein, Total 7.3 6.0 - 8.0 g/dL    Albumin 4.0 3.5 - 5.0 g/dL    Alkaline Phosphatase 71 45 - 120 U/L    AST 20 0 - 40 U/L    ALT 12 0 - 45 U/L    Narrative    Fasting Glucose reference range is 70-99 mg/dL per  American Diabetes Association (ADA) guidelines.   HM2(CBC w/o Differential)   Result Value Ref Range    WBC 5.9 4.0 - 11.0 thou/uL    RBC 5.01 4.40 - 6.20 mill/uL    Hemoglobin 14.3 14.0 - 18.0 g/dL    Hematocrit 42.4 40.0 - 54.0 %    MCV 85 80 - 100 fL    MCH 28.6 27.0 - 34.0 pg    MCHC 33.8 32.0 - 36.0 g/dL    RDW 12.2 11.0 - 14.5 %    Platelets 290 140 - 440 thou/uL    MPV 7.2 7.0 - 10.0 fL   Erythrocyte Sedimentation Rate   Result Value Ref Range    Sed Rate 9 0 - 15 mm/hr   C-Reactive Protein   Result Value Ref Range    CRP 0.2 0.0 - 0.8 mg/dL   Urinalysis-UC if Indicated   Result Value Ref Range    Color, UA Yellow Colorless, Yellow, Straw, Light Yellow    Clarity, UA Clear Clear    Glucose, UA Negative Negative    Bilirubin, UA Negative Negative    Ketones, UA Negative Negative    Specific Gravity, UA >=1.030 1.005 - 1.030    Blood, UA Trace (!) Negative    pH, UA 6.0 5.0 - 8.0     Protein, UA Negative Negative mg/dL    Urobilinogen, UA 0.2 E.U./dL 0.2 E.U./dL, 1.0 E.U./dL    Nitrite, UA Negative Negative    Leukocytes, UA Negative Negative    Bacteria, UA Few (!) None Seen hpf    RBC, UA 0-2 None Seen, 0-2 hpf    WBC, UA None Seen None Seen, 0-5 hpf    Squam Epithel, UA None Seen None Seen, 0-5 lpf    Narrative    UC not indicated   PSA (Prostatic-Specific Antigen), Annual Screen   Result Value Ref Range    PSA 1.7 0.0 - 3.5 ng/mL    Narrative    Method is Abbott Prostate-Specific Antigen (PSA)  Standard-WHO 1st International (90:10)       Aubrey, no surprises here. Same plan and I suggest you follow up with Dr. Samuel Almonte in 6 months or so.     Please call with questions or contact us using [employer].    Sincerely,        Electronically signed by Samuel Balderas MD

## 2021-06-19 NOTE — LETTER
Letter by Greg Cosme DO at      Author: Greg Cosme DO Service: -- Author Type: --    Filed:  Encounter Date: 7/22/2019 Status: (Other)         Shay Mendosa  [address]  [address]  [facility] [address]             July 22, 2019         Dear Mr. Mendosa,    Below are the results from your recent visit:    Resulted Orders   Basic Metabolic Panel   Result Value Ref Range    Sodium 137 136 - 145 mmol/L    Potassium 4.1 3.5 - 5.0 mmol/L    Chloride 102 98 - 107 mmol/L    CO2 23 22 - 31 mmol/L    Anion Gap, Calculation 12 5 - 18 mmol/L    Glucose 122 70 - 125 mg/dL    Calcium 9.6 8.5 - 10.5 mg/dL    BUN 26 (H) 8 - 22 mg/dL    Creatinine 0.86 0.70 - 1.30 mg/dL    GFR MDRD Af Amer >60 >60 mL/min/1.73m2    GFR MDRD Non Af Amer >60 >60 mL/min/1.73m2    Narrative    Fasting Glucose reference range is 70-99 mg/dL per  American Diabetes Association (ADA) guidelines.   ALT (SGPT)   Result Value Ref Range    ALT 14 0 - 45 U/L   AST (SGOT)   Result Value Ref Range    AST 21 0 - 40 U/L   Albumin   Result Value Ref Range    Albumin 4.0 3.5 - 5.0 g/dL   Erythrocyte Sedimentation Rate   Result Value Ref Range    Sed Rate 8 0 - 15 mm/hr   C-Reactive Protein   Result Value Ref Range    CRP 0.1 0.0 - 0.8 mg/dL   HLA-B27 Typing   Result Value Ref Range    B27 Test Method SSOP     B locus B27 Neg       Comment:      Performed and/or entered by:  [address] [address]            Lab results were stable/unremarkable.           Please call with questions or contact us using [employer].    Sincerely,        Electronically signed by Greg Cosme DO

## 2021-06-19 NOTE — LETTER
Letter by Greg Cosme DO at      Author: Greg Cosme DO Service: -- Author Type: --    Filed:  Encounter Date: 10/14/2019 Status: Signed         Shay Mendosa  [address]  [address]  [facility] [address]             October 14, 2019         Dear Mr. Mendosa,    Below are the results from your recent visit:    Resulted Orders   Creatinine   Result Value Ref Range    Creatinine 0.79 0.70 - 1.30 mg/dL    GFR MDRD Af Amer >60 >60 mL/min/1.73m2    GFR MDRD Non Af Amer >60 >60 mL/min/1.73m2   ALT (SGPT)   Result Value Ref Range    ALT 16 0 - 45 U/L   AST (SGOT)   Result Value Ref Range    AST 22 0 - 40 U/L   Albumin   Result Value Ref Range    Albumin 3.9 3.5 - 5.0 g/dL      Normal creatinine/kidney function and liver enzymes.     Please call with questions or contact us using [employer].    Sincerely,        Electronically signed by Greg Cosme DO

## 2021-06-20 NOTE — LETTER
Letter by Greg Cosme DO at      Author: Greg Cosme DO Service: -- Author Type: --    Filed:  Encounter Date: 4/1/2020 Status: (Other)         Shay Mendosa  [address]  [address]  [facility] [address]             April 1, 2020         Dear Mr. Mendosa,    Below are the results from your recent visit:    Resulted Orders   Creatinine   Result Value Ref Range    Creatinine 0.70 0.70 - 1.30 mg/dL    GFR MDRD Af Amer >60 >60 mL/min/1.73m2    GFR MDRD Non Af Amer >60 >60 mL/min/1.73m2   ALT (SGPT)   Result Value Ref Range    ALT 13 0 - 45 U/L   AST (SGOT)   Result Value Ref Range    AST 19 0 - 40 U/L   Albumin   Result Value Ref Range    Albumin 3.8 3.5 - 5.0 g/dL      Normal creatinine/kidney function and liver enzymes.     Please call with questions or contact us using [employer].    Sincerely,        Electronically signed by Greg Cosme DO

## 2021-06-20 NOTE — LETTER
Letter by Brijesh Ontiveros DO at      Author: Brijesh Ontiveros DO Service: -- Author Type: --    Filed:  Encounter Date: 12/10/2019 Status: Signed         Shay Mendosa  [address]  [address]  [facility] [address]             December 10, 2019         Dear Mr. Mendosa,    Below are the results from your recent visit:    Resulted Orders   Vitamin B12   Result Value Ref Range    Vitamin B-12 400 213 - 816 pg/mL   Glycosylated Hemoglobin A1c   Result Value Ref Range    Hemoglobin A1c 5.7 3.5 - 6.0 %   Lyme Antibody Cascade   Result Value Ref Range    Lyme Antibody Cascade 0.07 <0.90 Index Value    Narrative    Interpretation of Lyme Disease Total Antibody (IgG/IgM)  <0.90 Test Value=Negative  No detectable antibodies to B. burgdorferi. Patients  in early stages of infection may not produce  detectable levels of antibody. Antibiotic therapy  in early disease may prevent antibody production  from reaching detectable levels. Patients with  clinical history and/or symptoms suggestive of Lyme  disease but with negative test results should be  retested in 2-4 weeks.  0.90-<1.10 Test Value=Borderline  Suggests the presence of antibodies to B.  burgdorferi. Recommend repeat collection in 2-4  weeks.  >=1.10 Test Value=Positive  Indicates the presence of antibodies to B.  burgdorferi. False positive results can occur with  sera from syphilis patients. Cross-reactivity may  occur with relapsing fever, [place] Spotted  fever, other spirochetal diseases, erythematosus,  EBV infection, or CMV infection. Clinical symptoms,  epidemiology of the case and other laboratory tests  should allow for distinction of these conditions from  Lyme disease.   Thyroid Stimulating Hormone (TSH)   Result Value Ref Range    TSH 2.99 0.30 - 5.00 uIU/mL   T4, Free   Result Value Ref Range    Free T4 0.9 0.7 - 1.8 ng/dL   Electrophoresis, Protein, Serum   Result Value Ref Range    Albumin % 54.2 51.0 - 67.0 %    Albumin  4.0 3.2 - 4.7  g/dL    Alpha 1 % 3.1 2.0 - 4.0 %    Alpha 1 0.2 0.1 - 0.3 g/dL    Alpha 2 % 13.8 (H) 5.0 - 13.0 %    Alpha 2 1.0 (H) 0.4 - 0.9 g/dL    % Beta 12.8 10.0 - 17.0 %    Beta 0.9 0.7 - 1.2 g/dL    Gamma Globulin % 16.1 9.0 - 20.0 %    Gamma Globulin 1.2 0.6 - 1.4 g/dL    ELP Comment       Mild nonspecific increase in alpha 2 globulin fraction, possibly related to chronic inflammation.     Protein, Total 7.3 6.0 - 8.0 g/dL    Path ICD: G62.9     Interpreted By: Richardson Barbosa MD    Magnesium   Result Value Ref Range    Magnesium 1.9 1.8 - 2.6 mg/dL       It was nice to meet you the other day in the.  The work-up for neuropathy (the feeling of walking on bunched up socks) was negative, meaning that I could not find any particular cause.  I would like to see what the nerve conduction test shows.  I will wait to see what the results of that are.  Sometimes, there is not a clear cause of neuropathy, which we usually call idiopathic peripheral neuropathy.  I am happy to answer questions about this, let me know.    Please call with questions or contact us using [employer].    Sincerely,        Electronically signed by Brijesh Ontiveros,

## 2021-06-20 NOTE — PROGRESS NOTES
Shay Mendosa  1960    Assessment and Plan:  1 blood pressure check pressure looks excellent today is elevated when he was at the retinologist office looks fine today  2 chronic serous retinopathy chronic vision problems stable  3. Psoriasis reasonable control with home remedies- epson salts  4routine labs today/return general check 4-6 months; continue to follow with retina specialist    Chief Complaint: Blood pressure check    Visit diagnoses:    1. Blood pressure check     2. Hyperlipemia  Comprehensive Metabolic Panel    HM2(CBC w/o Differential)   3. Decreased vision of right eye     4. Central serous retinopathy, bilateral     5. Psoriatic arthropathy (H)     6. Anxiety         Meds:  No current outpatient prescriptions on file.     No current facility-administered medications for this visit.        Allergies   Allergen Reactions     Sulfa (Sulfonamide Antibiotics)        ROS: complete review of symptoms otherwise negative except as noted below    S:  Vague affect as always/ wants blood pressure checked/ elevated x1 at retina office. Chronic eye issues sometimes headaches with vision problems sounding like tension HA. Worries about things. Son with home he lives is fine. Patient still works in housekeeping at [employer]       O:   Vitals:    10/02/18 1622   BP: 112/66   Patient Site: Left Arm   Patient Position: Sitting   Cuff Size: Adult Regular   Pulse: 62   SpO2: 98%   Weight: 162 lb 12.8 oz (73.8 kg)       Physical Exam:  General-very pleasant affect somewhat vague and difficult to have a casual conversation with his main concern his blood pressure is excellent today  VS- see above  HEENT- neg   Neck- no adenopathy/thyromegaly/bruits  Chest- clear   Cor- reg no murmurs/gallops/ectopics  Extremities: no edema, good distal pulses  Neuro- Cr. NN-  intact, alert,   Abdomen- soft non tender, no masses; no organomegaly  Skin-psoriasis but much less involved and I recall on previous visits  Lymph- no  pathologic nodes in neck/axilla/groin  Musculoskeletal-joint survey negative        No results found for this or any previous visit (from the past 240 hour(s)).      Samuel Balderas MD

## 2021-06-23 NOTE — PROGRESS NOTES
Shay Mendosa  1960      Assessment and Plan:  1. Psoriasis- comfortable with his topical Rx; not interested in MTX; joints stable  2. Central serous retinopathy- stable only affects one eye (right)- goes to retinologist  3. Routine labs today      Chief Complaint: f/u psoriasis/vision problems    Visit diagnoses:    1. Psoriasis  Comprehensive Metabolic Panel    HM2(CBC w/o Differential)    Erythrocyte Sedimentation Rate    C-Reactive Protein    Urinalysis-UC if Indicated   2. Psoriatic arthropathy (H)     3. Anxiety     4. 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     Prostatitis      Psoriasis      Vitamin D deficiency      Past Surgical History:   Procedure Laterality Date     FOOT ARTHRODESIS, MODIFIED STERN Bilateral      Social History     Socioeconomic History     Marital status:      Spouse name: Not on file     Number of children: 1     Years of education: 13     Highest education level: Not on file   Social Needs     Financial resource strain: Not on file     Food insecurity - worry: Not on file     Food insecurity - inability: Not on file     Transportation needs - medical: Not on file     Transportation needs - non-medical: Not on file   Occupational History     Occupation: housekeeping     Employer: [employer]     Comment: [employer]   Tobacco Use     Smoking status: Never Smoker     Smokeless tobacco: Never Used   Substance and Sexual Activity     Alcohol use: No     Drug use: Not on file     Sexual activity:  Not on file   Other Topics Concern     Not on file   Social History Narrative    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 Non smoker/non drinker/ lives with 20+ yr old son who has autism variant. Patient enjoys bowling. [employer] high school and one year of trade school      Family History   Problem Relation Age of Onset     Alcohol abuse Father      Heart disease Father          Meds:  No current outpatient medications on file.     No current facility-administered medications for this visit.        Allergies   Allergen Reactions     Sulfa (Sulfonamide Antibiotics)        ROS: complete review of symptoms otherwise negative except as noted below    S:  Doing well. 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 Mentally a bit dull. No new issues/ psoriasis skin and joints ok from his perspective. Does not want MTX       O:   Vitals:    01/28/19 0941   BP: 114/76   Patient Site: Left Arm   Patient Position: Sitting   Cuff Size: Adult Regular   Pulse: 62   SpO2: 98%   Weight: 162 lb 5 oz (73.6 kg)   Height: 6' (1.829 m)       Physical Exam:  General- pleasant dull affect  VS- see above  HEENT- neg   Neck- no adenopathy/thyromegaly/bruits  Chest- clear   Cor- reg no murmurs/gallops/ectopics  Extremities: no edema, good distal pulses  Neuro- Cr. NN-  intact, alert,   Abdomen- soft non tender, no masses; no organomegaly  Skin- no suspicious lesions for malignancy; dull red psoriatic plaques no infection  Lymph- no pathologic nodes in neck/axilla/groin  Musculoskeletal- no active synovitis   -          Samuel Balderas MD      Recent Results (from the past 240 hour(s))   Comprehensive Metabolic Panel   Result Value Ref Range    Sodium 143 136 - 145 mmol/L    Potassium 4.3 3.5 - 5.0 mmol/L    Chloride 104 98 - 107 mmol/L    CO2 29 22 - 31 mmol/L    Anion Gap, Calculation 10 5 - 18 mmol/L    Glucose 62 (L) 70 - 125 mg/dL    BUN 25 (H) 8 - 22 mg/dL    Creatinine 0.81 0.70 - 1.30 mg/dL    GFR MDRD Af  Amer >60 >60 mL/min/1.73m2    GFR MDRD Non Af Amer >60 >60 mL/min/1.73m2    Bilirubin, Total 0.6 0.0 - 1.0 mg/dL    Calcium 9.4 8.5 - 10.5 mg/dL    Protein, Total 7.3 6.0 - 8.0 g/dL    Albumin 4.0 3.5 - 5.0 g/dL    Alkaline Phosphatase 71 45 - 120 U/L    AST 20 0 - 40 U/L    ALT 12 0 - 45 U/L   HM2(CBC w/o Differential)   Result Value Ref Range    WBC 5.9 4.0 - 11.0 thou/uL    RBC 5.01 4.40 - 6.20 mill/uL    Hemoglobin 14.3 14.0 - 18.0 g/dL    Hematocrit 42.4 40.0 - 54.0 %    MCV 85 80 - 100 fL    MCH 28.6 27.0 - 34.0 pg    MCHC 33.8 32.0 - 36.0 g/dL    RDW 12.2 11.0 - 14.5 %    Platelets 290 140 - 440 thou/uL    MPV 7.2 7.0 - 10.0 fL   Erythrocyte Sedimentation Rate   Result Value Ref Range    Sed Rate 9 0 - 15 mm/hr   C-Reactive Protein   Result Value Ref Range    CRP 0.2 0.0 - 0.8 mg/dL   Urinalysis-UC if Indicated   Result Value Ref Range    Color, UA Yellow Colorless, Yellow, Straw, Light Yellow    Clarity, UA Clear Clear    Glucose, UA Negative Negative    Bilirubin, UA Negative Negative    Ketones, UA Negative Negative    Specific Gravity, UA >=1.030 1.005 - 1.030    Blood, UA Trace (!) Negative    pH, UA 6.0 5.0 - 8.0    Protein, UA Negative Negative mg/dL    Urobilinogen, UA 0.2 E.U./dL 0.2 E.U./dL, 1.0 E.U./dL    Nitrite, UA Negative Negative    Leukocytes, UA Negative Negative    Bacteria, UA Few (!) None Seen hpf    RBC, UA 0-2 None Seen, 0-2 hpf    WBC, UA None Seen None Seen, 0-5 hpf    Squam Epithel, UA None Seen None Seen, 0-5 lpf   PSA (Prostatic-Specific Antigen), Annual Screen   Result Value Ref Range    PSA 1.7 0.0 - 3.5 ng/mL

## 2021-06-24 NOTE — PATIENT INSTRUCTIONS - HE
1. Follow the arthritis for now.  If it progresses he may want to consider a second opinion with rheumatology for psoriatic arthritis for a treatment alternative to MTX which he feels caused too much side effects.     2.  Discussed headaches and role of caffeine, and stress.      3. He is encouraged to follow the treatment recommendations from the eye physician.      4. Start spironolactone 25 mg po two times a day per the eye physician    5. Follow up with me in one month for blood testing.

## 2021-06-24 NOTE — PROGRESS NOTES
ASSESSMENT AND PLAN:    1. Need for Td vaccine  Given  - Td, Preservative Free (green label)    2. Central serous retinopathy, bilateral  I recommended that he start spironolactone 25 mg po two times a day as recommended by his retinal specialist ophthalmologist.  I strongly emphasized that side effects would likely be minimal.  He will follow up in one month.     3. Psoriatic arthropathy (H)  Ongoing had symptoms.  He has stopped MTX due to side effects and worry about it influencing his immune system.  I emphasized that his arthritis will worsen.  He could have second opinion and possibly try enbrel or other TNF inhibitor.  He is not interested at this time.      4. Psoriasis  He treats his elbow rash with topical agents and is satisfied with control.     Patient Instructions   1. Follow the arthritis for now.  If it progresses he may want to consider a second opinion with rheumatology for psoriatic arthritis for a treatment alternative to MTX which he feels caused too much side effects.     2.  Discussed headaches and role of caffeine, and stress.      3. He is encouraged to follow the treatment recommendations from the eye physician.      4. Start spironolactone 25 mg po two times a day per the eye physician    5. Follow up with me in one month for blood testing.     CHIEF COMPLAINT:  Chief Complaint   Patient presents with     Establish Care     Immunizations     needs td      Eye Problem     blurry vision- x 1 year     Headache     when stressed     HISTORY OF PRESENT ILLNESS:  Shay Mendosa is a 58 y.o. male with follow up.  He retinal specialist recommended that he use spironolactone for his eye condition.  He has been reluctant due to concern about side effects.  He stopped MTX and does have symptomatic hand synovitis.  His psoriasis rash is under control.  No recent illness.  He continues to work.      REVIEW OF SYSTEMS:   See HPI, all other systems on review are negative.    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     Psoriasis      Psoriatic arthritis (H)      Vitamin D deficiency      Social History     Socioeconomic History     Marital status:      Spouse name: Not on file     Number of children: 1     Years of education: 13     Highest education level: Not on file   Occupational History     Occupation: housekeeping     Employer: [employer]     Comment: [employer]   Social Needs     Financial resource strain: Not on file     Food insecurity:     Worry: Not on file     Inability: Not on file     Transportation needs:     Medical: Not on file     Non-medical: Not on file   Tobacco Use     Smoking status: Never Smoker     Smokeless tobacco: Never Used   Substance and Sexual Activity     Alcohol use: No     Drug use: Not on file     Sexual activity: Not on file   Lifestyle     Physical activity:     Days per week: Not on file     Minutes per session: Not on file     Stress: Not on file   Relationships     Social connections:     Talks on phone: Not on file     Gets together: Not on file     Attends Islam service: Not on file     Active member of club or organization: Not on file     Attends meetings of clubs or organizations: Not on file     Relationship status: Not on file     Intimate partner violence:     Fear of current or ex partner: Not on file     Emotionally abused: Not on file     Physically abused: Not on file     Forced sexual activity: Not on file   Other Topics Concern     Not on file   Social History Narrative    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 Non smoker/non drinker/ lives with 20+ yr old son who has autism variant. Patient enjoys bowling. [employer] high school and one year of trade school      Family History   Problem Relation Age of Onset     Alcohol abuse  Father      Heart disease Father      Past Surgical History:   Procedure Laterality Date     FOOT ARTHRODESIS, MODIFIED STERN Bilateral      VITALS:  Vitals:    03/07/19 0914   BP: 120/80   Patient Site: Left Arm   Patient Position: Sitting   Cuff Size: Adult Regular   Pulse: 62   SpO2: 97%   Weight: 158 lb 6.4 oz (71.8 kg)     Wt Readings from Last 3 Encounters:   03/07/19 158 lb 6.4 oz (71.8 kg)   01/28/19 162 lb 5 oz (73.6 kg)   10/02/18 162 lb 12.8 oz (73.8 kg)     PHYSICAL EXAM:  Constitutional:  In NAD, alert and oriented  Neck:  no significant adenopathy.  Cardiac:  S1 S2 without murmur  Lungs: Clear to auscultation  Abdomen:   Soft, flat and non-tender, without guarding, rebound, or mass.    Extremities: bilateral MCP and PIP joint synovitis,   Skin:  Bilateral elbow rashes    DECISION TO OBTAIN OLD RECORDS AND/OR OBTAIN HISTORY FROM SOMEONE OTHER THAN PATIENT, AND/OR ACCESSING CARE EVERYWHERE):  1  0     REVIEW AND SUMMARIZATION OF OLD RECORDS, AND/OR OBTAINING HISTORY FROM SOMEONE OTHER THAN PATIENT, AND/OR DISCUSSION OF CASE WITH ANOTHER HEALTH CARE PROVIDER:  2 I reviewed the notes from Retinal surgery about treatment recommendations.     REVIEW AND/OR ORDER OF OF CLINICAL LAB TESTS: 1  0.    REVIEW AND/OR ORDER OF RADIOLOGY TESTS: 1 0.    REVIEW AND/OR ORDER OF MEDICAL TESTS (EKG/ECHO/COLONOSCOPY/EGD): 1  0    INDEPENDENT  VISUALIZATION OF IMAGE, TRACING, OR SPECIMEN ITSELF (2 EACH):  0     TOTAL: 2    Current Outpatient Medications   Medication Sig Dispense Refill     spironolactone (ALDACTONE) 25 MG tablet Take 1 tablet (25 mg total) by mouth 2 (two) times a day. 60 tablet 11     No current facility-administered medications for this visit.      Samuel Almonte MD  Internal Medicine  [facility]

## 2021-06-24 NOTE — TELEPHONE ENCOUNTER
He just started the medication, so he can't know that it is not working.  He should continue it, and then see his eye physician for further advice about whether it is working.  Dr. Gage MD

## 2021-06-24 NOTE — TELEPHONE ENCOUNTER
Medication Question or Clarification  Who is calling: Patient  What medication are you calling about? (include dose and sig) spironolactone (ALDACTONE) 25 MG tablet - Take 1 tablet (25 mg total) by mouth 2 (two) times a day. - Oral  Who prescribed the medication?: Samuel Almonte MD   What is your question/concern?: Patient stated that this medication is not working and his eye doctor told him to contact his primary.  Pharmacy: [facility]  Okay to leave a detailed message?: Yes, [phone]  Site CMT - Please call the pharmacy to obtain any additional needed information.

## 2021-06-30 NOTE — PROGRESS NOTES
Progress Notes by Samuel Brown PA-C at 12/22/2020 10:20 AM     Author: Samuel Brown PA-C Service: -- Author Type: Physician Assistant    Filed: 12/22/2020  1:27 PM Encounter Date: 12/22/2020 Status: Signed    : Samuel Brown PA-C (Physician Assistant)       Chief Complaint   Patient presents with   ? Stomach gurgling     x1-1.5month, stomach feels irritated x a few weeks   ? Diarrhea     x2d       HPI:    Shay Mendosa is a 60 y.o. male who has a history of irritable bowel syndrome, diarrhea predominant who presents today complaining of stomach upset and passing gas.  Patient states that he has had diarrhea for 2 days.  He does not state that there has been blood or mucus in the stools.  He only has 1-2 loose watery stools per day for the last 1 to 2 days.  Otherwise he states that he has had soft and formed stools.  He has not had nausea vomiting obstipation fever chills night sweats or fatigue or overt abdominal pain.  He is continuing to do his daily activities and have regular urination.     History obtained from the patient.    Problem List:  2020-12: Anxiety  2020-01: Breast tenderness in male  2019-12: Peripheral polyneuropathy  2019-08: Muscle cramping  2019-04: Other irritable bowel syndrome  2018-05: Bone callus  2018-05: Decreased vision of right eye  2017-06: Intellectual functioning disability  2017-04: Central serous retinopathy, bilateral  2016-12: Polyarthralgia  2016-12: High risk medication use  2016-06: Arthralgia  Prostatitis  Anxiety  Myalgia And Myositis  Hyperlipidemia LDL goal <130  Psoriasis  Vitamin D deficiency  Psoriatic arthropathy (H)  Foot Pain (Soft Tissue)  Feet Hallux Limitus Bilaterally  Strain      Past Medical History:   Diagnosis Date   ? Anxiety     chronic stress/intellectually disabled son   ? Autism spectrum disorder    ? Central serous retinopathy    ? 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   ? Other irritable bowel syndrome  4/4/2019   ? Peripheral neuropathy    ? Psoriasis    ? Psoriatic arthritis (H)    ? Vitamin D deficiency        Social History     Tobacco Use   ? Smoking status: Never Smoker   ? Smokeless tobacco: Never Used   Substance Use Topics   ? Alcohol use: No       Review of Systems  As above in HPI, otherwise balance of Review of Systems are negative. No treatment for this over-the-counter at home.    Vitals:    12/22/20 1026   BP: 129/74   Patient Site: Right Arm   Patient Position: Sitting   Cuff Size: Adult Regular   Pulse: 86   Resp: 16   Temp: 98  F (36.7  C)   TempSrc: Oral   SpO2: 96%       Physical Exam    General: Patient is resting comfortably no acute distress is afebrile  HEENT: Head is normocephalic atraumatic   eyes are PERRL EOMI sclera anicteric   TMs are clear bilaterally  Throat is with mild pharyngeal wall erythema and no exudate  No cervical lymphadenopathy present  LUNGS: Clear to auscultation bilaterally  HEART: Regular rate and rhythm  Abdomen: Increased to hyperactive bowel sounds in all 4 quadrants of the abdomen no distention no tenderness no rebound or guarding no masses no CVA tenderness to percussion.  No pain at McBurney's point.  Skin: Without rash non-diaphoretic.  Capillary refill is brisk at less than 2 seconds.  Skin is with good turgor    Clinical Decision Making:  I had a conversation with the patient stating that I do think he has some potential for diarrhea.  He will be treated with Imodium and simethicone for symptomatic care.  Expected course of resolution and indication for return were also reviewed.  We reviewed dietary treatment as outlined in the handouts below.  Patient will follow up with his primary care provider if not getting under percent resolution or if new symptoms or concerns arise.  Questions were answered to his satisfaction before discharge.    At the end of the encounter, I discussed results, diagnosis, medications. Discussed red flags for immediate return to  clinic/ER, as well as indications for follow up if no improvement. Patient understood and agreed to plan. Patient was stable for discharge.    1. Diarrhea, unspecified type  loperamide (IMODIUM A-D) 2 mg tablet    simethicone (MYLICON) 80 MG chewable tablet         Patient Instructions   1) Small amounts of clear fluids such as Sprite, apple juice frequently.   2) Limit dairy products for 5-7 days.  3) [place] diet such as bananas, rice, applesauce, toast as tolerated.  4) Use imodium and anti-gas medicine as directed.  4) Follow up promptly if signs of dehydration occur.  Follow up with primary care provider if you do not get resolution with the course of treatment.  Return to walk-in care if complication or new symptoms arise in the interim.        HOME TREATMENT OF VOMITING AND DIARRHEA    These are guidelines, not rigid rules.  If the patient is not doing well, or if you have questions, do not hesitate to call.    Vomiting     After the first vomiting episode, do not give any food or drink for 2-3 hours.  Then start frequent sips of clear liquids-the amount ranges from 1 or 2 teaspoons for children (or a few ice chips) to 2 or 4 tablespoons for adults-every 15 minutes.  Dont give any solid foods.    Clear liquid means anything you can see through.  They should not have any caffeine or carbonation.  Avoid apple juice as it sometimes worsens diarrhea.  For infants and children under two, use an electrolyte replacement like Pedialyte-either liquid or popsicle form.  For older children and adults, try water, Gatorade, Jell-O water (add double the water), or popsicles.  Some of the clear liquids should contain sugar.    When the vomiting is spaced 3 or 4 hours apart, you may increase the amount of clear liquids.  As that stays down, start nibbles of starchy foods-cheerios or saltine crackers then rice, dry toast or potatoes (without butter).    As soon as the vomiting stops, you can add in regular foods.  We advise no  fatty foods or big meals until there has been no vomiting for about 24 hours.  Then the patient can return to normal eating and drinking.    Watch for dehydration.  If a baby cries without tears, or if the patient pees less than 4 times in 24 hours, it is time to call.    Call if there is vomiting of blood, sleeping all the time, extreme weakness, severe abdominal pain, a baby or child wont smile or play at all, or if there are any other concerns.    Diarrhea     We now know that the sooner we get to a normal diet, the better.      If the patient has vomiting with diarrhea, follow the above recommendations until the vomiting has spaced out.      Then add starches and bland food like bananas, pasta, and oatmeal.  For infants, you may continue breastfeeding.  If on formula, try a soy formula for a while instead of a lactose based formula.  You may also try baby cereal mixed with water or juice other than apple.  If this is tolerated, you may add cooked fruits and vegetables and advance to regular food as soon as possible.      You may find that cows milk and apple juice worsen diarrhea, as do fatty foods so add these carefully.  You may try a soy based milk for a few days instead of regular cows milk.  Make sure the patient gets plenty of fluids too.  For children, a fluid replacement like Pedialyte in liquid form or popsicles is a good choice.     If there is no vomiting, you may continue with regular food, avoiding milk products and apple juice.  If this is not tolerated well, follow the above guidelines starting with starches.    We do not advise the use of medications to stop diarrhea in infants or children.    Watch for dehydration, as with vomiting.  Call for any symptoms listed in that section, or if there is any blood in the stools.  Call for any other concerns as well.      Patient Education   Noninfectious Gastroenteritis (Adult)    Gastroenteritis can cause nausea, vomiting, diarrhea, and abdominal  cramping. This may occur as a result of food sensitivity, inflammation of your gastrointestinal tract, medicines, stress, or other causes not related to infection. Your symptoms will usually last from 1 to 3 days, but can last longer. Antibiotics are not effective, but simple home treatment will be helpful.  Home care  Medicine    You may use acetaminophen or NSAID medicines like ibuprofen or naproxen to control fever, unless another medicine is prescribed. (Note: If you have chronic liver or kidney disease, or ever had a stomach ulcer or gastrointestinalI bleeding, talk with your healthcare provider before using these medicines.) Aspirin should never be used in anyone under 18 years of age who is ill with a fever. It may cause severe liver damage. Don't increase your NSAID medicines if you are already taking these medicines for another condition (like arthritis). Don't use NSAIDS if you are on aspirin (such as for heart disease, or after a stroke).    If medicines for diarrhea or vomiting are prescribed, take only as directed.  General care and preventing spread of the illness    If symptoms are severe, rest at home for the next 24 hours or until you feel better.    Hand washing with soap and water is the best way to prevent the spread of infection. Wash your hands after touching anyone who is sick.    Wash your hands after using the toilet and before meals. Clean the toilet after each use.    Caffeine, tobacco, and alcohol can make your diarrhea, cramping, and pain worse.  Diet    Water and clear liquids are important so you do not get dehydrated. Drink a small amount at a time.    Do not force yourself to eat, especially if you have cramps, vomiting, or diarrhea. When you finally decide to start eating, do not eat large amounts at a time, even if you are hungry.    If you eat, avoid fatty, greasy, spicy, or fried foods.    Do not eat dairy products if you have diarrhea; they can make the diarrhea worse.  During  the first 24 hours (the first full day), follow the diet below:    Beverages: Water, clear liquids, soft drinks without caffeine, like ginger ale; mineral water (plain or flavored); decaffeinated tea and coffee.    Soups: Clear broth, consommé, and bouillon Sports drinks aren't a good choice because they have too much sugar and not enough electrolytes. In this case, commercially available products called oral rehydration solutions are best.    Desserts: Plain gelatin, popsicles, and fruit juice bars.  During the next 24 hours (the second day), you may add the following to the above if you have improved. If not, continue what you did the first day:    Hot cereal, plain toast, bread, rolls, crackers    Plain noodles, rice, mashed potatoes, chicken noodle or rice soup    Unsweetened canned fruit (avoid pineapple), bananas    Limit caffeine and chocolate. No spices or seasonings except salt.  During the next 24 hours    Gradually resume a normal diet, as you feel better and your symptoms improve.    If at any time your symptoms start getting worse, go back to clear liquids until you feel better.  Food preparation    If you have diarrhea, you should not prepare food for others. When you  prepare food for yourself, wash your hands before and after.    Wash your hands after using cutting boards, countertops, and knives that have been in contact with raw food.    Keep uncooked meats away from cooked and ready-to-eat foods.  Follow-up care  Follow up with your healthcare provider if you are not improving over the next 2 to 3 days, or as advised. If a stool (diarrhea) sample was taken, call for the results as directed.  When to seek medical advice  Call your healthcare provider right away if any of these occur:     Increasing abdominal pain or constant lower right abdominal pain    Continued vomiting (unable to keep liquids down)    Frequent diarrhea (more than 5 times a day)    Blood in vomit or stool (black or red  color)    Inability to tolerate solid food after a few days.    Dark urine, reduced urine output    Weakness, dizziness    Drowsiness    Fever of 100.4 F (38.0 C) or higher, or as directed by your healthcare provider    New rash  Call 911  Call 911 if any of these occur:    Trouble breathing    Chest pain    Confusion    Severe drowsiness or trouble awakening    Seizure    Stiff neck  Date Last Reviewed: 11/16/2015 2000-2017 [employer]. [address] [place] [address]. All rights reserved. This information is not intended as a substitute for professional medical care. Always follow your healthcare professional's instructions.         Patient Education   Diet for Vomiting or Diarrhea (Adult)    Your symptoms may return or get worse after eating certain foods listed below. If this happens, stop eating these foods until your symptoms ease and you feel better.  Once the vomiting stops, follow the steps below.   During the first 12 to 24 hours  During the first 12 to 24 hours, follow this diet:    Drinks. Plain water, sport drinks like electrolyte solutions, soft drinks without caffeine, mineral water (plain or flavored), clear fruit juices, and decaffeinated tea and coffee.    Soups. Clear broth.    Desserts. Plain gelatin, popsicles, and fruit juice bars. As you feel better, you may add 6 to 8 ounces of yogurt per day. If you have diarrhea, don't have foods or drinks that contain sugar, high-fructose corn syrup, or sugar alcohols.  During the next 24 hours  During the next 24 hours you may add the following to the above:    Hot cereal, plain toast, bread, rolls, and crackers    Plain noodles, rice, mashed potatoes, and chicken noodle or rice soup    Unsweetened canned fruit (but not pineapple) and bananas  Don't eat more than 15 grams of fat a day. Do this by staying away from margarine, butter, oils, mayonnaise, sauces, gravies, fried foods, peanut butter, meat, poultry, and fish.  Don't  eat much fiber. Stay away from raw or cooked vegetables, fresh fruits (except bananas), and bran cereals.  Limit how much caffeine and chocolate you have. Do not use any spices or seasonings except salt.  During the next 24 hours  Slowly go back to your normal diet, as you feel better and your symptoms ease.  Date Last Reviewed: 8/1/2016 2000-2017 [employer]. [address] [place] [address], [place] [id number]. All rights reserved. This information is not intended as a substitute for professional medical care. Always follow your healthcare professional's instructions.

## 2021-08-04 ENCOUNTER — VIRTUAL VISIT (OUTPATIENT)
Dept: RHEUMATOLOGY | Facility: CLINIC | Age: 61
End: 2021-08-04
Payer: COMMERCIAL

## 2021-08-04 DIAGNOSIS — L40.9 PSORIASIS: Primary | ICD-10-CM

## 2021-08-04 PROCEDURE — 99214 OFFICE O/P EST MOD 30 MIN: CPT | Mod: 95 | Performed by: INTERNAL MEDICINE

## 2021-08-04 RX ORDER — CLOBETASOL PROPIONATE 0.5 MG/G
OINTMENT TOPICAL
Qty: 60 G | Refills: 0 | Status: SHIPPED | OUTPATIENT
Start: 2021-08-04 | End: 2022-04-04

## 2021-08-04 NOTE — PATIENT INSTRUCTIONS
Summary of Your Rheumatology Visit    Next Appointment:  6 Months (in-clinic, 4:30 PM)    Medications:    Please follow directives on ointment on how to apply.     Referrals:    Dermatology    Tests:        Injections:      Other:

## 2021-08-04 NOTE — PROGRESS NOTES
Shay Mendosa who presents today with a chief complaint of  No chief complaint on file.      Joint Pains: no  Location: n/a  Onset:n/a  Intensity: 0 /10  AM Stiffness: none  Alleviating/Aggravating Factors:n/a  Tolerating Meds: yes  Other: active skin psoriasis over elbows and knees.      ROS:  Patient denies having any chest pain, shortness of breath, cough, abdominal pain, nausea, vomiting, rashes, fevers, oral ulcers and recent infections.  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     Other irritable bowel syndrome 4/4/2019     Peripheral neuropathy      Psoriasis      Psoriatic arthritis (H)      Vitamin D deficiency        Surgical History:  Past Surgical History:   Procedure Laterality Date     FOOT ARTHRODESIS, MODIFIED STERN Bilateral        Family History:  Family History   Problem Relation Age of Onset     Alcoholism Father      Heart Disease Father      Colon Polyps Sister        Social History:   reports that he has never smoked. He has never used smokeless tobacco. He reports that he does not drink alcohol.    Allergies:  Allergies   Allergen Reactions     Sulfa (Sulfonamide Antibiotics) [Sulfa Drugs] Unknown        Current Medications:  Current Outpatient Medications   Medication Sig Dispense Refill     escitalopram oxalate (LEXAPRO) 10 MG tablet [ESCITALOPRAM OXALATE (LEXAPRO) 10 MG TABLET] Take 1 tablet  (10 mg total) by mouth daily. 30 tablet 5     loperamide (IMODIUM A-D) 2 mg tablet [LOPERAMIDE (IMODIUM A-D) 2 MG TABLET] Take 2 tablets after first loose bowel movement; take 1 tablet after each subsequent loose stool up to 8 tablets in a 24 hour period. 24 tablet 0     simethicone (MYLICON) 80 MG chewable tablet [SIMETHICONE (MYLICON) 80 MG CHEWABLE TABLET] Chew 1 tablet (80 mg total) 4 (four) times a day as needed for flatulence. 100 tablet 0           Physical Exam:  Following up today via Video visit, per Covid-19 pandemic requirements.     Verbal consent has been obtained for this service by a care team member.     Video  call start time: 5:29 PM     Phone callend time: 5:50 PM     Phone number utilized:[phone], called patient states has a video capacity today via son's friend cell phone [phone].  Patient provides authorization to utilize the cell phone for video capacity today towards follow-up visit.        Summary/Assessment:      History that includes psoriasis and psoriatic arthritis.    Presents for a follow-up visit.    Claims joints are doing well at this time.  Denies having any active joint pains or swelling.    Currently not taking Tylenol.    Currently off of DMARDs, has been off of methotrexate for a while with good stability joint wise.    Has been experiencing some resurfacing skin psoriasis over extensor surfaces of elbows and knees.  Desires to try clobetasol ointment.  Is also interested in referral to dermatology.    Please see below for management plan.    From prior note:     - Not taking meloxicam.  Adds that meloxicam as well when taking contributed to headaches.  Is not interested in having another anti-inflammatory medication readily available at this time as he is doing well.    - Believes he was diagnosed with psoriasis about 25 years ago and psoriatic arthritis about 8 years ago.    Was on methotrexate in the past, discontinued soon after appointment with Dr. Perdomo in  "2017 as was concerned contributing to right eye pain eventually told to have central serous retinopathy bilaterally.  Claims to be followed by ophthalmology.      Pertinent rheumatology/past medical history (please refer to above for more detailed history):      Psoriatic arthritis    Psoriasis (referred to dermatology)    Chronic hand pains    Muscle cramping at night (distal lower extremities), mild and tolerable    Central serous retinopathy bilaterally    Hx bunion related surgeries, bilateral first toes      Rheumatology medications provided/suggested:    Tylenol  Clobetasol ointment    Pertinent medication from other providers or from otc (please refer to above for more detailed med list):    Spironolactone (for \"'excess fluid in eyes\")      Pertinent medications already tried:     Methotrexate (right eye pain)  Advil  Sulfa allergy  Meloxicam (headaches)      Pertinent lab history:    From 2016, negative/unremarkable: Rheumatoid factor, CCP antibody, XAVIER, hepatitis panel, HIV      Pertinent imaging/test history:    X-rays of hands were unremarkable.        Other:    Denies regular alcohol beverage intake or tobacco use.    Housekeeping work.     Was a patient of Dr. Perdomo, last seen April 2017.    On past visit, deferred adding a muscle relaxer for occasional muscle cramping of lower extremities.      Plan:        For active skin psoriasis involving extensor surfaces of elbows and knees will provide clobetasol ointment twice a day for up to 14 days.    We will also provide referral to dermatology to optimize long-term skin psoriasis.    For now joint pains have been stable, off meds.  Already made aware that if develops joint pains can take Tylenol 500-1000 mg twice daily as needed.  If insufficient relief with Tylenol he can contact us for an anti-inflammatory medication/NSAID.    Follow-up in 6 months in clinic (has limited video capacity, only when son is available).      This note was transcribed using " [employer] voice recognition software as a result unintentional grammatical errors or word substitutions may have occurred. Please contact our Rheumatology department if you need any clarification or if you have any related inquiries.    Major side effect profile of medications provided were discussed with the patient.      Greg Cosme DO  ....................  8/4/2021   10:57 AM

## 2022-04-04 ENCOUNTER — VIRTUAL VISIT (OUTPATIENT)
Dept: INTERNAL MEDICINE | Facility: CLINIC | Age: 62
End: 2022-04-04
Payer: COMMERCIAL

## 2022-04-04 DIAGNOSIS — L40.50 PSORIATIC ARTHRITIS (H): ICD-10-CM

## 2022-04-04 DIAGNOSIS — Z12.5 PROSTATE CANCER SCREENING: ICD-10-CM

## 2022-04-04 DIAGNOSIS — J98.8 VIRAL RESPIRATORY INFECTION: Primary | ICD-10-CM

## 2022-04-04 DIAGNOSIS — Z00.00 PREVENTATIVE HEALTH CARE: ICD-10-CM

## 2022-04-04 DIAGNOSIS — B97.89 VIRAL RESPIRATORY INFECTION: Primary | ICD-10-CM

## 2022-04-04 PROBLEM — F41.1 GENERALIZED ANXIETY DISORDER: Status: ACTIVE | Noted: 2020-12-16

## 2022-04-04 PROCEDURE — 99213 OFFICE O/P EST LOW 20 MIN: CPT | Mod: TEL | Performed by: INTERNAL MEDICINE

## 2022-04-04 NOTE — PATIENT INSTRUCTIONS
Covid swab    Flu swab    April 2 represents day 0 so he will stay off work through at least day 5 or Thursday, March 7 per CDC guidelines    Further advice based on results    Update preventive labs once illness resolved

## 2022-04-04 NOTE — PROGRESS NOTES
Shay is a 61 year old male being evaluated via a billable phone visit, and would like to be contacted via the following  Home number on file [phone] (home)     ASSESSMENT and PLAN:  1. Viral respiratory infection  Symptom onset April 2 which would be day 0.  Recommend quarantine through day 5 at least.  Recommend swab and if positive treat for Covid for influenza.  - Influenza A & B Antigen; Future  - Symptomatic; Yes; 4/2/2022 COVID-19 Virus (Coronavirus) by PCR Nose; Future    2. Psoriatic arthritis (H)  Reviewed history of methotrexate many years ago.  Should no longer be relevant  - Influenza A & B Antigen; Future  - Symptomatic; Yes; 4/2/2022 COVID-19 Virus (Coronavirus) by PCR Nose; Future  - Prostate Specific Antigen Screen; Future    3. Preventative health care  Update labs when illness resolved  - REVIEW OF HEALTH MAINTENANCE PROTOCOL ORDERS  - Lipid panel reflex to direct LDL Fasting; Future  - Vitamin D Deficiency; Future  - Basic metabolic panel; Future    4. Prostate cancer screening    Preventive Care Assessed: Update labs once viral illness resolved     Patient Instructions   Covid swab    Flu swab    April 2 represents day 0 so he will stay off work through at least day 5 or Thursday, March 7 per CDC guidelines    Further advice based on results    Update preventive labs once illness resolved     Medication list carefully reviewed and corrected  Epic Merger Problem list addressed and updated     Return in about 6 months (around 10/4/2022) for using a video visit.    CHIEF COMPLAINT:  Chief Complaint   Patient presents with     Sweats     intermittent chills, low grade fevers (100.9) , sweats x several wks       HISTORY OF PRESENT ILLNESS:  Shay is a 61 year old male contacting the clinic today via phone for complaints of fever.  He first became ill the evening of April 2 with fever and myalgias.  He has documented fevers as high as 104 yesterday with drenching sweats.  Slight sore throat  "and headache.  Cough but no shortness of breath.  No purulent sinus secretions or wheezing.  No cloudiness or odor to urine.  No rash.  Temperature checked while on the phone 96.8.  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 Non smoker/non drinker/ lives with 20+ yr old son who has autism variant. Patient enjoys [employer]. [employer] high school and one year of trade school      Called in sick yesterday and today    TOBACCO USE:  History   Smoking Status     Never Smoker   Smokeless Tobacco     Never Used       VITALS:  There were no vitals filed for this visit.  There were no vitals taken for this visit. Estimated body mass index is 23.29 kg/m  as calculated from the following:    Height as of 11/5/20: 1.803 m (5' 11\").    Weight as of 4/5/21: 75.8 kg (167 lb).    PHYSICAL EXAM:  (observations via Phone)  Talkative.  Anxious.  No shortness of breath    MEDICATIONS  Current Outpatient Medications   Medication Sig Dispense Refill     loperamide (IMODIUM A-D) 2 mg tablet [LOPERAMIDE (IMODIUM A-D) 2 MG TABLET] Take 2 tablets after first loose bowel movement; take 1 tablet after each subsequent loose stool up to 8 tablets in a 24 hour period. 24 tablet 0     simethicone (MYLICON) 80 MG chewable tablet [SIMETHICONE (MYLICON) 80 MG CHEWABLE TABLET] Chew 1 tablet (80 mg total) 4 (four) times a day as needed for flatulence. 100 tablet 0       Notes summarized:   Labs, x-rays, cardiology, GI tests reviewed:   Recent Labs   Lab Test 11/05/20  0824   HGB 14.4      POTASSIUM 4.2   CR 0.80   PSA 2.4     No results found for: [id number]  Lab Results   Component Value Date    CHOL 218 11/05/2020     New orders:   Orders Placed This Encounter   Procedures "     REVIEW OF HEALTH MAINTENANCE PROTOCOL ORDERS     Symptomatic; Yes; 4/2/2022 COVID-19 Virus (Coronavirus) by PCR Nose     Lipid panel reflex to direct LDL Fasting     Prostate Specific Antigen Screen     Vitamin D Deficiency     Basic metabolic panel       Independent review of:  Supplemental history by:      Patient would like to receive their AVS by Mail a copy    Stephen Baig MD     [facility]    Phone Start Time: 1:57 PM  Phone End time:  2:16 PM  Conversation plus orders: 19 minutes  Dictation time:  3 minutes    The visit lasted a total of 20 minutes       HPI

## 2022-04-05 ENCOUNTER — TELEPHONE (OUTPATIENT)
Dept: NURSING | Facility: CLINIC | Age: 62
End: 2022-04-05

## 2022-04-05 ENCOUNTER — LAB (OUTPATIENT)
Dept: FAMILY MEDICINE | Facility: CLINIC | Age: 62
End: 2022-04-05
Attending: INTERNAL MEDICINE
Payer: COMMERCIAL

## 2022-04-05 DIAGNOSIS — J98.8 VIRAL RESPIRATORY INFECTION: ICD-10-CM

## 2022-04-05 DIAGNOSIS — U07.1 COVID-19 VIRUS INFECTION: Primary | ICD-10-CM

## 2022-04-05 DIAGNOSIS — B97.89 VIRAL RESPIRATORY INFECTION: ICD-10-CM

## 2022-04-05 DIAGNOSIS — L40.50 PSORIATIC ARTHRITIS (H): ICD-10-CM

## 2022-04-05 LAB
FLUAV AG SPEC QL IA: NEGATIVE
FLUBV AG SPEC QL IA: NEGATIVE
SARS-COV-2 RNA RESP QL NAA+PROBE: POSITIVE

## 2022-04-05 PROCEDURE — U0005 INFEC AGEN DETEC AMPLI PROBE: HCPCS

## 2022-04-05 PROCEDURE — U0003 INFECTIOUS AGENT DETECTION BY NUCLEIC ACID (DNA OR RNA); SEVERE ACUTE RESPIRATORY SYNDROME CORONAVIRUS 2 (SARS-COV-2) (CORONAVIRUS DISEASE [COVID-19]), AMPLIFIED PROBE TECHNIQUE, MAKING USE OF HIGH THROUGHPUT TECHNOLOGIES AS DESCRIBED BY CMS-2020-01-R: HCPCS

## 2022-04-05 PROCEDURE — 99207 PR NO CHARGE LOS: CPT

## 2022-04-05 PROCEDURE — 87804 INFLUENZA ASSAY W/OPTIC: CPT

## 2022-04-05 NOTE — TELEPHONE ENCOUNTER
Patient classified as COVID treatment eligible by Epic high risk algorithm:  No    Coronavirus (COVID-19) Notification    Reason for call  Notify of POSITIVE COVID-19 lab result, assess symptoms,  review [facility] recommendations    Lab Result   Lab test for 2019-nCoV rRt-PCR or SARS-COV-2 PCR  Oropharyngeal AND/OR nasopharyngeal swabs were POSITIVE for 2019-nCoV RNA [OR] SARS-COV-2 RNA (COVID-19) RNA     We have been unable to reach patient by phone at this time to notify of their Positive COVID-19 result.    Left voicemail message requesting a call back to [phone] [facility] for results.        A Positive COVID-19 letter will be sent via Ev[employer] or the mail. (Exception, no letters sent to Presurgerical/Preprocedure Patients)    Anita Domingo LPN

## 2022-06-02 ENCOUNTER — OFFICE VISIT (OUTPATIENT)
Dept: INTERNAL MEDICINE | Facility: CLINIC | Age: 62
End: 2022-06-02
Payer: COMMERCIAL

## 2022-06-02 VITALS
SYSTOLIC BLOOD PRESSURE: 124 MMHG | HEIGHT: 71 IN | OXYGEN SATURATION: 99 % | RESPIRATION RATE: 16 BRPM | HEART RATE: 72 BPM | WEIGHT: 170.1 LBS | BODY MASS INDEX: 23.81 KG/M2 | DIASTOLIC BLOOD PRESSURE: 76 MMHG

## 2022-06-02 DIAGNOSIS — F79 INTELLECTUAL FUNCTIONING DISABILITY: Primary | Chronic | ICD-10-CM

## 2022-06-02 DIAGNOSIS — K58.8 OTHER IRRITABLE BOWEL SYNDROME: ICD-10-CM

## 2022-06-02 DIAGNOSIS — L40.50 PSORIATIC ARTHRITIS (H): ICD-10-CM

## 2022-06-02 DIAGNOSIS — E78.5 HYPERLIPIDEMIA LDL GOAL <130: ICD-10-CM

## 2022-06-02 DIAGNOSIS — Z12.5 SCREENING FOR PROSTATE CANCER: ICD-10-CM

## 2022-06-02 DIAGNOSIS — H35.713 CENTRAL SEROUS RETINOPATHY, BILATERAL: Chronic | ICD-10-CM

## 2022-06-02 DIAGNOSIS — E55.9 VITAMIN D DEFICIENCY: ICD-10-CM

## 2022-06-02 LAB
ALBUMIN SERPL-MCNC: 3.7 G/DL (ref 3.5–5)
ALP SERPL-CCNC: 65 U/L (ref 45–120)
ALT SERPL W P-5'-P-CCNC: 13 U/L (ref 0–45)
ANION GAP SERPL CALCULATED.3IONS-SCNC: 11 MMOL/L (ref 5–18)
AST SERPL W P-5'-P-CCNC: 19 U/L (ref 0–40)
BILIRUB SERPL-MCNC: 0.7 MG/DL (ref 0–1)
BUN SERPL-MCNC: 28 MG/DL (ref 8–22)
CALCIUM SERPL-MCNC: 9.2 MG/DL (ref 8.5–10.5)
CHLORIDE BLD-SCNC: 104 MMOL/L (ref 98–107)
CHOLEST SERPL-MCNC: 256 MG/DL
CO2 SERPL-SCNC: 26 MMOL/L (ref 22–31)
CREAT SERPL-MCNC: 0.74 MG/DL (ref 0.7–1.3)
ERYTHROCYTE [DISTWIDTH] IN BLOOD BY AUTOMATED COUNT: 12.7 % (ref 10–15)
FASTING STATUS PATIENT QL REPORTED: ABNORMAL
GFR SERPL CREATININE-BSD FRML MDRD: >90 ML/MIN/1.73M2
GLUCOSE BLD-MCNC: 95 MG/DL (ref 70–125)
HCT VFR BLD AUTO: 42.1 % (ref 40–53)
HDLC SERPL-MCNC: 44 MG/DL
HGB BLD-MCNC: 14.1 G/DL (ref 13.3–17.7)
LDLC SERPL CALC-MCNC: 190 MG/DL
MCH RBC QN AUTO: 28.3 PG (ref 26.5–33)
MCHC RBC AUTO-ENTMCNC: 33.5 G/DL (ref 31.5–36.5)
MCV RBC AUTO: 84 FL (ref 78–100)
PLATELET # BLD AUTO: 264 10E3/UL (ref 150–450)
POTASSIUM BLD-SCNC: 4.3 MMOL/L (ref 3.5–5)
PROT SERPL-MCNC: 7 G/DL (ref 6–8)
PSA SERPL-MCNC: 2.05 UG/L (ref 0–4.5)
RBC # BLD AUTO: 4.99 10E6/UL (ref 4.4–5.9)
SODIUM SERPL-SCNC: 141 MMOL/L (ref 136–145)
TRIGL SERPL-MCNC: 108 MG/DL
WBC # BLD AUTO: 4.7 10E3/UL (ref 4–11)

## 2022-06-02 PROCEDURE — G0103 PSA SCREENING: HCPCS | Performed by: INTERNAL MEDICINE

## 2022-06-02 PROCEDURE — 80053 COMPREHEN METABOLIC PANEL: CPT | Performed by: INTERNAL MEDICINE

## 2022-06-02 PROCEDURE — 80061 LIPID PANEL: CPT | Performed by: INTERNAL MEDICINE

## 2022-06-02 PROCEDURE — 82306 VITAMIN D 25 HYDROXY: CPT | Performed by: INTERNAL MEDICINE

## 2022-06-02 PROCEDURE — 85027 COMPLETE CBC AUTOMATED: CPT | Performed by: INTERNAL MEDICINE

## 2022-06-02 PROCEDURE — 36415 COLL VENOUS BLD VENIPUNCTURE: CPT | Performed by: INTERNAL MEDICINE

## 2022-06-02 PROCEDURE — 99396 PREV VISIT EST AGE 40-64: CPT | Performed by: INTERNAL MEDICINE

## 2022-06-02 ASSESSMENT — ENCOUNTER SYMPTOMS
NERVOUS/ANXIOUS: 0
FEVER: 0
COUGH: 0
HEMATOCHEZIA: 0
EYE PAIN: 0
HEMATURIA: 0
ARTHRALGIAS: 0
CHILLS: 0
HEARTBURN: 0
CONSTIPATION: 0
JOINT SWELLING: 0
DIARRHEA: 0
ABDOMINAL PAIN: 0
DIZZINESS: 0
FREQUENCY: 0

## 2022-06-02 ASSESSMENT — PAIN SCALES - GENERAL: PAINLEVEL: NO PAIN (0)

## 2022-06-02 NOTE — PROGRESS NOTES
SUBJECTIVE:   CC: Shay Mendosa is an 61 year old male who presents for preventative health visit.     HPI;  He feels he is OK. Continues to work.  Not taking the medications for his eyes.  Uses allegra, and he feels it helps his eyes.  Less stress and headaches as well.  His psoriasis rash is 'OK'. The hand arthritis is about the same.  Bowel and bladder function are OK. Some frequency, and loose BM at times.  Has nocturia, without dysuria, unchanged.      Patient has been advised of split billing requirements and indicates understanding: Yes  Healthy Habits:   PHQ-2 Total Score: 1    Today's PHQ-2 Score:   PHQ-2 ( 1999 Pfizer) 6/2/2022   Q1: Little interest or pleasure in doing things 1   Q2: Feeling down, depressed or hopeless 0   PHQ-2 Score 1   Q1: Little interest or pleasure in doing things Not at all   Q2: Feeling down, depressed or hopeless Not at all   PHQ-2 Score 0     Abuse: Current or Past(Physical, Sexual or Emotional)- No  Do you feel safe in your environment? YES    No, advance care planning information given to patient to review.  Patient declined advance care planning discussion at this time.    Social History     Tobacco Use     Smoking status: Never Smoker     Smokeless tobacco: Never Used   Substance Use Topics     Alcohol use: No     Alcohol Use 6/2/2022   Prescreen: >3 drinks/day or >7 drinks/week? 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     Other irritable bowel syndrome  "4/4/2019     Peripheral neuropathy      Psoriasis      Psoriatic arthritis (H)      Vitamin D deficiency       Past Surgical History:   Procedure Laterality Date     FOOT ARTHRODESIS, MODIFIED STERN Bilateral      Review of Systems  See HPI    OBJECTIVE:   /76 (BP Location: Left arm, Patient Position: Sitting, Cuff Size: Adult Large)   Pulse 72   Resp 16   Ht 1.803 m (5' 11\")   Wt 77.2 kg (170 lb 1.6 oz)   SpO2 99%   BMI 23.72 kg/m      PHYSICAL EXAM:  SKIN:  Diffuse areas of plaque psoriasis, not thick, or irritated.   NECK:  without cervical or axillary adenopathy  RESPIRATORY: Clear   CARDIOVASCULAR: S1, S2  ABDOMEN: soft, flat, and non-tender, without mass, rebound, or guarding  RECTAL: deferred  GENITOURINARY: normal testes and phallus  EXTREMITIES: some synovitis right 4th DIP joint.    NEUROLOGIC: No arm or leg  weakness, speech is clear, gait is normal  PSYCHIATRIC: Oriented X 3, without confusion, behavior and affect normal, thinking is clear    ASSESSMENT/PLAN:     Shay was seen today for recheck medication and physical.    Diagnoses and all orders for this visit:    Intellectual functioning disability    Psoriatic arthritis   Mildly active, he has tried treatment and didn't like it.     Hyperlipidemia LDL goal <130  Will assess levels he does not want treatment.    Other irritable bowel syndrome  Mild chronic symptoms, no new issues.     Central serous retinopathy, bilateral  Mostly right eye difficulties.  Not taking the recommended spironolactone. Urged to follow with ophthalmology which he plans.     Screening for colon cancer  Negative cologuard, 11/2020.  Due 11/2023    Screening for prostate cancer  PSA test    COUNSELING:   Reviewed preventive health counseling, as reflected in patient instructions       Regular exercise       Healthy diet/nutrition       Colorectal cancer screening       Prostate cancer screening    Estimated body mass index is 23.72 kg/m  as calculated from the " "following:    Height as of this encounter: 1.803 m (5' 11\").    Weight as of this encounter: 77.2 kg (170 lb 1.6 oz).   He reports that he has never smoked. He has never used smokeless tobacco.    Samuel Almonte MD  [facility]  "

## 2022-06-03 LAB — DEPRECATED CALCIDIOL+CALCIFEROL SERPL-MC: 20 UG/L (ref 20–75)

## 2022-08-26 ENCOUNTER — OFFICE VISIT (OUTPATIENT)
Dept: FAMILY MEDICINE | Facility: CLINIC | Age: 62
End: 2022-08-26
Payer: COMMERCIAL

## 2022-08-26 VITALS
TEMPERATURE: 98.1 F | HEART RATE: 66 BPM | BODY MASS INDEX: 23.71 KG/M2 | WEIGHT: 170 LBS | DIASTOLIC BLOOD PRESSURE: 75 MMHG | OXYGEN SATURATION: 98 % | SYSTOLIC BLOOD PRESSURE: 120 MMHG | RESPIRATION RATE: 14 BRPM

## 2022-08-26 DIAGNOSIS — T63.301A SPIDER BITE WOUND, ACCIDENTAL OR UNINTENTIONAL, INITIAL ENCOUNTER: Primary | ICD-10-CM
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
## 2022-08-26 DIAGNOSIS — L08.9: ICD-10-CM

## 2022-08-26 DIAGNOSIS — S50.362S: ICD-10-CM

## 2022-08-26 PROCEDURE — 99213 OFFICE O/P EST LOW 20 MIN: CPT | Performed by: NURSE PRACTITIONER

## 2022-08-26 RX ORDER — TRIAMCINOLONE ACETONIDE 1 MG/G
CREAM TOPICAL
COMMUNITY
Start: 2022-08-24 | End: 2023-02-08

## 2022-08-26 ASSESSMENT — ENCOUNTER SYMPTOMS
CHILLS: 0
FEVER: 0
APPETITE CHANGE: 0
NUMBNESS: 0
ACTIVITY CHANGE: 0
FATIGUE: 0
WEAKNESS: 0
COLOR CHANGE: 1

## 2022-08-26 NOTE — PROGRESS NOTES
Patient presents with:  Derm Problem: Has rash on left forearm for 1 week  wonders if spider bite seen yesterday for it using Triacinolone cream has not gotten the antibiotic yet  wants it re looked at    Clinical Decision Making: Focused exam positive for left forearm and antecubital region with a 5 x 2 cm area of increased erythremia and warmth consistent with a spider bite and progressive worsening infection.  Vitally stable.  Patient was instructed to start Keflex antibiotic course, verbalized understanding and stated he will start today.  Patient may continue to use triamcinolone cream as well.  Discussed red flag symptoms of worsening wound infection and when to present for reevaluation, patient verbalized understanding.      ICD-10-CM    1. Spider bite wound, accidental or unintentional, initial encounter  T63.301A    2. Infected insect bite of elbow, left, sequela  S50.362S     L08.9     W57.XXXS        Patient Instructions   Please start taking cephalexin antibiotic that you have already been prescribed today.      HPI: Shay Mendosa is a 61 year old male who presents today complaining of increased erythremia and warmth along the left forearm where he has sustained a spider bite for which she was evaluated approximately 1 week ago.  At that time patient was prescribed triamcinolone cream and Keflex antibiotic course that he was instructed to take if symptoms did not improve and rash progressed with increased erythremia.  Patient presents to urgent care today requesting reevaluation of rash and asking whether or not he should start his prescribed Keflex that he has at home.  Endorses increased tenderness to the area.  Denies any fevers, myalgia, chills symptoms.    History obtained from the patient.    Problem List:  2022-04: Psoriatic arthritis (H)  2020-12: Generalized anxiety disorder  2019-12: Peripheral polyneuropathy  2019-08: Muscle cramping  2019-04: Other irritable bowel syndrome  2018-05:  Decreased vision of right eye  2017-06: Intellectual functioning disability  2017-04: Central serous retinopathy, bilateral  2012-08: High risk medication use  Hyperlipidemia LDL goal <130  Psoriasis  Psoriatic arthropathy (H)      Past Medical History:   Diagnosis Date     Anxiety     chronic stress/intellectually disabled son     Autism spectrum disorder      Central serous retinopathy      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     Other irritable bowel syndrome 4/4/2019     Peripheral neuropathy      Psoriasis      Psoriatic arthritis (H)      Vitamin D deficiency        Social History     Tobacco Use     Smoking status: Never Smoker     Smokeless tobacco: Never Used   Substance Use Topics     Alcohol use: No       Review of Systems   Constitutional: Negative for activity change, appetite change, chills, fatigue and fever.   Skin: Positive for color change and rash.   Neurological: Negative for weakness and numbness.       Vitals:    08/26/22 1632   BP: 120/75   Pulse: 66   Resp: 14   Temp: 98.1  F (36.7  C)   TempSrc: Oral   SpO2: 98%   Weight: 77.1 kg (170 lb)       Physical Exam  Constitutional:       Appearance: Normal appearance.   Cardiovascular:      Rate and Rhythm: Normal rate and regular rhythm.      Pulses: Normal pulses.      Heart sounds: Normal heart sounds.   Pulmonary:      Effort: Pulmonary effort is normal.      Breath sounds: Normal breath sounds.   Musculoskeletal:         General: No swelling, tenderness or signs of injury. Normal range of motion.   Skin:     General: Skin is warm.      Capillary Refill: Capillary refill takes less than 2 seconds.      Findings: Erythema present.      Comments: Left forearm and antecubital region with a 5 x 2 cm area of increased erythremia and warmth consistent with a spider bite and progressive worsening infection.    Neurological:      Mental Status: He is alert and oriented to person, place, and time.   Psychiatric:          Behavior: Behavior normal.         Labs:  No results found for any visits on 08/26/22.      At the end of the encounter, I discussed results, diagnosis, medications. Discussed red flags for immediate return to clinic/ER, as well as indications for follow up if no improvement. Patient understood and agreed to plan.     AURA Louis CNP

## 2022-09-04 ENCOUNTER — OFFICE VISIT (OUTPATIENT)
Dept: FAMILY MEDICINE | Facility: CLINIC | Age: 62
End: 2022-09-04
Payer: COMMERCIAL

## 2022-09-04 VITALS
TEMPERATURE: 97.5 F | SYSTOLIC BLOOD PRESSURE: 145 MMHG | DIASTOLIC BLOOD PRESSURE: 77 MMHG | OXYGEN SATURATION: 96 % | HEART RATE: 64 BPM
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
## 2022-09-04 PROCEDURE — 99212 OFFICE O/P EST SF 10 MIN: CPT | Performed by: FAMILY MEDICINE

## 2022-09-04 RX ORDER — CEPHALEXIN 500 MG/1
CAPSULE ORAL
COMMUNITY
Start: 2022-08-24 | End: 2023-02-20

## 2022-09-04 NOTE — PROGRESS NOTES
Assessment:       Bug bite, subsequent encounter           Plan:     Patient here for follow-up of bug bite.  Reassurance given that it looks like it is almost completely resolved.  Overall significantly improved since placed on cephalexin.  Further follow-up needed.    MEDICATIONS:   Orders Placed This Encounter   Medications     cephALEXin (KEFLEX) 500 MG capsule     Sig: TAKE 1 CAPSULE BY MOUTH 3 TIMES A DAY FOR 7 DAYS         Subjective:       61 year old male presents for follow-up of a bug bite on his left forearm.  He was started on a 7-day course of cephalexin and given topical triamcinolone cream as well.  It is almost completely resolved.  He has not had any fevers.  No tenderness or any further itching.    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     Other irritable bowel syndrome 4/4/2019     Peripheral neuropathy      Psoriasis      Psoriatic arthritis (H)      Vitamin D deficiency        Past Surgical History:   Procedure Laterality Date     FOOT ARTHRODESIS, MODIFIED STERN Bilateral        Current Outpatient Medications   Medication     cephALEXin (KEFLEX) 500 MG capsule     loperamide (IMODIUM A-D) 2 mg tablet     simethicone (MYLICON) 80 MG chewable tablet     triamcinolone (KENALOG) 0.1 % external cream     No current facility-administered medications for this visit.       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 Non smoker/non drinker/ lives with 20+ yr old son who has autism variant. Patient enjoys bowling. [employer] school and one year of trade school          Review of Systems  Pertinent items are noted in HPI.      Objective:     BP (!) 145/77 (BP Location: Right arm, Patient Position: Sitting, Cuff Size: Adult Regular)   Pulse 64   Temp 97.5  F (36.4  C) (Tympanic)   SpO2 96%      General appearance: alert, appears stated age and cooperative  Skin: Patient with very faint area of erythema on his left forearm with a bite in the middle.  Nontender.           This note has been dictated using voice recognition software. Any grammatical or context distortions are unintentional and inherent to the software

## 2022-09-16 ENCOUNTER — TRANSFERRED RECORDS (OUTPATIENT)
Dept: HEALTH INFORMATION MANAGEMENT | Facility: CLINIC | Age: 62
End: 2022-09-16

## 2023-02-08 ENCOUNTER — OFFICE VISIT (OUTPATIENT)
Dept: INTERNAL MEDICINE | Facility: CLINIC | Age: 63
End: 2023-02-08
Payer: COMMERCIAL

## 2023-02-08 ENCOUNTER — ANCILLARY PROCEDURE (OUTPATIENT)
Dept: GENERAL RADIOLOGY | Facility: CLINIC | Age: 63
End: 2023-02-08
Attending: INTERNAL MEDICINE
Payer: COMMERCIAL

## 2023-02-08 VITALS
RESPIRATION RATE: 11 BRPM | OXYGEN SATURATION: 98 % | WEIGHT: 173.1 LBS | TEMPERATURE: 97.9 F | SYSTOLIC BLOOD PRESSURE: 120 MMHG | DIASTOLIC BLOOD PRESSURE: 78 MMHG | HEART RATE: 76 BPM | HEIGHT: 72 IN | BODY MASS INDEX: 23.45 KG/M2

## 2023-02-08 DIAGNOSIS — R09.81 CONGESTION OF PARANASAL SINUS: ICD-10-CM

## 2023-02-08 DIAGNOSIS — H35.713 CENTRAL SEROUS RETINOPATHY, BILATERAL: Primary | Chronic | ICD-10-CM

## 2023-02-08 DIAGNOSIS — J30.0 VASOMOTOR RHINITIS: ICD-10-CM

## 2023-02-08 PROCEDURE — 70210 X-RAY EXAM OF SINUSES: CPT | Mod: TC | Performed by: RADIOLOGY

## 2023-02-08 PROCEDURE — 99214 OFFICE O/P EST MOD 30 MIN: CPT | Performed by: INTERNAL MEDICINE

## 2023-02-08 RX ORDER — FLUTICASONE PROPIONATE 50 MCG
1 SPRAY, SUSPENSION (ML) NASAL DAILY
Qty: 9.9 ML | Refills: 11 | Status: SHIPPED | OUTPATIENT
Start: 2023-02-08 | End: 2023-09-20

## 2023-02-08 NOTE — PROGRESS NOTES
ASSESSMENT AND PLAN:    1. Central serous retinopathy, bilateral  Ongoing ophthalmologic care.  Mostly right eye, really hasn't progressed much, he has opted for observation. He felt the eplenerone caused headaches.     2. Congestion of paranasal sinus  Xray is reviewed.  Normal looking. Suspect vasomotor rhinitis.    - XR Sinus 1/2 Views; Future    3. Vasomotor rhinitis  He agrees to a trial of steroid nose inhaler therapy.   - fluticasone (FLONASE) 50 MCG/ACT nasal spray; Spray 1 spray into both nostrils daily  Dispense: 9.9 mL; Refill: 11    4. Headache  Symptoms and exam are consistent with myofascial cervical/cranial tension headaches. Reassured.  Discussed using ibuprofen and/or acetaminophen.  Consider exercise classes, or yoga, etc for upper back and neck stretching and fitness.     Follow up in 6 months.     CHIEF COMPLAINT:  Headaches and nasal congestion    HISTORY OF PRESENT ILLNESS:  Shay Mendosa is a 62 year old male with headaches. These are bifrontal, and steady, without pulsation or nausea.  Has chronic visual disturbance in the right eye, not different with the headaches.  Sleeps well, is actively working without issues.  No nausea or emesis, or arm or leg symptoms. He wants his sinuses 'checked' as he gets clear rhinorrhea in the cold, and his nose will also feel stuffy.  Doesn't have allergies or known issues with dust or mold. No dyspnea and sleeps OK. Seen today with his daughter.     REVIEW OF SYSTEMS:   See HPI, all other systems on review are negative.    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     Other irritable bowel syndrome 4/4/2019     Peripheral neuropathy      Psoriasis      Psoriatic arthritis (H)      Vitamin D deficiency      History   Smoking Status     Never   Smokeless Tobacco  "    Never     Family History   Problem Relation Age of Onset     Alcoholism Father      Heart Disease Father      Colon Polyps Sister      Past Surgical History:   Procedure Laterality Date     FOOT ARTHRODESIS, MODIFIED STERN Bilateral      VITALS:  Vitals:    02/08/23 1551   BP: 120/78   BP Location: Left arm   Patient Position: Sitting   Cuff Size: Adult Large   Pulse: 76   Resp: 11   Temp: 97.9  F (36.6  C)   TempSrc: Tympanic   SpO2: 98%   Weight: 78.5 kg (173 lb 1.6 oz)   Height: 1.816 m (5' 11.5\")     Wt Readings from Last 3 Encounters:   02/08/23 78.5 kg (173 lb 1.6 oz)   08/26/22 77.1 kg (170 lb)   06/02/22 77.2 kg (170 lb 1.6 oz)     PHYSICAL EXAM:  Constitutional:  In NAD, alert and oriented  HEENT: nose and throat clear, ears normal  Neck: no cervical or axillary adenopathy  Cardiac:  S1 S2   Lungs: Clear   Abdomen:   Soft, flat and non-tender    DECISION TO OBTAIN OLD RECORDS AND/OR OBTAIN HISTORY FROM SOMEONE OTHER THAN PATIENT, AND/OR ACCESSING CARE EVERYWHERE):  1  Reviewed last eye evaluation     REVIEW AND SUMMARIZATION OF OLD RECORDS, AND/OR OBTAINING HISTORY FROM SOMEONE OTHER THAN PATIENT, AND/OR DISCUSSION OF CASE WITH ANOTHER HEALTH CARE PROVIDER:  2 reviewed most recent care evaluations    REVIEW AND/OR ORDER OF OF CLINICAL LAB TESTS: 1  0.    REVIEW AND/OR ORDER OF RADIOLOGY TESTS: 1 ordered.    REVIEW AND/OR ORDER OF MEDICAL TESTS (EKG/ECHO/COLONOSCOPY/EGD): 1  0    INDEPENDENT  VISUALIZATION OF IMAGE, TRACING, OR SPECIMEN ITSELF (2 EACH):  2 personally viewed and interpreted the sinus xray and reviewed the films with patient and daughter.      TOTAL: 4    Current Outpatient Medications   Medication Sig Dispense Refill     fluticasone (FLONASE) 50 MCG/ACT nasal spray Spray 1 spray into both nostrils daily 9.9 mL 11     cephALEXin (KEFLEX) 500 MG capsule TAKE 1 CAPSULE BY MOUTH 3 TIMES A DAY FOR 7 DAYS (Patient not taking: Reported on 2/8/2023)       loperamide (IMODIUM A-D) 2 mg tablet " [LOPERAMIDE (IMODIUM A-D) 2 MG TABLET] Take 2 tablets after first loose bowel movement; take 1 tablet after each subsequent loose stool up to 8 tablets in a 24 hour period. (Patient not taking: Reported on 8/26/2022) 24 tablet 0     Samuel Almonte MD  Internal Medicine  [facility]

## 2023-02-20 ENCOUNTER — OFFICE VISIT (OUTPATIENT)
Dept: FAMILY MEDICINE | Facility: CLINIC | Age: 63
End: 2023-02-20
Payer: COMMERCIAL

## 2023-02-20 VITALS
SYSTOLIC BLOOD PRESSURE: 130 MMHG | TEMPERATURE: 98.7 F | WEIGHT: 172.1 LBS | HEART RATE: 63 BPM | DIASTOLIC BLOOD PRESSURE: 73 MMHG | OXYGEN SATURATION: 97 % | BODY MASS INDEX: 23.67 KG/M2 | RESPIRATION RATE: 18 BRPM

## 2023-02-20 DIAGNOSIS — M26.622 ARTHRALGIA OF LEFT TEMPOROMANDIBULAR JOINT: Primary | ICD-10-CM

## 2023-02-20 DIAGNOSIS — K14.9 TONGUE IRRITATION: ICD-10-CM

## 2023-02-20 PROCEDURE — 99213 OFFICE O/P EST LOW 20 MIN: CPT | Performed by: FAMILY MEDICINE

## 2023-02-21 NOTE — PROGRESS NOTES
"OUTPATIENT VISIT NOTE                                                   Date of Visit: 2/20/2023     Chief Complaint   Patient presents with:  Mouth Problem: Lt jaw sore. Red spots bottom of tongue \"feels funny.\" Mouth irration. Sore throat.            History of Present Illness   Shay Mendosa is a 62 year old male has had some red spots on tip of tongue for the last couple of days.  Left jaw is sore.  No fever.  No pain with swallowing.  No rash on hands or feet.    Gums are sensitive.       MEDICATIONS   Current Outpatient Medications   Medication     fluticasone (FLONASE) 50 MCG/ACT nasal spray     No current facility-administered medications for this visit.         SOCIAL HISTORY   Social History     Tobacco Use     Smoking status: Never     Smokeless tobacco: Never   Substance Use Topics     Alcohol use: No           Physical Exam   Vitals:    02/20/23 1912   BP: 130/73   Pulse: 63   Resp: 18   Temp: 98.7  F (37.1  C)   TempSrc: Oral   SpO2: 97%   Weight: 78.1 kg (172 lb 1.6 oz)        GENERAL:   Alert. Oriented.  EYES: Clear  HENT:  Ears: R TM pearly gray. Normal landmarks. L TM pearly gray.  Normal landmarks  Nose: Clear.  Sinuses: Nontender.  Oropharynx:  No erythema. No exudate.  Tongue: no ulcerations.  Slight erythema of tip of tongue.  TMJ: tender over left jaw joint.  NECK: Supple. No adenopathy.  LUNGS: Clear to ascultation.  No crackles.  No wheezing  HEART: RRR  SKIN:  No rash.          Assessment and Plan     Arthralgia of left temporomandibular joint  Medication as directed.  Heat and/orice to jaw at least 3 times a day for 10 minutes.  Avoid clenching teeth.  Avoid chewing gum.  May try over the counter appliance.     Tongue irritation  Salt water gargles.                   Discussed signs / symptoms that warrant urgent / emergent medical attention.     Recheck if worsening or not improving.       He Ontiveros MD          Pertinent History     The following portions of the patient's history " were reviewed and updated as appropriate: allergies, current medications, past family history, past medical history, past social history, past surgical history and problem list.

## 2023-02-21 NOTE — PATIENT INSTRUCTIONS
Salt water gargles.    Put ice on the left jaw joint for 10 minutes 3-4 times a day.    Soft diet.    Tylenol or ibuprofen if needed for pain

## 2023-04-03 DIAGNOSIS — J30.0 VASOMOTOR RHINITIS: ICD-10-CM

## 2023-04-03 RX ORDER — FLUTICASONE PROPIONATE 50 MCG
1 SPRAY, SUSPENSION (ML) NASAL DAILY
Qty: 9.9 ML | Refills: 11 | OUTPATIENT
Start: 2023-04-03

## 2023-05-03 ENCOUNTER — PATIENT OUTREACH (OUTPATIENT)
Dept: CARE COORDINATION | Facility: CLINIC | Age: 63
End: 2023-05-03
Payer: COMMERCIAL

## 2023-05-17 ENCOUNTER — PATIENT OUTREACH (OUTPATIENT)
Dept: CARE COORDINATION | Facility: CLINIC | Age: 63
End: 2023-05-17
Payer: COMMERCIAL

## 2023-07-15 ENCOUNTER — HEALTH MAINTENANCE LETTER (OUTPATIENT)
Age: 63
End: 2023-07-15

## 2023-07-25 ENCOUNTER — TRANSFERRED RECORDS (OUTPATIENT)
Dept: HEALTH INFORMATION MANAGEMENT | Facility: CLINIC | Age: 63
End: 2023-07-25
Payer: COMMERCIAL

## 2023-09-06 ENCOUNTER — APPOINTMENT (OUTPATIENT)
Dept: MRI IMAGING | Facility: HOSPITAL | Age: 63
End: 2023-09-06
Attending: PSYCHIATRY & NEUROLOGY
Payer: COMMERCIAL

## 2023-09-06 ENCOUNTER — HOSPITAL ENCOUNTER (EMERGENCY)
Facility: HOSPITAL | Age: 63
Discharge: HOME | End: 2023-09-06
Attending: EMERGENCY MEDICINE | Admitting: EMERGENCY MEDICINE
Payer: COMMERCIAL

## 2023-09-06 ENCOUNTER — APPOINTMENT (OUTPATIENT)
Dept: CT IMAGING | Facility: HOSPITAL | Age: 63
End: 2023-09-06
Attending: EMERGENCY MEDICINE
Payer: COMMERCIAL

## 2023-09-06 VITALS
HEIGHT: 71 IN | TEMPERATURE: 98.3 F | WEIGHT: 165 LBS | BODY MASS INDEX: 23.1 KG/M2 | SYSTOLIC BLOOD PRESSURE: 140 MMHG | OXYGEN SATURATION: 94 % | HEART RATE: 62 BPM | DIASTOLIC BLOOD PRESSURE: 72 MMHG | RESPIRATION RATE: 20 BRPM

## 2023-09-06 DIAGNOSIS — R42 VERTIGO: ICD-10-CM

## 2023-09-06 DIAGNOSIS — R53.1 WEAKNESS: ICD-10-CM

## 2023-09-06 LAB
ANION GAP SERPL CALCULATED.3IONS-SCNC: 10 MMOL/L (ref 7–15)
APTT PPP: 32 SECONDS (ref 22–38)
BASOPHILS # BLD AUTO: 0 10E3/UL (ref 0–0.2)
BASOPHILS NFR BLD AUTO: 1 %
BUN SERPL-MCNC: 27.9 MG/DL (ref 8–23)
CALCIUM SERPL-MCNC: 9 MG/DL (ref 8.8–10.2)
CHLORIDE SERPL-SCNC: 104 MMOL/L (ref 98–107)
CREAT SERPL-MCNC: 0.79 MG/DL (ref 0.67–1.17)
DEPRECATED HCO3 PLAS-SCNC: 27 MMOL/L (ref 22–29)
EGFRCR SERPLBLD CKD-EPI 2021: >90 ML/MIN/1.73M2
EOSINOPHIL # BLD AUTO: 0 10E3/UL (ref 0–0.7)
EOSINOPHIL NFR BLD AUTO: 1 %
ERYTHROCYTE [DISTWIDTH] IN BLOOD BY AUTOMATED COUNT: 12.8 % (ref 10–15)
GLUCOSE SERPL-MCNC: 111 MG/DL (ref 70–99)
HCT VFR BLD AUTO: 42.3 % (ref 40–53)
HGB BLD-MCNC: 14.3 G/DL (ref 13.3–17.7)
IMM GRANULOCYTES # BLD: 0 10E3/UL
IMM GRANULOCYTES NFR BLD: 1 %
INR PPP: 0.96 (ref 0.85–1.15)
LYMPHOCYTES # BLD AUTO: 1.7 10E3/UL (ref 0.8–5.3)
LYMPHOCYTES NFR BLD AUTO: 26 %
MCH RBC QN AUTO: 28.6 PG (ref 26.5–33)
MCHC RBC AUTO-ENTMCNC: 33.8 G/DL (ref 31.5–36.5)
MCV RBC AUTO: 85 FL (ref 78–100)
MONOCYTES # BLD AUTO: 0.4 10E3/UL (ref 0–1.3)
MONOCYTES NFR BLD AUTO: 7 %
NEUTROPHILS # BLD AUTO: 4.4 10E3/UL (ref 1.6–8.3)
NEUTROPHILS NFR BLD AUTO: 64 %
NRBC # BLD AUTO: 0 10E3/UL
NRBC BLD AUTO-RTO: 0 /100
PLATELET # BLD AUTO: 272 10E3/UL (ref 150–450)
POTASSIUM SERPL-SCNC: 3.9 MMOL/L (ref 3.4–5.3)
RBC # BLD AUTO: 5 10E6/UL (ref 4.4–5.9)
SODIUM SERPL-SCNC: 141 MMOL/L (ref 136–145)
TROPONIN T SERPL HS-MCNC: 9 NG/L
WBC # BLD AUTO: 6.6 10E3/UL (ref 4–11)

## 2023-09-06 PROCEDURE — 85610 PROTHROMBIN TIME: CPT | Performed by: EMERGENCY MEDICINE

## 2023-09-06 PROCEDURE — 99448 NTRPROF PH1/NTRNET/EHR 21-30: CPT | Performed by: PSYCHIATRY & NEUROLOGY

## 2023-09-06 PROCEDURE — 99285 EMERGENCY DEPT VISIT HI MDM: CPT | Mod: 25

## 2023-09-06 PROCEDURE — 85025 COMPLETE CBC W/AUTO DIFF WBC: CPT | Performed by: EMERGENCY MEDICINE

## 2023-09-06 PROCEDURE — 82947 ASSAY GLUCOSE BLOOD QUANT: CPT | Performed by: EMERGENCY MEDICINE

## 2023-09-06 PROCEDURE — 70496 CT ANGIOGRAPHY HEAD: CPT

## 2023-09-06 PROCEDURE — 82435 ASSAY OF BLOOD CHLORIDE: CPT | Performed by: EMERGENCY MEDICINE

## 2023-09-06 PROCEDURE — 70553 MRI BRAIN STEM W/O & W/DYE: CPT

## 2023-09-06 PROCEDURE — A9585 GADOBUTROL INJECTION: HCPCS | Mod: JZ | Performed by: FAMILY MEDICINE

## 2023-09-06 PROCEDURE — 85730 THROMBOPLASTIN TIME PARTIAL: CPT | Performed by: EMERGENCY MEDICINE

## 2023-09-06 PROCEDURE — 36415 COLL VENOUS BLD VENIPUNCTURE: CPT | Performed by: EMERGENCY MEDICINE

## 2023-09-06 PROCEDURE — 93005 ELECTROCARDIOGRAM TRACING: CPT | Performed by: EMERGENCY MEDICINE

## 2023-09-06 PROCEDURE — 84484 ASSAY OF TROPONIN QUANT: CPT | Performed by: EMERGENCY MEDICINE

## 2023-09-06 PROCEDURE — 250N000011 HC RX IP 250 OP 636: Performed by: EMERGENCY MEDICINE

## 2023-09-06 PROCEDURE — 255N000002 HC RX 255 OP 636: Mod: JZ | Performed by: FAMILY MEDICINE

## 2023-09-06 PROCEDURE — 82962 GLUCOSE BLOOD TEST: CPT | Performed by: FAMILY MEDICINE

## 2023-09-06 RX ORDER — GADOBUTROL 604.72 MG/ML
7.5 INJECTION INTRAVENOUS ONCE
Status: COMPLETED | OUTPATIENT
Start: 2023-09-06 | End: 2023-09-06

## 2023-09-06 RX ORDER — ASPIRIN 81 MG/1
324 TABLET, CHEWABLE ORAL ONCE
Status: DISCONTINUED | OUTPATIENT
Start: 2023-09-06 | End: 2023-09-06

## 2023-09-06 RX ORDER — MECLIZINE HYDROCHLORIDE 25 MG/1
25 TABLET ORAL 3 TIMES DAILY PRN
Qty: 30 TABLET | Refills: 0 | Status: SHIPPED | OUTPATIENT
Start: 2023-09-06 | End: 2023-09-20

## 2023-09-06 RX ORDER — IOPAMIDOL 755 MG/ML
75 INJECTION, SOLUTION INTRAVASCULAR ONCE
Status: COMPLETED | OUTPATIENT
Start: 2023-09-06 | End: 2023-09-06

## 2023-09-06 RX ADMIN — GADOBUTROL 7.5 ML: 604.72 INJECTION INTRAVENOUS at 16:36

## 2023-09-06 RX ADMIN — IOPAMIDOL 75 ML: 755 INJECTION, SOLUTION INTRAVENOUS at 12:49

## 2023-09-06 ASSESSMENT — ACTIVITIES OF DAILY LIVING (ADL)
ADLS_ACUITY_SCORE: 35

## 2023-09-06 NOTE — ED PROVIDER NOTES
EMERGENCY DEPARTMENT ENCOUNTER      NAME: Aubrey Aldrich  AGE: 62 year old male  YOB: 1960  MRN: [id number]  EVALUATION DATE & TIME: 2023 12:31 PM    PCP: No primary care provider on file.    ED PROVIDER: Tonny Díaz D.O.      Chief Complaint   Patient presents with    Dizziness     Rapid response       FINAL IMPRESSION:  1. Weakness        ED COURSE & MEDICAL DECISION MAKIN:24 PM I met with the patient to gather history and to perform my initial exam. I discussed the plan for care while in the Emergency Department.  12:27 PM Tier 1 stroke code called  12:33 PM spoke with stroke neurology  12:45 PM Spoke with neuroradiologist, non-contrast head CT negative  12:59 PM Stroke code was deescalated  1:04 PM Spoke with Dr. Swenson with stroke neurology who recommends giving the patient 324 mg aspirin.  1:11 PM Rechecked and updated patient on his CT results.  2:30 PM patient care turned over to Dr. Vizcarra    Pertinent Labs & Imaging studies reviewed. (See chart for details)  62 year old male presents to the Emergency Department for evaluation of weakness and lightheadedness, with concern for slurred speech.  Patient reported his speech seemed slurred from baseline.  Neuro exam was otherwise largely unremarkable.  Because of the slurred speech, and onset within an hour of evaluation the emergency department, we did decide to call tier 1 stroke code.  CT and CTA are both negative, and the stroke code was de-escalated by stroke neurology.  Plan at this time is to obtain MRI to ensure there is no evidence of stroke.  There is no obvious infectious process at this patient, EKG did not show any evidence of ischemia arrhythmia, first troponin is negative.  Patient care will be turned over to oncoming provider pending results of MRI imaging and repeat troponin.    Medical Decision Making    History:  Supplemental history from: N/A  External Record(s) reviewed: Documented in chart, if applicable.    Work  Up:  Chart documentation includes differential considered and any EKGs or imaging independently interpreted by provider, where specified.  In additional to work up documented, I considered the following work up: Documented in chart, if applicable.    External consultation:  Discussion of management with another provider: Documented in chart, if applicable    Complicating factors:  Care impacted by chronic illness: N/A  Care affected by social determinants of health: N/A    Disposition considerations: Admission considered. Patient was signed out to the oncoming physician, disposition pending.        At the conclusion of the encounter I discussed the results of all of the tests and the disposition. The questions were answered. The patient or family acknowledged understanding and was agreeable with the care plan.        HPI    Patient information was obtained from: patient and his employer    Use of : N/A       Aubrey Aldrich is a 62 year old male who presents slurred speech    Patient reports he has had brain fog and slurred speech since 11:30 AM this morning after he ate. This has happened before, but it's worse today. He was on methotrexate, taking 8 tablets a day, but he felt that it was making him sick, so he went down to 6 tablets a day. His blood sugar is 100. He reports no numbness, chest pain, shortness of breath, or vision changes.      REVIEW OF SYSTEMS  Constitutional:  Denies fever, chills, weight loss or weakness  Eyes:  No pain, discharge, redness  HENT:  Denies sore throat, ear pain, congestion  Respiratory: No SOB, wheeze or cough  Cardiovascular:  No CP, palpitations  GI:  Denies abdominal pain, nausea, vomiting, diarrhea  : Denies dysuria, hematuria  Musculoskeletal:  Denies any new muscle/joint pain, swelling or loss of function.  Skin:  Denies rash, pallor  Neurologic:  Denies headache, focal weakness or sensory changes. Positive for slurred speech and brain fog  Lymph: Denies swollen  "nodes    All other systems negative unless noted in HPI.    PAST MEDICAL HISTORY:  History reviewed. No pertinent past medical history.    PAST SURGICAL HISTORY:  History reviewed. No pertinent surgical history.      CURRENT MEDICATIONS:    No current facility-administered medications for this encounter.     No current outpatient medications on file.         ALLERGIES:  Allergies   Allergen Reactions    Sulfa Antibiotics        FAMILY HISTORY:  History reviewed. No pertinent family history.    SOCIAL HISTORY:       VITALS:  Patient Vitals for the past 24 hrs:   BP Temp Temp src Pulse Resp SpO2 Height Weight   09/06/23 1257 -- 98.3  F (36.8  C) Oral -- -- -- -- --   09/06/23 1249 -- -- -- -- -- -- 1.803 m (5' 11\") 74.8 kg (165 lb)   09/06/23 1248 (!) 166/79 -- -- 62 (!) 9 99 % -- --   09/06/23 1238 (!) 168/77 -- -- -- -- -- -- --       PHYSICAL EXAM    VITAL SIGNS: BP (!) 166/79   Pulse 62   Temp 98.3  F (36.8  C) (Oral)   Resp (!) 9   Ht 1.803 m (5' 11\")   Wt 74.8 kg (165 lb)   SpO2 99%   BMI 23.01 kg/m      General Appearance: Well-appearing, well-nourished, no acute distress   Head:  Normocephalic, without obvious abnormality, atraumatic  Eyes:  PERRL, conjunctiva/corneas clear, EOM's intact,  ENT:  Lips, mucosa, and tongue normal, membranes are moist without pallor  Neck:  Normal ROM, symmetrical, trachea midline    Cardio:  Regular rate and rhythm, no murmur, rub or gallop, 2+ pulses symmetric in all extremities  Pulm:  Clear to auscultation bilaterally, respirations unlabored,  Abdomen:  Soft, non-tender, no rebound or guarding.  Musculoskeletal: Full ROM, no edema, no cyanosis, good ROM of major joints  Integument:  Warm, Dry, No erythema, No rash.    Neurologic:  Patient is alert and oriented ×3.  Face is symmetric.  Speech is normal.  Visual fields are full.  Cranial nerves II through XII are grossly intact. Motor tone is 5/5 and equal in both upper and lower extremities.  Bilateral symmetric " sensation grossly intact upper and lower.  Neg finger-nose. Neg heel-shin.    Psychiatric:  Affect normal, Judgment normal, Mood normal.      National Institutes of Health Stroke Scale  Exam Interval: Baseline   Score    Level of consciousness: (0)   Alert, keenly responsive    LOC questions: (0)   Answers both questions correctly    LOC commands: (0)   Performs both tasks correctly    Best gaze: (0)   Normal    Visual: (0)   No visual loss    Facial palsy: (0)   Normal symmetrical movements    Motor arm (left): (0)   No drift    Motor arm (right): (0)   No drift    Motor leg (left): (0)   No drift    Motor leg (right): (0)   No drift    Limb ataxia: (0)   Absent    Sensory: (0)   Normal- no sensory loss    Best language: (0)   Normal- no aphasia    Dysarthria: (1)   Mild to moderate dysarthria    Extinction and inattention: (0)   No abnormality        Total Score:  1      LABS  Results for orders placed or performed during the hospital encounter of 09/06/23 (from the past 24 hour(s))   CBC with Platelets & Differential    Narrative    The following orders were created for panel order CBC with Platelets & Differential.  Procedure                               Abnormality         Status                     ---------                               -----------         ------                     CBC with platelets and d...[[id number]]                      Final result                 Please view results for these tests on the individual orders.   Basic metabolic panel   Result Value Ref Range    Sodium 141 136 - 145 mmol/L    Potassium 3.9 3.4 - 5.3 mmol/L    Chloride 104 98 - 107 mmol/L    Carbon Dioxide (CO2) 27 22 - 29 mmol/L    Anion Gap 10 7 - 15 mmol/L    Urea Nitrogen 27.9 (H) 8.0 - 23.0 mg/dL    Creatinine 0.79 0.67 - 1.17 mg/dL    Calcium 9.0 8.8 - 10.2 mg/dL    Glucose 111 (H) 70 - 99 mg/dL    GFR Estimate >90 >60 mL/min/1.73m2   INR   Result Value Ref Range    INR 0.96 0.85 - 1.15   Partial thromboplastin time    Result Value Ref Range    aPTT 32 22 - 38 Seconds   Troponin T, High Sensitivity   Result Value Ref Range    Troponin T, High Sensitivity 9 <=22 ng/L   CBC with platelets and differential   Result Value Ref Range    WBC Count 6.6 4.0 - 11.0 10e3/uL    RBC Count 5.00 4.40 - 5.90 10e6/uL    Hemoglobin 14.3 13.3 - 17.7 g/dL    Hematocrit 42.3 40.0 - 53.0 %    MCV 85 78 - 100 fL    MCH 28.6 26.5 - 33.0 pg    MCHC 33.8 31.5 - 36.5 g/dL    RDW 12.8 10.0 - 15.0 %    Platelet Count 272 150 - 450 10e3/uL    % Neutrophils 64 %    % Lymphocytes 26 %    % Monocytes 7 %    % Eosinophils 1 %    % Basophils 1 %    % Immature Granulocytes 1 %    NRBCs per 100 WBC 0 <1 /100    Absolute Neutrophils 4.4 1.6 - 8.3 10e3/uL    Absolute Lymphocytes 1.7 0.8 - 5.3 10e3/uL    Absolute Monocytes 0.4 0.0 - 1.3 10e3/uL    Absolute Eosinophils 0.0 0.0 - 0.7 10e3/uL    Absolute Basophils 0.0 0.0 - 0.2 10e3/uL    Absolute Immature Granulocytes 0.0 <=0.4 10e3/uL    Absolute NRBCs 0.0 10e3/uL   CTA Head Neck with Contrast    Narrative    EXAM: CTA HEAD NECK W CONTRAST  LOCATION: [facility]  DATE: 9/6/2023    INDICATION: Code Stroke to evaluate for potential thrombolysis and thrombectomy. PLEASE READ IMMEDIATELY. Slurred speech.  COMPARISON: None.  CONTRAST: Isovue 370, 75 mL.  TECHNIQUE: Head and neck CT angiogram with IV contrast. Noncontrast head CT followed by axial helical CT images of the head and neck vessels obtained during the arterial phase of intravenous contrast administration. Axial 2D reconstructed images and   multiplanar 3D MIP reconstructed images of the head and neck vessels were performed by the technologist. Dose reduction techniques were used. All stenosis measurements made according to NASCET criteria unless otherwise specified.    FINDINGS:   NONCONTRAST HEAD CT:   INTRACRANIAL CONTENTS: No finding for intracranial hemorrhage, mass, or acute infarct. Mild prominence of the lateral ventricles. Normal  gray-white matter differentiation. No mass effect or midline shift.    Cerebellar tonsils are normally positioned. Sella is unremarkable for technique. Corpus callosum is normally formed.    VISUALIZED ORBITS/SINUSES/MASTOIDS: No intraorbital abnormality. No paranasal sinus mucosal disease. No middle ear or mastoid effusion.    BONES/SOFT TISSUES: Calvarium is intact, without suspicious lytic or blastic foci.    HEAD CTA:  ANTERIOR CIRCULATION: Normal enhancement is seen within the intracranial internal carotid arteries, anterior cerebral arteries, and middle cerebral arteries. No vascular cutoff, aneurysm, or flow-limiting stenosis.    POSTERIOR CIRCULATION: Left vertebral artery is slightly larger than the right. Basilar artery is normal in appearance. P1 segment of the right posterior cerebral artery is diminutive with majority of flow to the posterior cerebral artery derived via a   large caliber right posterior communicating artery. Small caliber left posterior communicating artery. Posterior cerebral arteries otherwise unremarkable.    DURAL VENOUS SINUSES: Dural venous sinuses are not well evaluated on the basis of this exam. No findings for dural venous sinus thrombosis.    NECK CTA:  RIGHT CAROTID: No measurable stenosis or dissection.    LEFT CAROTID: No measurable stenosis or dissection.    VERTEBRAL ARTERIES: No focal stenosis or dissection. Balanced vertebral arteries.    AORTIC ARCH: Visualized aortic arch and proximal great vessels are unremarkable.    NONVASCULAR STRUCTURES: There is a circumscribed heterogeneously attenuating nodule within the right lobe of the thyroid measuring at least 2.0 x 1.4 cm in size. Further assessment with thyroid ultrasound is suggested. Visualized lung apices are   essentially clear.      Impression    IMPRESSION:   HEAD CT:  1.  No finding for intracranial hemorrhage, mass, or acute infarct.    HEAD CTA:   1.  Developmental variation of the [place] of [place] without  vascular cutoff, aneurysm, or flow-limiting stenosis.    NECK CTA:  1.  No significant stenosis either cervical carotid or vertebral system. No findings for dissection.    2.  A 2 cm heterogeneously attenuating nodule within the right lobe of the thyroid. Further assessment by thyroid ultrasound is suggested if not previously performed.    Noncontrast head CT findings and lack of vascular cutoff on CTA called to Dr. Díaz at 12:44 and 12:49 PM respectively.    REFERENCE:  Herve UGARTE et al. Managing Incidental Thyroid Nodules Detected on Imaging: White Paper of the ACR Incidental Thyroid Findings Committee. JACR 2015; 12:143-150.    Incidental thyroid nodule detected on CT or MRI without suspicious findings. Applies to general population without limited life expectancy or significant comorbidities.    Age greater than or equal to 35 years  Less than 1.5 cm: No further evaluation.  Greater than or equal to 1.5 cm: Evaluate with thyroid ultrasound.         RADIOLOGY  CTA Head Neck with Contrast   Final Result   IMPRESSION:    HEAD CT:   1.  No finding for intracranial hemorrhage, mass, or acute infarct.      HEAD CTA:    1.  Developmental variation of the [place] of [place] without vascular cutoff, aneurysm, or flow-limiting stenosis.      NECK CTA:   1.  No significant stenosis either cervical carotid or vertebral system. No findings for dissection.      2.  A 2 cm heterogeneously attenuating nodule within the right lobe of the thyroid. Further assessment by thyroid ultrasound is suggested if not previously performed.      Noncontrast head CT findings and lack of vascular cutoff on CTA called to Dr. Díaz at 12:44 and 12:49 PM respectively.      REFERENCE:   Herve UGARTE et al. Managing Incidental Thyroid Nodules Detected on Imaging: White Paper of the ACR Incidental Thyroid Findings Committee. JACR 2015; 12:143-150.      Incidental thyroid nodule detected on CT or MRI without suspicious findings. Applies to general population  without limited life expectancy or significant comorbidities.      Age greater than or equal to 35 years   Less than 1.5 cm: No further evaluation.   Greater than or equal to 1.5 cm: Evaluate with thyroid ultrasound.      MR Brain w/o & w Contrast    (Results Pending)        EKG:    Rate: 69 bpm  Rhythm: Normal Sinus Rhythm  Axis: Left  Interval: Normal  Conduction: Incomplete RBBB  QRS: Normal  ST: Normal  T-wave: Normal  QT: Not prolonged  Comparison EKG: no previous available for comparison  Impression:  No acute ischemic change   I have independently reviewed and interpreted today's EKG, pending Cardiologist read          MEDICATIONS GIVEN IN THE EMERGENCY:  Medications   iopamidol (ISOVUE-370) solution 75 mL (75 mLs Intravenous $Given 9/6/23 1525)       NEW PRESCRIPTIONS STARTED AT TODAY'S ER VISIT  New Prescriptions    No medications on file        I, Gale Alejandra, am serving as a scribe to document services personally performed by Tonny Díaz D.O., based on my observations and the provider's statements to me.  I, Tonny Díaz D.O., attest that Gale Alejandra is acting in a scribe capacity, has observed my performance of the services and has documented them in accordance with my direction.     Tonny Díaz D.O.  Emergency Medicine  [facility] EMERGENCY DEPARTMENT  [facility] [facility] [id number] [phone]  Dept: [phone]       Tonny Díaz,   09/06/23 6664

## 2023-09-06 NOTE — ED NOTES
"Rapid Response Team Note    Assessment   In assessment a rapid response was called on Aubrey Aldrich due to  dizzyness . This presentation is likely due to unknown.  Known eye problems in right eye .     Plan   -  eval  -  The  emergency  primary team was  neuro.  -  Disposition: The patient will remain on the current unit. We will continue to monitor this patient closely.  -  Reassessment and plan follow-up will be performed by the  er    Stroke code lena Cha RN  [facility] Paging/Directory    Hospital Course   Brief Summary of events leading to rapid response:   Ate lunch, \"started to feel funny\" went to [employer] and got ibuprophen. Was outside gi lab and suddenly body felt \"funny, speech relaxed, sluggish, had to sit down\"    Admission Diagnosis:   No admission diagnoses are documented for this encounter.    Physical Exam     No data recorded    No data recorded    No data recorded    No data recorded    No data recorded  BP: (!) 168/77 Systolic (24hrs), Av , Min:168 , Max:168   Diastolic (24hrs), Av, Min:77, Max:77     I/Os: No intake/output data recorded.     Exam:   General:  appears wnl  Mental Status: AAOx4.      Significant Results and Procedures   Lactic Acid: No results for input(s): LACT, LACTS in the last 17701 hours.  CBC:   Recent Labs   Lab Test 23  1238   WBC 6.6   HGB 14.3   HCT 42.3           Sepsis Evaluation   The patient is not known to have an infection.      "

## 2023-09-06 NOTE — ED TRIAGE NOTES
Pt arrived from outside gi lab as a rapid response. 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 See note from rapid response team. Pt a/ox4 and no neuro deficts.     Triage Assessment       Row Name 09/06/23 1238       Triage Assessment (Adult)    Airway WDL WDL       Respiratory WDL    Respiratory WDL WDL       Skin Circulation/Temperature WDL    Skin Circulation/Temperature WDL WDL       Cardiac WDL    Cardiac WDL WDL       Peripheral/Neurovascular WDL    Peripheral Neurovascular WDL WDL       Cognitive/Neuro/Behavioral WDL    Cognitive/Neuro/Behavioral WDL WDL

## 2023-09-06 NOTE — CONSULTS
"[facility]    Stroke Telephone Note    I was called by Tonny Díaz on 09/06/23 regarding patient Aubrey Aldrich. The patient is a 62 year old male with no PMHx on file who presents with dizziness and subtle slurred speech.  Imaging reviewed and no acute pathology.      BP (!) 166/79   Pulse 62   Temp 98.3  F (36.8  C) (Oral)   Resp (!) 9   Ht 1.803 m (5' 11\")   Wt 74.8 kg (165 lb)   SpO2 99%   BMI 23.01 kg/m       Stroke Code Data (for stroke code without tele)  Stroke code activated 09/06/23   1233   Stroke provider first response  09/06/23   1240            Last known normal 09/06/23   1130        Time of discovery   (or onset of symptoms) 09/06/23   1130   Head CT read by Stroke Neuro Dr/Provider 09/06/23   1245   Was stroke code de-escalated? Yes 09/06/23 1259          Imaging Findings  CT head: As above  CTA head/neck: as above    Intravenous Thrombolysis  Not given due to:   - minor/isolated/quickly resolving symptoms    Endovascular Treatment  Not initiated due to absence of proximal vessel occlusion    Impression  ? Small stroke causing symptoms    Recommendations   MRI Brain w/w contrast with coronal DWI    My recommendations are based on the information provided over the phone by Aubrey Aldrich's in-person providers. They are not intended to replace the clinical judgment of his in-person providers. I was not requested to personally see or examine the patient at this time.    Chirag Swenson MD, MS  Vascular Neurology    To page me or covering stroke neurology team member, click here: AMCOM  Choose \"On Call\" tab at top, then select \"NEUROLOGY/ALL SITES\" from middle drop-down box, press Enter, then look for \"stroke\" or \"telestroke\" for your site.     I personally spent a total of 30 minutes on September 6, 2023 consulting with his  medical providers and coordinating care.  This includes personally reviewing the patient's medical record including diagnostic testing, " neuroimaging, and laboratory studies.

## 2023-09-06 NOTE — ED NOTES
Glucose taken during RRT, was 100 mg/dl.    Pt arrived to ED with c/oPt c/o vaginal pain, redness, scant bleeding, yellow malodorous drainage, dysuria and less frequency with small amount of urine noted x3 days. Pt denies any fever and itching.

## 2023-09-06 NOTE — ED NOTES
Patient reviewed discharge.  Discussed printed discharge information.  Follow-up appts reviewed.   What s/s warrant return to the ER.    Importance of social distancing, good hand hygiene, and proper primary care follow-up to maintain health.

## 2023-09-07 LAB — GLUCOSE BLDC GLUCOMTR-MCNC: 100 MG/DL (ref 70–99)

## 2023-09-20 ENCOUNTER — OFFICE VISIT (OUTPATIENT)
Dept: INTERNAL MEDICINE | Facility: CLINIC | Age: 63
End: 2023-09-20
Payer: COMMERCIAL

## 2023-09-20 ENCOUNTER — LAB (OUTPATIENT)
Dept: INTERNAL MEDICINE | Facility: CLINIC | Age: 63
End: 2023-09-20

## 2023-09-20 VITALS
HEART RATE: 70 BPM | RESPIRATION RATE: 15 BRPM | HEIGHT: 71 IN | DIASTOLIC BLOOD PRESSURE: 68 MMHG | WEIGHT: 165.2 LBS | BODY MASS INDEX: 23.13 KG/M2 | OXYGEN SATURATION: 96 % | SYSTOLIC BLOOD PRESSURE: 136 MMHG | TEMPERATURE: 97.5 F

## 2023-09-20 DIAGNOSIS — Z00.00 ENCOUNTER FOR GENERAL HEALTH EXAMINATION: ICD-10-CM

## 2023-09-20 DIAGNOSIS — Z12.11 SCREENING FOR COLON CANCER: ICD-10-CM

## 2023-09-20 DIAGNOSIS — Z12.5 SCREENING FOR PROSTATE CANCER: ICD-10-CM

## 2023-09-20 DIAGNOSIS — L40.50 PSORIATIC ARTHRITIS (H): ICD-10-CM

## 2023-09-20 DIAGNOSIS — H26.9 CATARACT OF BOTH EYES, UNSPECIFIED CATARACT TYPE: Primary | ICD-10-CM

## 2023-09-20 DIAGNOSIS — H35.713 CENTRAL SEROUS RETINOPATHY, BILATERAL: Chronic | ICD-10-CM

## 2023-09-20 DIAGNOSIS — E04.1 THYROID NODULE: ICD-10-CM

## 2023-09-20 DIAGNOSIS — G62.9 NEUROPATHY: ICD-10-CM

## 2023-09-20 DIAGNOSIS — R35.0 URINARY FREQUENCY: ICD-10-CM

## 2023-09-20 PROBLEM — R25.2 MUSCLE CRAMPING: Status: RESOLVED | Noted: 2019-08-22 | Resolved: 2023-09-20

## 2023-09-20 LAB
ALBUMIN SERPL BCG-MCNC: 4.4 G/DL (ref 3.5–5.2)
ALBUMIN UR-MCNC: ABNORMAL MG/DL
ALP SERPL-CCNC: 73 U/L (ref 40–129)
ALT SERPL W P-5'-P-CCNC: 16 U/L (ref 0–70)
ANION GAP SERPL CALCULATED.3IONS-SCNC: 10 MMOL/L (ref 7–15)
APPEARANCE UR: CLEAR
AST SERPL W P-5'-P-CCNC: 33 U/L (ref 0–45)
BACTERIA #/AREA URNS HPF: ABNORMAL /HPF
BILIRUB SERPL-MCNC: 0.3 MG/DL
BILIRUB UR QL STRIP: NEGATIVE
BUN SERPL-MCNC: 32.7 MG/DL (ref 8–23)
CALCIUM SERPL-MCNC: 9.2 MG/DL (ref 8.8–10.2)
CHLORIDE SERPL-SCNC: 106 MMOL/L (ref 98–107)
COLOR UR AUTO: YELLOW
CREAT SERPL-MCNC: 0.69 MG/DL (ref 0.67–1.17)
DEPRECATED HCO3 PLAS-SCNC: 26 MMOL/L (ref 22–29)
EGFRCR SERPLBLD CKD-EPI 2021: >90 ML/MIN/1.73M2
ERYTHROCYTE [DISTWIDTH] IN BLOOD BY AUTOMATED COUNT: 12.2 % (ref 10–15)
GLUCOSE SERPL-MCNC: 101 MG/DL (ref 70–99)
GLUCOSE UR STRIP-MCNC: NEGATIVE MG/DL
HCT VFR BLD AUTO: 39.9 % (ref 40–53)
HGB BLD-MCNC: 13.3 G/DL (ref 13.3–17.7)
HGB UR QL STRIP: NEGATIVE
KETONES UR STRIP-MCNC: NEGATIVE MG/DL
LEUKOCYTE ESTERASE UR QL STRIP: NEGATIVE
MCH RBC QN AUTO: 28.5 PG (ref 26.5–33)
MCHC RBC AUTO-ENTMCNC: 33.3 G/DL (ref 31.5–36.5)
MCV RBC AUTO: 85 FL (ref 78–100)
MUCOUS THREADS #/AREA URNS LPF: PRESENT /LPF
NITRATE UR QL: NEGATIVE
PH UR STRIP: 6.5 [PH] (ref 5–8)
PLATELET # BLD AUTO: 290 10E3/UL (ref 150–450)
POTASSIUM SERPL-SCNC: 4.3 MMOL/L (ref 3.4–5.3)
PROT SERPL-MCNC: 7.5 G/DL (ref 6.4–8.3)
PSA SERPL DL<=0.01 NG/ML-MCNC: 2.25 NG/ML (ref 0–4.5)
RBC # BLD AUTO: 4.67 10E6/UL (ref 4.4–5.9)
RBC #/AREA URNS AUTO: ABNORMAL /HPF
SODIUM SERPL-SCNC: 142 MMOL/L (ref 136–145)
SP GR UR STRIP: 1.02 (ref 1–1.03)
SQUAMOUS #/AREA URNS AUTO: ABNORMAL /LPF
T4 FREE SERPL-MCNC: 0.98 NG/DL (ref 0.9–1.7)
TSH SERPL DL<=0.005 MIU/L-ACNC: 7.74 UIU/ML (ref 0.3–4.2)
UROBILINOGEN UR STRIP-ACNC: 1 E.U./DL
WBC # BLD AUTO: 6.4 10E3/UL (ref 4–11)
WBC #/AREA URNS AUTO: ABNORMAL /HPF

## 2023-09-20 PROCEDURE — 36415 COLL VENOUS BLD VENIPUNCTURE: CPT | Performed by: INTERNAL MEDICINE

## 2023-09-20 PROCEDURE — 99214 OFFICE O/P EST MOD 30 MIN: CPT | Performed by: INTERNAL MEDICINE

## 2023-09-20 PROCEDURE — 85027 COMPLETE CBC AUTOMATED: CPT | Performed by: INTERNAL MEDICINE

## 2023-09-20 PROCEDURE — 84439 ASSAY OF FREE THYROXINE: CPT | Performed by: INTERNAL MEDICINE

## 2023-09-20 PROCEDURE — G0103 PSA SCREENING: HCPCS | Performed by: INTERNAL MEDICINE

## 2023-09-20 PROCEDURE — 81001 URINALYSIS AUTO W/SCOPE: CPT | Performed by: INTERNAL MEDICINE

## 2023-09-20 PROCEDURE — 84443 ASSAY THYROID STIM HORMONE: CPT | Performed by: INTERNAL MEDICINE

## 2023-09-20 PROCEDURE — 80053 COMPREHEN METABOLIC PANEL: CPT | Performed by: INTERNAL MEDICINE

## 2023-09-20 RX ORDER — MINERAL OIL/HYDROPHIL PETROLAT
OINTMENT (GRAM) TOPICAL PRN
Qty: 99 G | Refills: 3 | Status: SHIPPED | OUTPATIENT
Start: 2023-09-20 | End: 2024-02-13

## 2023-09-20 NOTE — PROGRESS NOTES
ASSESSMENT AND PLAN:    1. Cataract of both eyes  He may have surgery    2. Central serous retinopathy, bilateral  Ongoing ophthalmologic care    3. Psoriatic arthritis (H)  Asymptomatic.  He quit methotrexate    4. Neuropathy  A persistent mild numbness of feet, Foot exam is negative.  Unclear if somatization or mild neuropathy. Will give topical for HS  - mineral oil-hydrophilic petrolatum (AQUAPHOR) external ointment; Apply topically as needed  Dispense: 99 g; Refill: 3    5. Screening for prostate cancer  - PSA, screen; Future    6. Encounter for general health examination  He is doing well.  Does running to feel well, and tolerates that well.    - CBC with platelets; Future  - Comprehensive metabolic panel; Future    7. Screening for colon cancer  Last screening 2020.   - COLOGUARD([employer]); Future    8. Frequency/nocturia  Will assess with metabolic with metabolic evaluation and UA    9.thyroid nodule - see on Neuroimaging  I can feel a left sided fullness. Will get US.     Follow up in 6 months    CHIEF COMPLAINT:  Foot symptoms    HISTORY OF PRESENT ILLNESS:  Shay Mendosa is a 62 year old male with mild foot numbness, no pain, or weakness of feet.  Working part time now, and running for exercise and tolerates that well. No unusual dyspnea or cough. Does complain of frequency, with nocturia, no dysuria.  No incontinence, this is not new.  He worries about diabetes.     REVIEW OF SYSTEMS:   See HPI, all other systems on review are negative.    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    Other irritable bowel syndrome 04/04/2019    Peripheral neuropathy     Psoriasis     Psoriatic arthritis (H)     Thyroid nodule     Vitamin D deficiency      History   Smoking Status    Never   Smokeless Tobacco  "   Never     Family History   Problem Relation Age of Onset    Alcoholism Father     Heart Disease Father     Colon Polyps Sister      Past Surgical History:   Procedure Laterality Date    FOOT ARTHRODESIS, MODIFIED STERN Bilateral      VITALS:  Vitals:    09/20/23 1550   BP: 136/68   BP Location: Left arm   Patient Position: Sitting   Cuff Size: Adult Large   Pulse: 70   Resp: 15   Temp: 97.5  F (36.4  C)   TempSrc: Tympanic   SpO2: 96%   Weight: 74.9 kg (165 lb 3.2 oz)   Height: 1.803 m (5' 11\")     Wt Readings from Last 3 Encounters:   09/20/23 74.9 kg (165 lb 3.2 oz)   09/06/23 74.8 kg (165 lb)   02/20/23 78.1 kg (172 lb 1.6 oz)     PHYSICAL EXAM:  Constitutional:  In NAD, alert and oriented  Neck: no cervical or axillary adenopathy, there feels like left thyroid fullness  Cardiac:  S1 S2   Lungs: Clear  Abdomen:   Soft, flat and non-tender  Extremities: his feet have good circulation, no edema, well healed bunion scar, minor onychomycosis, and right lateral foot callus, hands reveal no active synovitis.   Neurologic:  Speech clear, gait normal     Psychiatric:  Mood and behavior appropriate, thinking is clear.     DECISION TO OBTAIN OLD RECORDS AND/OR OBTAIN HISTORY FROM SOMEONE OTHER THAN PATIENT, AND/OR ACCESSING CARE EVERYWHERE):  1  reviewed most recent retina specialist evaluation     REVIEW AND SUMMARIZATION OF OLD RECORDS, AND/OR OBTAINING HISTORY FROM SOMEONE OTHER THAN PATIENT, AND/OR DISCUSSION OF CASE WITH ANOTHER HEALTH CARE PROVIDER:  2 reviewed old healthcare notes    REVIEW AND/OR ORDER OF OF CLINICAL LAB TESTS: 1  ordered.    REVIEW AND/OR ORDER OF RADIOLOGY TESTS: 1 reviewed head CT images from ER.    REVIEW AND/OR ORDER OF MEDICAL TESTS (EKG/ECHO/COLONOSCOPY/EGD): 1  ordered cologuard    INDEPENDENT  VISUALIZATION OF IMAGE, TRACING, OR SPECIMEN ITSELF (2 EACH):  0     TOTAL: 6    Current Outpatient Medications   Medication Sig Dispense Refill    mineral oil-hydrophilic petrolatum (AQUAPHOR) " external ointment Apply topically as needed 99 g 3     Samuel Almonte MD  Internal Medicine  [facility]

## 2023-09-21 ENCOUNTER — HOSPITAL ENCOUNTER (OUTPATIENT)
Dept: ULTRASOUND IMAGING | Facility: HOSPITAL | Age: 63
Discharge: HOME | End: 2023-09-21
Attending: INTERNAL MEDICINE | Admitting: INTERNAL MEDICINE
Payer: COMMERCIAL

## 2023-09-21 ENCOUNTER — NURSE TRIAGE (OUTPATIENT)
Dept: NURSING | Facility: CLINIC | Age: 63
End: 2023-09-21

## 2023-09-21 DIAGNOSIS — E04.1 THYROID NODULE: ICD-10-CM

## 2023-09-21 PROCEDURE — 76536 US EXAM OF HEAD AND NECK: CPT

## 2023-09-22 LAB — RADIOLOGIST FLAGS: ABNORMAL

## 2023-09-22 NOTE — TELEPHONE ENCOUNTER
Attempted to contact patient (1/3) to relay information below from Dr Almonte. No answer. Left message to call clinic.    Samuel Almonte MD Meaux, Allison E, RN16 hours ago (5:43 PM)     JR  I have sent his lab results in the mail. Please tell him his lab tests are OK.    The thyroid tests are borderline low, not low enough to treat.  We will want to repeat the thyroid tests in 6 months to make sure they are OK.  Dr. Gage MD     
Nurse Triage SBAR    Situation:   -test result    Background:   -Patient calling, It is okay to leave a detailed message at this number.     Assessment:   -received a call to call back r/t test results  -no provider comment in epic and no documented outbound call  -RN unable to give results due to policy  -call disconnected, RN could only hear static  -FNA RN called patient back, attempted to transfer to midway team - they can not see who had called him - FNA RN to send care team a message    Recommendation:   -Care team: please call patient back and advise on test results    GE DENT, RN on 9/21/2023 at 2:26 PM     Reason for Disposition   Caller requesting routine or non-urgent lab result    Protocols used: PCP Call - No Triage-A-    
3

## 2023-09-28 ENCOUNTER — TELEPHONE (OUTPATIENT)
Dept: INTERNAL MEDICINE | Facility: CLINIC | Age: 63
End: 2023-09-28
Payer: COMMERCIAL

## 2023-09-28 DIAGNOSIS — E04.1 THYROID NODULE: Primary | ICD-10-CM

## 2023-09-28 NOTE — TELEPHONE ENCOUNTER
We spoke about his thyroid nodules, the right one greater than the left one.  He agrees to having FNA.  I have ordered this. Follow up pending the results. Dr. Gage MD

## 2023-10-26 ENCOUNTER — OFFICE VISIT (OUTPATIENT)
Dept: INTERNAL MEDICINE | Facility: CLINIC | Age: 63
End: 2023-10-26
Payer: COMMERCIAL

## 2023-10-26 VITALS
TEMPERATURE: 97.6 F | HEART RATE: 67 BPM | SYSTOLIC BLOOD PRESSURE: 112 MMHG | OXYGEN SATURATION: 97 % | WEIGHT: 169 LBS | RESPIRATION RATE: 16 BRPM | BODY MASS INDEX: 23.66 KG/M2 | HEIGHT: 71 IN | DIASTOLIC BLOOD PRESSURE: 64 MMHG

## 2023-10-26 DIAGNOSIS — R73.03 PREDIABETES: ICD-10-CM

## 2023-10-26 DIAGNOSIS — E16.1 REACTIVE HYPOGLYCEMIA: ICD-10-CM

## 2023-10-26 DIAGNOSIS — E78.5 HYPERLIPIDEMIA LDL GOAL <130: ICD-10-CM

## 2023-10-26 DIAGNOSIS — E03.9 ACQUIRED HYPOTHYROIDISM: ICD-10-CM

## 2023-10-26 DIAGNOSIS — H35.713 CENTRAL SEROUS RETINOPATHY, BILATERAL: Primary | Chronic | ICD-10-CM

## 2023-10-26 LAB
HBA1C MFR BLD: 5.4 % (ref 0–5.6)
T4 FREE SERPL-MCNC: 1.03 NG/DL (ref 0.9–1.7)
TSH SERPL DL<=0.005 MIU/L-ACNC: 7.58 UIU/ML (ref 0.3–4.2)

## 2023-10-26 PROCEDURE — 84439 ASSAY OF FREE THYROXINE: CPT | Performed by: INTERNAL MEDICINE

## 2023-10-26 PROCEDURE — 83036 HEMOGLOBIN GLYCOSYLATED A1C: CPT | Performed by: INTERNAL MEDICINE

## 2023-10-26 PROCEDURE — 99214 OFFICE O/P EST MOD 30 MIN: CPT | Performed by: INTERNAL MEDICINE

## 2023-10-26 PROCEDURE — 84443 ASSAY THYROID STIM HORMONE: CPT | Performed by: INTERNAL MEDICINE

## 2023-10-26 PROCEDURE — 36415 COLL VENOUS BLD VENIPUNCTURE: CPT | Performed by: INTERNAL MEDICINE

## 2023-10-26 NOTE — PROGRESS NOTES
" ASSESSMENT AND PLAN:    1. Central serous retinopathy, bilateral  He seems to have eye discomfort - he calls it 'migraine'. He plans follow up with [facility]. He recently went to optometry and got new glasses    2. Hyperlipidemia LDL goal <130  Need repeat fasting lipid profile.  If LDL remains high, and thyroid functions are addressed, he may be a good candidate for statin therapy, he is reluctant though, concerned about side effects.     3. Prediabetes  Need to r/o given his reports of craving food/hunger symptoms - ?reactive hypoglycemia.     4. R/O hypothyroidism  Elevated TSH but normal free t4.  Need to repeat. If developing hypothyroidism, this could play a role in increased LDL cholesterol levels.  Follow up 6 months.     CHIEF COMPLAINT:  Food craving, eye pain    HISTORY OF PRESENT ILLNESS:  Shay Mendosa is a 62 year old male with pain in right eye at times -\"migraine'  doesn't get new scotomata, or nausea. Recently got new optometry glasses prescription and glasses. He does plan to see the retinal specialists for follow up of his central serous retinopathy. His arthritis remains quiescent.  He is running and tolerates that well.  Still works but part time now.    REVIEW OF SYSTEMS:   See HPI, all other systems on review are negative.    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    Other irritable bowel syndrome 04/04/2019    Peripheral neuropathy     Prediabetes 10/26/2023    Psoriasis     Psoriatic arthritis (H)     Thyroid nodule     Vitamin D deficiency      History   Smoking Status    Never   Smokeless Tobacco    Never     Family History   Problem Relation Age of Onset    Alcoholism Father     Heart Disease Father     Colon Polyps Sister      Past Surgical History:   Procedure " "Laterality Date    FOOT ARTHRODESIS, MODIFIED STERN Bilateral      VITALS:  Vitals:    10/26/23 0845   BP: 112/64   BP Location: Left arm   Patient Position: Sitting   Cuff Size: Adult Regular   Pulse: 67   Resp: 16   Temp: 97.6  F (36.4  C)   TempSrc: Oral   SpO2: 97%   Weight: 76.7 kg (169 lb)   Height: 1.803 m (5' 11\")     Wt Readings from Last 3 Encounters:   10/26/23 76.7 kg (169 lb)   09/20/23 74.9 kg (165 lb 3.2 oz)   09/06/23 74.8 kg (165 lb)     PHYSICAL EXAM:  Constitutional:  In NAD, alert and oriented  EYE: clear,no photophobia, PERRLA, and EOMI  Neck: no cervical or axillary adenopathy  Cardiac:  S1 S2   Lungs: Clear to auscultation  Psychiatric:  Mood and behavior appropriate, thinking is clear.     DECISION TO OBTAIN OLD RECORDS AND/OR OBTAIN HISTORY FROM SOMEONE OTHER THAN PATIENT, AND/OR ACCESSING CARE EVERYWHERE):  1  0     REVIEW AND SUMMARIZATION OF OLD RECORDS, AND/OR OBTAINING HISTORY FROM SOMEONE OTHER THAN PATIENT, AND/OR DISCUSSION OF CASE WITH ANOTHER HEALTH CARE PROVIDER:  2 reviewed past notes    REVIEW AND/OR ORDER OF OF CLINICAL LAB TESTS: 1  reviewed and ordered.    REVIEW AND/OR ORDER OF RADIOLOGY TESTS: 1 0.    REVIEW AND/OR ORDER OF MEDICAL TESTS (EKG/ECHO/COLONOSCOPY/EGD): 1  0    INDEPENDENT  VISUALIZATION OF IMAGE, TRACING, OR SPECIMEN ITSELF (2 EACH):  0     TOTAL: 3    Current Outpatient Medications   Medication Sig Dispense Refill    mineral oil-hydrophilic petrolatum (AQUAPHOR) external ointment Apply topically as needed 99 g 3     Samuel Almonte MD  Internal Medicine  [facility]   "

## 2023-10-31 ENCOUNTER — LAB (OUTPATIENT)
Dept: LAB | Facility: CLINIC | Age: 63
End: 2023-10-31
Payer: COMMERCIAL

## 2023-10-31 DIAGNOSIS — E78.5 HYPERLIPIDEMIA LDL GOAL <130: ICD-10-CM

## 2023-10-31 DIAGNOSIS — R79.89 ELEVATED TSH: Primary | ICD-10-CM

## 2023-10-31 DIAGNOSIS — R79.89 ELEVATED TSH: ICD-10-CM

## 2023-10-31 LAB
CHOLEST SERPL-MCNC: 243 MG/DL
HDLC SERPL-MCNC: 41 MG/DL
LDLC SERPL CALC-MCNC: 181 MG/DL
NONHDLC SERPL-MCNC: 202 MG/DL
T3 SERPL-MCNC: 112 NG/DL (ref 85–202)
T4 SERPL-MCNC: 6.2 UG/DL (ref 4.5–11.7)
TRIGL SERPL-MCNC: 103 MG/DL

## 2023-10-31 PROCEDURE — 36415 COLL VENOUS BLD VENIPUNCTURE: CPT

## 2023-10-31 PROCEDURE — 84480 ASSAY TRIIODOTHYRONINE (T3): CPT

## 2023-10-31 PROCEDURE — 80061 LIPID PANEL: CPT

## 2023-10-31 PROCEDURE — 84436 ASSAY OF TOTAL THYROXINE: CPT

## 2023-11-01 DIAGNOSIS — E04.1 THYROID NODULE: Primary | ICD-10-CM

## 2023-11-25 ENCOUNTER — OFFICE VISIT (OUTPATIENT)
Dept: FAMILY MEDICINE | Facility: CLINIC | Age: 63
End: 2023-11-25
Payer: COMMERCIAL

## 2023-11-25 VITALS
SYSTOLIC BLOOD PRESSURE: 133 MMHG | HEART RATE: 84 BPM | RESPIRATION RATE: 16 BRPM | TEMPERATURE: 98.7 F | WEIGHT: 167.6 LBS | BODY MASS INDEX: 23.38 KG/M2 | OXYGEN SATURATION: 97 % | DIASTOLIC BLOOD PRESSURE: 74 MMHG

## 2023-11-25 DIAGNOSIS — R51.9 PAIN OF CHEEK: Primary | ICD-10-CM

## 2023-11-25 PROCEDURE — 99213 OFFICE O/P EST LOW 20 MIN: CPT

## 2023-11-25 NOTE — PATIENT INSTRUCTIONS
Pt refused 1800 Potassium lab draw.   Stay well hydrated    Massages to left cheek    Warm compresses     Tart candies to produce saliva production if necessary.    OTC medications for discomfort.     Follow up if noting swelling in cheek or worsening discomfort

## 2023-11-25 NOTE — PROGRESS NOTES
"Assessment & Plan     Pain of cheek  Suspect patient bit him self sleep possibly.  Small abrasion noted on left mucosal tissue of the cheek.  Not bleeding however we did discuss staying well-hydrated, warm compresses, tart candies in the instance there may be salivary gland involvement.  Discussed following up if noting fever chills or body aches, cheek swelling.  OTC medications for discomfort.  Patient fully understands and is agreeable with plan of care, at this point patient will follow up as needed unless acute concerns arise in the meantime.          No follow-ups on file.    AURA Pressley CNP  [facility] MARLENE Day is a 63 year old male who presents to clinic today for the following health issues:  Chief Complaint   Patient presents with    Mouth Problem     X2d, L cheek tenderness     HPI    Feels something \"weird\" on left cheek  Unsure if he bit himself in his sleep  No swelling, not pain but uncomfortable  No fever, chills, myalgias.     Review of Systems  Constitutional, HEENT, cardiovascular, pulmonary, endocrine systems are negative, except as otherwise noted.      Objective    /74   Pulse 84   Temp 98.7  F (37.1  C) (Oral)   Resp 16   Wt 76 kg (167 lb 9.6 oz)   SpO2 97%   BMI 23.38 kg/m    Physical Exam  Vitals and nursing note reviewed.   Constitutional:       General: He is not in acute distress.     Appearance: Normal appearance. He is not ill-appearing.   HENT:      Mouth/Throat:      Comments: Small abrasion noted to left cheek mucosa. Not actively bleeding. No pus like drainage or tenderness to salivary gland.   Cardiovascular:      Rate and Rhythm: Normal rate.   Pulmonary:      Effort: Pulmonary effort is normal.   Skin:     General: Skin is warm and dry.   Neurological:      Mental Status: He is alert.   Psychiatric:         Mood and Affect: Mood is anxious.         Behavior: Behavior normal.         Thought Content: Thought content " normal.         Judgment: Judgment normal.

## 2023-12-06 ENCOUNTER — HOSPITAL ENCOUNTER (OUTPATIENT)
Dept: ULTRASOUND IMAGING | Facility: HOSPITAL | Age: 63
Discharge: HOME | End: 2023-12-06
Attending: INTERNAL MEDICINE | Admitting: INTERNAL MEDICINE
Payer: COMMERCIAL

## 2023-12-06 DIAGNOSIS — E04.1 THYROID NODULE: ICD-10-CM

## 2023-12-06 PROCEDURE — 272N000710 US BIOPSY THYROID FINE NEEDLE ASPIRATION

## 2023-12-06 PROCEDURE — 88173 CYTOPATH EVAL FNA REPORT: CPT | Mod: TC | Performed by: INTERNAL MEDICINE

## 2023-12-08 PROCEDURE — 88172 CYTP DX EVAL FNA 1ST EA SITE: CPT | Mod: 26 | Performed by: PATHOLOGY

## 2023-12-08 PROCEDURE — 88173 CYTOPATH EVAL FNA REPORT: CPT | Mod: 26 | Performed by: PATHOLOGY

## 2023-12-09 LAB — NONINV COLON CA DNA+OCC BLD SCRN STL QL: NORMAL

## 2023-12-18 LAB
PATH REPORT.ADDENDUM SPEC: ABNORMAL
PATH REPORT.COMMENTS IMP SPEC: ABNORMAL
PATH REPORT.COMMENTS IMP SPEC: YES
PATH REPORT.FINAL DX SPEC: ABNORMAL
PATH REPORT.GROSS SPEC: ABNORMAL
PATH REPORT.MICROSCOPIC SPEC OTHER STN: ABNORMAL
PATH REPORT.RELEVANT HX SPEC: ABNORMAL

## 2023-12-21 LAB — NONINV COLON CA DNA+OCC BLD SCRN STL QL: NORMAL

## 2023-12-26 LAB — SCANNED LAB RESULT: NORMAL

## 2024-01-10 ENCOUNTER — OFFICE VISIT (OUTPATIENT)
Dept: INTERNAL MEDICINE | Facility: CLINIC | Age: 64
End: 2024-01-10
Payer: COMMERCIAL

## 2024-01-10 VITALS
HEART RATE: 84 BPM | TEMPERATURE: 98 F | BODY MASS INDEX: 23.6 KG/M2 | OXYGEN SATURATION: 99 % | SYSTOLIC BLOOD PRESSURE: 116 MMHG | RESPIRATION RATE: 17 BRPM | HEIGHT: 71 IN | WEIGHT: 168.6 LBS | DIASTOLIC BLOOD PRESSURE: 70 MMHG

## 2024-01-10 DIAGNOSIS — E78.5 HYPERLIPIDEMIA LDL GOAL <130: Primary | ICD-10-CM

## 2024-01-10 DIAGNOSIS — E04.1 THYROID NODULE: ICD-10-CM

## 2024-01-10 PROCEDURE — 99214 OFFICE O/P EST MOD 30 MIN: CPT | Performed by: INTERNAL MEDICINE

## 2024-01-10 NOTE — PROGRESS NOTES
" ASSESSMENT AND PLAN:    1. Hyperlipidemia LDL goal <130  He wants to try diet and will come in for fasting lipid profile in a month or so.     2. Thyroid nodule  Biopsy was negative. We will monitor this.     Follow up 6 months.     CHIEF COMPLAINT:  Follow up thyroid biopsy and hypercholesterolemia    HISTORY OF PRESENT ILLNESS:  Shay Mendosa is a 63 year old male with recent thyroid aspiration biopsy. This was negative for cancer, he feels OK. Wants to work on his diet to try to improve his hyperlipidemia.      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    Other irritable bowel syndrome 04/04/2019    Peripheral neuropathy     Prediabetes 10/26/2023    Psoriasis     Psoriatic arthritis (H)     Reactive hypoglycemia 10/26/2023    Thyroid nodule     Vitamin D deficiency      History   Smoking Status    Never   Smokeless Tobacco    Never     Family History   Problem Relation Age of Onset    Alcoholism Father     Heart Disease Father     Colon Polyps Sister      Past Surgical History:   Procedure Laterality Date    FOOT ARTHRODESIS, MODIFIED STERN Bilateral      Allergies   Allergen Reactions    Sulfa (Sulfonamide Antibiotics) [Sulfa Antibiotics] Unknown    Sulfa Antibiotics      VITALS:  Vitals:    01/10/24 1525   BP: 116/70   BP Location: Left arm   Patient Position: Sitting   Cuff Size: Adult Regular   Pulse: 84   Resp: 17   Temp: 98  F (36.7  C)   TempSrc: Tympanic   SpO2: 99%   Weight: 76.5 kg (168 lb 9.6 oz)   Height: 1.811 m (5' 11.3\")     Estimated body mass index is 23.32 kg/m  as calculated from the following:    Height as of this encounter: 1.811 m (5' 11.3\").    Weight as of this encounter: 76.5 kg (168 lb 9.6 oz).  Wt Readings from Last 3 Encounters:   01/10/24 76.5 kg (168 lb 9.6 oz)   11/25/23 76 " kg (167 lb 9.6 oz)   10/26/23 76.7 kg (169 lb)     PHYSICAL EXAM:  Constitutional:  In NAD, alert and oriented  Neck: no cervical or axillary adenopathy, mild bilateral thyroid enlargement, not tender    DECISION TO OBTAIN OLD RECORDS AND/OR OBTAIN HISTORY FROM SOMEONE OTHER THAN PATIENT, AND/OR ACCESSING CARE EVERYWHERE):  1  0     REVIEW AND SUMMARIZATION OF OLD RECORDS, AND/OR OBTAINING HISTORY FROM SOMEONE OTHER THAN PATIENT, AND/OR DISCUSSION OF CASE WITH ANOTHER HEALTH CARE PROVIDER:  2 reviewed past health notes    REVIEW AND/OR ORDER OF OF CLINICAL LAB TESTS: 1  reviewed biopsy pathology.    REVIEW AND/OR ORDER OF RADIOLOGY TESTS: 1 0.    REVIEW AND/OR ORDER OF MEDICAL TESTS (EKG/ECHO/COLONOSCOPY/EGD): 1  0    INDEPENDENT  VISUALIZATION OF IMAGE, TRACING, OR SPECIMEN ITSELF (2 EACH):  0     TOTAL: 3    Current Outpatient Medications   Medication Sig Dispense Refill    mineral oil-hydrophilic petrolatum (AQUAPHOR) external ointment Apply topically as needed (Patient not taking: Reported on 11/25/2023) 99 g 3     Samuel Almonte MD  Internal Medicine  [facility] [place]

## 2024-01-11 ENCOUNTER — LAB (OUTPATIENT)
Dept: LAB | Facility: CLINIC | Age: 64
End: 2024-01-11
Payer: COMMERCIAL

## 2024-01-11 DIAGNOSIS — E78.5 HYPERLIPIDEMIA LDL GOAL <130: ICD-10-CM

## 2024-01-11 LAB
CHOLEST SERPL-MCNC: 267 MG/DL
FASTING STATUS PATIENT QL REPORTED: YES
HDLC SERPL-MCNC: 41 MG/DL
LDLC SERPL CALC-MCNC: 199 MG/DL
NONHDLC SERPL-MCNC: 226 MG/DL
TRIGL SERPL-MCNC: 136 MG/DL

## 2024-01-11 PROCEDURE — 80061 LIPID PANEL: CPT

## 2024-01-11 PROCEDURE — 36415 COLL VENOUS BLD VENIPUNCTURE: CPT

## 2024-01-23 LAB — NONINV COLON CA DNA+OCC BLD SCRN STL QL: NEGATIVE

## 2024-02-13 ENCOUNTER — OFFICE VISIT (OUTPATIENT)
Dept: FAMILY MEDICINE | Facility: CLINIC | Age: 64
End: 2024-02-13
Payer: COMMERCIAL

## 2024-02-13 VITALS
HEART RATE: 84 BPM | BODY MASS INDEX: 23.65 KG/M2 | HEIGHT: 71 IN | DIASTOLIC BLOOD PRESSURE: 71 MMHG | SYSTOLIC BLOOD PRESSURE: 122 MMHG | OXYGEN SATURATION: 97 % | RESPIRATION RATE: 16 BRPM | TEMPERATURE: 97.9 F | WEIGHT: 168.9 LBS

## 2024-02-13 DIAGNOSIS — L40.8 INVERSE PSORIASIS: Primary | ICD-10-CM

## 2024-02-13 DIAGNOSIS — L40.9 PSORIASIS: ICD-10-CM

## 2024-02-13 PROCEDURE — 99214 OFFICE O/P EST MOD 30 MIN: CPT

## 2024-02-13 RX ORDER — BENZOCAINE/MENTHOL 6 MG-10 MG
LOZENGE MUCOUS MEMBRANE 2 TIMES DAILY
Qty: 15 G | Refills: 0 | Status: SHIPPED | OUTPATIENT
Start: 2024-02-13 | End: 2024-02-23

## 2024-02-13 ASSESSMENT — PAIN SCALES - GENERAL: PAINLEVEL: NO PAIN (0)

## 2024-02-13 NOTE — PATIENT INSTRUCTIONS
[place] [facility]GENC[facility] DEPARTMENT ([facility])  6/21/21  History     Chief Complaint   Patient presents with     Stroke Symptoms     The history is provided by the patient, medical records and a relative. The history is limited by the condition of the patient.     Isabell Matthews is a 61 year old female with a history notable for insulin-dependent DM 2, stroke, TIAs and vascular dementia, CKD stage III, MI, CAD s/p MARY (2004), hypothyroidism, seizure disorder, degenerative arthritis, HTN, and HLD who presents via EMS with her daughter and caregiver for evaluation of right-sided weakness, facial droop and slurred speech.  According to the patient's daughter, the patient appeared well when she put the patient to bed around 1:30 AM last night.  The patient's daughter attempted to arouse patient at 10 AM when they typically wake up with the patient seemed tired so the patient's daughter let her rest.  The patient's daughter then woke the patient up at noon today and immediately noticed slurred speech, right-sided facial droop and right-sided weakness including weakness in her right hands and arms. The patient's daughter assisted the patient while walking and notes the patient was able to raise her right foot off the ground.  Patient's daughter felt the patient was groggy and confused which typically happens when the patient has a low blood glucose level.  Patient's level at that time was 153 and 15 g of carbs were given.  Patient has been compliant on her medications.  The patient is not currently anticoagulated.    I have reviewed the Medications, Allergies, Past Medical and Surgical History, and Social History in the [facility] system.  PAST MEDICAL HISTORY:   Past Medical History:   Diagnosis Date     Chronic pain      Essential hypertension      Ex-cigarette smoker     quit 7/2018 over 35 years     Hyperlipidemia      Hypothyroid      Insulin-requiring or dependent type II diabetes mellitus (H)      Seizures (H)   You have psoriasis.  It is covering a number of areas of your body, but I am most concerned that it is on your scrotum.    I am prescribing you a topical medication called hydrocortisone.  You will apply this to the affected area twice a day for 10 days.  I would like you to apply this to clean and dry skin.  For example, if you shower in the morning, I would like you to shower first, clean your scrotal area well, dry yourself thoroughly upon getting out of the shower, and then put the topical ointment on the affected area.  Do not use this medication for more than 10 days in a row.  Long-term use of this medication can cause some skin breakdown, so it is important that you give yourself medication free periods.  For example, you can use it for 10 days, and then take a break for 10 days.    I have also placed a referral to dermatology.  The medications that you have used in the past to manage her psoriasis have not worked well for you, they will likely have better options to treat your psoriasis.    Please follow-up with your primary care provider as needed for your annual wellness visit.    Seek immediate medical attention with symptoms including fever, open or draining sores, chest pain, shortness of breath, severe pain or swelling in your scrotum, or changes in your urination.       Stroke (H)      Vascular dementia (H)        PAST SURGICAL HISTORY:   Past Surgical History:   Procedure Laterality Date     athroplasty hip Bilateral     , 2006     STENT         Past medical history, past surgical history, medications, and allergies were reviewed with the patient. Additional pertinent items: None    FAMILY HISTORY:   Family History   Problem Relation Age of Onset     Myocardial Infarction Father      Dementia Paternal Grandmother      Heart Disease Paternal Grandmother        SOCIAL HISTORY:   Social History     Tobacco Use     Smoking status: Former Smoker     Packs/day: 0.50     Years: 35.00     Pack years: 17.50     Types: Cigarettes     Quit date: 2018     Years since quittin.9     Smokeless tobacco: Never Used   Substance Use Topics     Alcohol use: Not Currently     Social history was reviewed with the patient. Additional pertinent items: None      Current Discharge Medication List      CONTINUE these medications which have NOT CHANGED    Details   aspirin (ASA) 81 MG EC tablet Take 1 tablet (81 mg) by mouth daily  Qty: 100 tablet, Refills: 3    Comments: Discontinue chewable ASA  Associated Diagnoses: Type 1 diabetes mellitus with other circulatory complication (H)      atorvastatin (LIPITOR) 40 MG tablet Take 1 tablet (40 mg) by mouth daily  Qty: 90 tablet, Refills: 3    Associated Diagnoses: Hyperlipidemia LDL goal <100      blood glucose (NO BRAND SPECIFIED) test strip Use to test blood sugar 4-5 times daily or as directed.  Qty: 400 each, Refills: 6    Associated Diagnoses: Type 1 diabetes mellitus with other circulatory complication (H)      carvedilol (COREG) 3.125 MG tablet Take 1 tablet (3.125 mg) by mouth 2 times daily (with meals)  Qty: 180 tablet, Refills: 3    Associated Diagnoses: Benign essential hypertension      clotrimazole (LOTRIMIN) 1 % external cream Apply topically 2 times daily Until rash resolved  Qty: 60 g, Refills: 3    Associated Diagnoses: Fungal  dermatitis      cyanocobalamin (VITAMIN B-12) 1000 MCG tablet Take 1 tablet (1,000 mcg) by mouth daily  Qty: 100 tablet, Refills: 3    Associated Diagnoses: Vitamin B12 deficiency (non anemic)      diclofenac (VOLTAREN) 1 % topical gel Apply 2 g topically 4 times daily as needed for moderate pain  Qty: 150 g, Refills: 1    Associated Diagnoses: Pain in joint, ankle and foot, right      DULoxetine (CYMBALTA) 20 MG capsule Take 1 capsule (20 mg) by mouth daily  Qty: 90 capsule, Refills: 3    Associated Diagnoses: Fibromyalgia      hydrochlorothiazide (HYDRODIURIL) 25 MG tablet Taper to one half for one week then off if BP less than 140/90  Qty: 90 tablet, Refills: 3    Associated Diagnoses: Benign essential hypertension      insulin aspart (NOVOLOG PEN) 100 UNIT/ML pen Use with meals and at bedtime per sliding scale and carb correction 1 unit for 10 g carbs,  Route: Inject 0-10 Units Subcutaneous 4 times daily (with meals and nightly) Use with meals and at bedtime per sliding scale and carb correction 1 unit for 10 g carbs.  Use 24-48 units per day.  Qty: 45 mL, Refills: 0    Associated Diagnoses: History of insulin dependent diabetes mellitus; Diabetic ketoacidosis without coma associated with type 2 diabetes mellitus (H)      insulin glargine (LANTUS PEN) 100 UNIT/ML pen Inject 26 Units Subcutaneous At Bedtime  Qty: 30 mL, Refills: 11    Comments: If Lantus is not covered by insurance, may substitute Basaglar at same dose and frequency.    Associated Diagnoses: History of insulin dependent diabetes mellitus      insulin pen needle (31G X 8 MM) 31G X 8 MM miscellaneous Use 4 pen needles daily or as directed.  Qty: 300 each, Refills: 4    Associated Diagnoses: History of insulin dependent diabetes mellitus      levETIRAcetam (KEPPRA) 500 MG tablet Take 1 tablet (500 mg) by mouth 2 times daily  Qty: 180 tablet, Refills: 3    Associated Diagnoses: Seizures (H)      loratadine (CLEAR-ATADINE) 10 MG tablet Take 10 mg by  "mouth daily      Melatonin 10 MG TABS tablet Take 10 mg by mouth At Bedtime      NIFEdipine ER (ADALAT CC) 60 MG 24 hr tablet Take 1 tablet (60 mg) by mouth daily  Qty: 90 tablet, Refills: 3    Associated Diagnoses: Benign essential hypertension      pregabalin (LYRICA) 100 MG capsule Take 1 capsule (100 mg) by mouth 2 times daily For additional refills, please schedule a follow-up appointment at [phone]  Qty: 60 capsule, Refills: 0    Associated Diagnoses: Other osteoarthritis of spine, unspecified spinal region; Neuropathy      SYNTHROID 88 MCG tablet Take 1 tablet (88 mcg) by mouth daily  Qty: 90 tablet, Refills: 3    Associated Diagnoses: Acquired hypothyroidism      terbinafine (LAMISIL) 1 % external cream Apply topically to gume of rash twice daily if needed  Qty: 42 g, Refills: 1    Associated Diagnoses: Intertrigo labialis      Vitamin D3 (CHOLECALCIFEROL) 25 mcg (1000 units) tablet Take 1 tablet (25 mcg) by mouth daily  Qty: 100 tablet, Refills: 3    Associated Diagnoses: Benign essential hypertension                Allergies   Allergen Reactions     Pollen Extract Headache        Review of Systems   Neurological: Positive for facial asymmetry, speech difficulty and weakness.     A complete review of systems was performed with pertinent positives and negatives noted in the HPI, and all other systems negative.    Physical Exam   BP: 131/67  Pulse: 116  Temp: 97.8  F (36.6  C)  Resp: 16  Height: 165.1 cm (5' 5\")  Weight: 74 kg (163 lb 2.3 oz)  SpO2: 96 %      Physical Exam  Vitals signs and nursing note reviewed.   Constitutional:       General: She is not in acute distress.     Appearance: She is well-developed. She is not ill-appearing, toxic-appearing or diaphoretic.      Comments: Patient is awake and alert, speech is slurred but she is protecting her airway.  She follows commands and will answer questions but at times is quite slow to answer questions.   HENT:      Head: Normocephalic and " atraumatic.      Mouth/Throat:      Lips: Pink.      Mouth: Mucous membranes are moist.      Pharynx: Oropharynx is clear. No oropharyngeal exudate.   Eyes:      General: Lids are normal. No scleral icterus.     Extraocular Movements: Extraocular movements intact.      Right eye: No nystagmus.      Left eye: No nystagmus.      Conjunctiva/sclera: Conjunctivae normal.      Pupils: Pupils are equal, round, and reactive to light.   Neck:      Musculoskeletal: Normal range of motion and neck supple. No erythema or neck rigidity.      Thyroid: No thyromegaly.      Vascular: No JVD.      Trachea: No tracheal deviation.   Cardiovascular:      Rate and Rhythm: Normal rate and regular rhythm.      Pulses: Normal pulses.      Heart sounds: Normal heart sounds. No murmur. No friction rub. No gallop.    Pulmonary:      Effort: Pulmonary effort is normal. No respiratory distress.      Breath sounds: Normal breath sounds.   Abdominal:      General: Bowel sounds are normal. There is no distension.      Palpations: Abdomen is soft. There is no mass.      Tenderness: There is no abdominal tenderness. There is no guarding or rebound.   Musculoskeletal: Normal range of motion.         General: No tenderness.      Right lower leg: No edema.      Left lower leg: No edema.   Lymphadenopathy:      Cervical: No cervical adenopathy.   Skin:     General: Skin is warm and dry.      Capillary Refill: Capillary refill takes less than 2 seconds.      Coloration: Skin is not pale.      Findings: No erythema or rash.   Neurological:      Mental Status: She is alert. Mental status is at baseline.      Cranial Nerves: Cranial nerve deficit, dysarthria and facial asymmetry present.      Sensory: No sensory deficit.      Motor: Weakness present.      Comments: Right-sided facial droop noted.  Right arm pronator drift noted.  Mild drift of both lower extremities noted which is symmetrical.   Psychiatric:         Speech: Speech is slurred.          Behavior: Behavior is cooperative.         ED Course   4:08 PM  The patient was seen and examined by Dr. Mario Schneider in Room ED 02.      Procedures             EKG Interpretation:      Interpreted by Mario Schneider MD    Symptoms at time of EKG: stroke code   Rhythm: normal sinus   Rate: 89  Ectopy: none  Conduction: normal  ST Segments/ T Waves: No acute ischemic changes  Q Waves: none  Comparison to prior: Unchanged    Clinical Impression: no acute changes                Critical Care Addendum    My initial assessment, based on my review of prehospital provider report, review of nursing observations, review of vital signs, focused history and physical exam, established that Isabell Matthews has focal neurologic abnormalities, which requires immediate intervention, and therefore she is critically ill.     After the initial assessment, the care team initiated multiple lab tests, initiated IV fluid administration and consulted with stroke team to provide stabilization care. Due to the critical nature of this patient, I reassessed nursing observations, vital signs, physical exam, review of cardiac rhythm monitor, 12 lead ECG analysis, interpretation of imaging, mental status and neurologic status multiple times prior to her disposition.     Time also spent performing documentation, discussion with family to obtain medical information for decision making, reviewing test results, discussion with consultants and coordination of care.     Critical care time (excluding teaching time and procedures): 60 minutes.     Labs Ordered and Resulted from Time of ED Arrival Up to the Time of Departure from the ED   CBC WITH PLATELETS DIFFERENTIAL - Abnormal; Notable for the following components:       Result Value    RDW 15.1 (*)     All other components within normal limits   BASIC METABOLIC PANEL - Abnormal; Notable for the following components:    Glucose 250 (*)     Creatinine 1.56 (*)     GFR Estimate 35 (*)     GFR  Estimate If Black 41 (*)     All other components within normal limits   GLUCOSE BY METER - Abnormal; Notable for the following components:    Glucose 258 (*)     All other components within normal limits   CREATININE POCT - Abnormal; Notable for the following components:    Creatinine 1.7 (*)     GFR Estimate 30 (*)     GFR Estimate If Black 37 (*)     All other components within normal limits   GLUCOSE BY METER - Abnormal; Notable for the following components:    Glucose 271 (*)     All other components within normal limits   GLUCOSE MONITOR NURSING POCT   INR   PARTIAL THROMBOPLASTIN TIME   TROPONIN I   SARS-COV-2 (COVID-19) VIRUS RT-PCR   ROUTINE UA WITH MICROSCOPIC   INITIATE   NOTIFY   MEASURE WEIGHT   VITAL SIGNS   NEURO CHECKS   CARDIAC CONTINUOUS MONITORING   PULSE OXIMETRY NURSING   ACTIVITY   HEAD OF BED   IP DYSPHAGIA SCREEN   PERIPHERAL IV CATHETER   IV ACCESS   ISTAT INR POCT   URINE CULTURE AEROBIC BACTERIAL        CTA Head Neck with Contrast   Final Result   Abnormal   Impression:     1. Intraparenchymal hemorrhage centered within the left basal ganglia   with localized mass effect. No intraventricular extension or active   extravasation.   2. Multiple intracranial arterial narrowings suggesting intracranial   atherosclerosis.   3. Approximately 30% narrowing of the proximal left internal carotid   artery.      [Urgent Result: Left basal ganglia intraparenchymal hemorrhage]      Finding was identified on 6/21/2021 4:53 PM.       Dr. Schneider was contacted by Dr. Patricio Rangel at 6/21/2021 5:05 PM   and verbalized understanding of the urgent finding.       I have personally reviewed the examination and initial interpretation   and I agree with the findings.      ARMIDA VALENCIA MD      CT Head w/o Contrast   Final Result   Abnormal   Impression:     1. Intraparenchymal hemorrhage centered within the left basal ganglia   with localized mass effect. No intraventricular extension or active   extravasation.    2. Multiple intracranial arterial narrowings suggesting intracranial   atherosclerosis.   3. Approximately 30% narrowing of the proximal left internal carotid   artery.      [Urgent Result: Left basal ganglia intraparenchymal hemorrhage]      Finding was identified on 6/21/2021 4:53 PM.       Dr. Schneider was contacted by Dr. Patricio Rangel at 6/21/2021 5:05 PM   and verbalized understanding of the urgent finding.       I have personally reviewed the examination and initial interpretation   and I agree with the findings.      ARMIDA VALENCIA MD                 Assessments & Plan (with Medical Decision Making)   This patient was brought to the emergency department via EMS.  History is obtained through patient's daughter.  Patient has new onset right-sided facial droop as well as right arm weakness and worsening confusion along with slurred speech and difficulty swallowing.  Tier 2 stroke code was called as this was a wake-up stroke which is most likely greater than 4-1/2hours but NIH stroke scale in ER was sufficiently elevated to where tier 2 stroke should be called.  Neurology was at bedside and patient went for CT CTA which demonstrated a intraparenchymal hemorrhage was noted in the left basal ganglia area.  Patient was brought back and head of the bed was elevated.  A was confirmed with daughter the patient is full code.  She continued to maintain an airway without difficulty and would answer questions and protect her airway.  She would follow commands and did not have any neurologic deterioration while waiting for her ICU bed.  Blood pressure was watched closely with plans to treat with nicardipine for systolic greater than 140 but blood pressures remained less than 140 systolic so no blood pressure management was initiated in the emergency room.  Patient is already on Keppra some was not loaded with any and epileptics.  Neurology patient will be admitted to the neuro ICU under the stroke service and they will  consult neurosurgery.  This part of the medical record was transcribed by Bob Caba Medical Scribe, from a dictation done by Mike Schneider MD.       I have reviewed the nursing notes.    I have reviewed the findings, diagnosis, plan and need for follow up with the patient.    Current Discharge Medication List          Final diagnoses:   Left-sided nontraumatic intracerebral hemorrhage, unspecified cerebral location (H)     I, Colton Bray, am serving as a trained medical scribe to document services personally performed by Mario Schneider MD, based on the provider's statements to me.      I, Mario Schneider MD, was physically present and have reviewed and verified the accuracy of this note documented by Colton Bray.     6/21/2021   [facility] EMERGENCY DEPARTMENT     Mario Schneider MD  06/22/21 1918

## 2024-02-13 NOTE — PROGRESS NOTES
Assessment & Plan     (L40.8) Inverse psoriasis  (primary encounter diagnosis)  Comment: Poorly controlled. No signs of respiratory distress, vital signs stable, and no red flag signs requiring immediate need for medical attention.  No signs of infection, weeping, or open sores.  Concern is largely dermatological, without any concerns for testicular functional abnormalities and/or urinary tract abnormalities.  Considering rheumatology may be a number of attempts for psoriasis management without success, and they may be noted deferment to dermatology, dermatology seems like the best this time.  In the meantime, a low potency steroid may be helpful to manage patient's symptoms in the interim.  Discussed treatment course, importance of.  Soft between US, follow-up and possible side effects of medication.  Patient attested understanding.  Plan: hydrocortisone (CORTAID) 1 % external cream,         Adult Dermatology  Referral    (L40.9) Psoriasis  Comment: Chronic and poorly controlled.  Previously on a number of different oral and topical medications to manage psoriasis that he stopped due to concern for contribution to unilateral right eye blurriness.  Currently no medical management of psoriasis, and outbreak is extensive across bilateral upper and lower extremities, and spreading to scrotal area.  Previously had seen rheumatology, and they noted that they were deferring care to dermatology at that time.  Patient never followed up with dermatology.  Considering extensive nature and scrotal involvement, it is very important for patient to be assessed and managed by the dermatological specialist who can offer various treatment options that would best suit patient's needs.  Plan: With dermatology for further assessment and management of this concern    Prescription drug management  I spent a total of 24 minutes on the day of the visit.   Time spent by me doing chart review, history and exam, documentation and  further activities per the note      CONSULTATION/REFERRAL to dermatology  FUTURE APPOINTMENTS:       - Follow-up visit in 1 month with PCP if symptoms do not improve       - Follow-up for annual visit or as needed  Patient Instructions   You have psoriasis.  It is covering a number of areas of your body, but I am most concerned that it is on your scrotum.    I am prescribing you a topical medication called hydrocortisone.  You will apply this to the affected area twice a day for 10 days.  I would like you to apply this to clean and dry skin.  For example, if you shower in the morning, I would like you to shower first, clean your scrotal area well, dry yourself thoroughly upon getting out of the shower, and then put the topical ointment on the affected area.  Do not use this medication for more than 10 days in a row.  Long-term use of this medication can cause some skin breakdown, so it is important that you give yourself medication free periods.  For example, you can use it for 10 days, and then take a break for 10 days.    I have also placed a referral to dermatology.  The medications that you have used in the past to manage her psoriasis have not worked well for you, they will likely have better options to treat your psoriasis.    Please follow-up with your primary care provider as needed for your annual wellness visit.    Seek immediate medical attention with symptoms including fever, open or draining sores, chest pain, shortness of breath, severe pain or swelling in your scrotum, or changes in your urination.    Don Day is a 63 year old, presenting for the following health issues:  office visit and Recheck Medication (Pt reports that he is here to discuss possible psoriasis on testicles.)      2/13/2024     1:46 PM   Additional Questions   Roomed by coral   Accompanied by roland         2/13/2024     1:46 PM   Patient Reported Additional Medications   Patient reports taking the following new  "medications none   Shay Carranza is a 59-year-old male with past medical history significant for central retinopathy, peripheral polyneuropathy, irritable bowel syndrome, hyperlipidemia, prediabetes, thyroid nodules, psoriatic arthritis, psoriasis, and anxiety who presents today with concerns for scrotal lesions.  Patient has a history of psoriasis that generally covers his upper extremities and occasionally his face.  He has previously treated this with oral meloxicam and methotrexate, and topical clobetasol.  He discontinued both oral medications with concern for headache and worsening blurry vision in his right eye.  Patient currently does not manage his psoriasis with any medication.  He notes that he first became bothered by scrotal lesions in the past week, but feels that they have likely been present for longer than this.  He notes some mild itching and burning, and general discomfort in his scrotal area.  He reports that he has not done anything to manage this outbreak.  He declines any open sores or lesions, significant scrotal edema, severe scrotal pain, or changes in his urinary habits.  Patient was last seen by rheumatology in 2021, and stopped seeing because he states \"they were not listening to me\".    History of Present Illness       Reason for visit:  Possible psoriasis on testicles    He eats 2-3 servings of fruits and vegetables daily.He consumes 4 sweetened beverage(s) daily.He exercises with enough effort to increase his heart rate 60 or more minutes per day.  He exercises with enough effort to increase his heart rate 7 days per week.   He is taking medications regularly.         Review of Systems  Constitutional, HEENT, cardiovascular, pulmonary, gi and gu systems are negative, except as otherwise noted.      Objective    /71 (BP Location: Left arm, Patient Position: Sitting, Cuff Size: Adult Regular)   Pulse 84   Temp 97.9  F (36.6  C) (Tympanic)   Resp 16   Ht 1.791 m (5' 10.5\")   Wt " 76.6 kg (168 lb 14.4 oz)   SpO2 97%   BMI 23.89 kg/m    Body mass index is 23.89 kg/m .  Physical Exam   GENERAL: alert and no distress  NECK: no adenopathy, no asymmetry, masses, or scars  RESP: lungs clear to auscultation - no rales, rhonchi or wheezes  CV: regular rate and rhythm, normal S1 S2, no S3 or S4, no murmur, click or rub, no peripheral edema  ABDOMEN: soft, nontender, no hepatosplenomegaly, no masses and bowel sounds normal   (male): testicles normal without atrophy or masses, no hernias, and penis normal without urethral discharge  MS: no gross musculoskeletal defects noted, no edema  SKIN: Erythematous pearly plaques across bilateral forearms, bilateral knees and shins, right side of cheek, covering entirety of scrotum, penile shaft, and spreading bilaterally to inguinal area    Le Cai DNP FNP-C  Family Nurse Practitioner - Same Day Provider  [facility] [place]          Signed Electronically by: AURA Pearce CNP

## 2024-06-07 ENCOUNTER — OFFICE VISIT (OUTPATIENT)
Dept: FAMILY MEDICINE | Facility: CLINIC | Age: 64
End: 2024-06-07
Payer: COMMERCIAL

## 2024-06-07 VITALS
TEMPERATURE: 97.5 F | HEIGHT: 71 IN | OXYGEN SATURATION: 99 % | WEIGHT: 166 LBS | DIASTOLIC BLOOD PRESSURE: 73 MMHG | SYSTOLIC BLOOD PRESSURE: 106 MMHG | RESPIRATION RATE: 14 BRPM | HEART RATE: 73 BPM | BODY MASS INDEX: 23.24 KG/M2

## 2024-06-07 DIAGNOSIS — H53.8 BLURRED VISION: Primary | ICD-10-CM

## 2024-06-07 DIAGNOSIS — R51.9 HEADACHE, UNSPECIFIED HEADACHE TYPE: ICD-10-CM

## 2024-06-07 PROCEDURE — 99213 OFFICE O/P EST LOW 20 MIN: CPT | Performed by: NURSE PRACTITIONER

## 2024-06-07 ASSESSMENT — ENCOUNTER SYMPTOMS: EYE PAIN: 1

## 2024-06-07 ASSESSMENT — PAIN SCALES - GENERAL: PAINLEVEL: NO PAIN (0)

## 2024-06-07 NOTE — PROGRESS NOTES
"  Assessment & Plan     Blurred vision  Headache, unspecified headache type    HPI  Shay presents demanding eye x-ray because he has been experiencing blurry vision for the past 8-9 years. As I reviewed his medical record with him he has a diagnosis of central serious retinopathy which I advised to follow up with ophthalmology. He became upset about this recommendation. Keeps saying that this symptoms are ongoing for years and doctors keep telling him he is crazy that there is nothing wrong with him.    Then, daughter helped elaborate his symptoms as he refused to give more details about his history.  Daughter states that Moshe has been experiencing headaches that are persistent.  Shay states he has \"sinus headaches\"  for several years - feels his eyes are hurting due to fluid behind. Feels like has allergies but he does not have allergies  Headaches are across the eyes and temporal area  When he bends forward, it feels funny -pressure  Has been to several doctors for this problem and \"they tell him he is crazy\"   I began to review imaging with patient as he had a sinus XR 2/8/23 demonstrating paranasal sinuses are clear. No fracture. No fluid in the sinuses.  MRI 9/6/23 SINUSES/MASTOIDS: No paranasal sinus mucosal disease. No middle ear or mastoid effusion.                                                                  IMPRESSION:  1.  Normal head MRI.  Patient became upset as I was reviewing imaging results with him stating that \"they only see what they want to see\" and walked out of the room ending visit.  He did not wait to be evaluated or hear further recommendations.               Subjective   Shay is a 63 year old, presenting for the following health issues:  Eye Problem (Right eye is blurry and he has been having headaches. He is having frequent urination.  He is requesting blood work and urine to be tested.)        6/7/2024     8:55 AM   Additional Questions   Accompanied by Yamila daughter " "  HPI  Shay presents demanding eye x-ray because he has been experiencing blurry vision for the past 8-9 years. As I reviewed his medical record with him he has a diagnosis of central serious retinopathy which I advised to the ophthalmology. He became upset about this recommendation. Then daughter helped elaborate his symptoms as he refused to give more details about his history.  Daughter states that Moshe has been experiencing headaches that are persistent.  Shay states he has \"sinus headaches\"  for several years - feels his eyes are hurting due to fluid behind. Feels like has allergies but he does not have allergies  Headaches are across the eyes and temporal area  When he bends forward, it feels funny -pressure  Has been to several doctors for this problem and \"they tell him he is crazy\"   I began to review imaging with patient as he had a sinus XR 2/8/24 demonstrating paranasal sinuses are clear. No fracture. No fluid in the sinuses.  MRI 9/6/23 SINUSES/MASTOIDS: No paranasal sinus mucosal disease. No middle ear or mastoid effusion.                                                                  IMPRESSION:  1.  Normal head MRI.  Patient became upset as I was reviewing imaging results with him stating that \"they only see what they want to see\" and walked out of the room ending visit.      History of Present Illness       Reason for visit:  Eye xray    He eats 2-3 servings of fruits and vegetables daily.He consumes 1 sweetened beverage(s) daily.He exercises with enough effort to increase his heart rate 60 or more minutes per day.  He exercises with enough effort to increase his heart rate 4 days per week.   He is taking medications regularly.                     Objective    /73 (BP Location: Left arm, Patient Position: Sitting, Cuff Size: Adult Regular)   Pulse 73   Temp 97.5  F (36.4  C) (Tympanic)   Resp 14   Ht 1.811 m (5' 11.3\")   Wt 75.3 kg (166 lb)   SpO2 99%   BMI 22.96 kg/m    Body " mass index is 22.96 kg/m .  Physical Exam               Signed Electronically by: AURA Bobby CNP

## 2024-07-24 ENCOUNTER — OFFICE VISIT (OUTPATIENT)
Dept: INTERNAL MEDICINE | Facility: CLINIC | Age: 64
End: 2024-07-24
Payer: COMMERCIAL

## 2024-07-24 VITALS
WEIGHT: 165 LBS | DIASTOLIC BLOOD PRESSURE: 69 MMHG | RESPIRATION RATE: 12 BRPM | OXYGEN SATURATION: 98 % | HEART RATE: 67 BPM | TEMPERATURE: 97.4 F | BODY MASS INDEX: 23.1 KG/M2 | HEIGHT: 71 IN | SYSTOLIC BLOOD PRESSURE: 107 MMHG

## 2024-07-24 DIAGNOSIS — H35.713 CENTRAL SEROUS RETINOPATHY, BILATERAL: Primary | Chronic | ICD-10-CM

## 2024-07-24 DIAGNOSIS — L40.50 PSORIATIC ARTHRITIS (H): ICD-10-CM

## 2024-07-24 DIAGNOSIS — H54.61 DECREASED VISION OF RIGHT EYE: ICD-10-CM

## 2024-07-24 DIAGNOSIS — E04.1 THYROID NODULE: ICD-10-CM

## 2024-07-24 LAB
ALBUMIN SERPL BCG-MCNC: 4.6 G/DL (ref 3.5–5.2)
ALP SERPL-CCNC: 62 U/L (ref 40–150)
ALT SERPL W P-5'-P-CCNC: 25 U/L (ref 0–70)
ANION GAP SERPL CALCULATED.3IONS-SCNC: 11 MMOL/L (ref 7–15)
AST SERPL W P-5'-P-CCNC: 39 U/L (ref 0–45)
BILIRUB SERPL-MCNC: 0.5 MG/DL
BUN SERPL-MCNC: 31.4 MG/DL (ref 8–23)
CALCIUM SERPL-MCNC: 9.4 MG/DL (ref 8.8–10.4)
CHLORIDE SERPL-SCNC: 104 MMOL/L (ref 98–107)
CREAT SERPL-MCNC: 0.87 MG/DL (ref 0.67–1.17)
EGFRCR SERPLBLD CKD-EPI 2021: >90 ML/MIN/1.73M2
ERYTHROCYTE [DISTWIDTH] IN BLOOD BY AUTOMATED COUNT: 12.7 % (ref 10–15)
GLUCOSE SERPL-MCNC: 100 MG/DL (ref 70–99)
HCO3 SERPL-SCNC: 25 MMOL/L (ref 22–29)
HCT VFR BLD AUTO: 42.8 % (ref 40–53)
HGB BLD-MCNC: 14.1 G/DL (ref 13.3–17.7)
MCH RBC QN AUTO: 28.4 PG (ref 26.5–33)
MCHC RBC AUTO-ENTMCNC: 32.9 G/DL (ref 31.5–36.5)
MCV RBC AUTO: 86 FL (ref 78–100)
PLATELET # BLD AUTO: 280 10E3/UL (ref 150–450)
POTASSIUM SERPL-SCNC: 4.3 MMOL/L (ref 3.4–5.3)
PROT SERPL-MCNC: 7.7 G/DL (ref 6.4–8.3)
RBC # BLD AUTO: 4.97 10E6/UL (ref 4.4–5.9)
SODIUM SERPL-SCNC: 140 MMOL/L (ref 135–145)
WBC # BLD AUTO: 6.4 10E3/UL (ref 4–11)

## 2024-07-24 PROCEDURE — 36415 COLL VENOUS BLD VENIPUNCTURE: CPT | Performed by: INTERNAL MEDICINE

## 2024-07-24 PROCEDURE — 80053 COMPREHEN METABOLIC PANEL: CPT | Performed by: INTERNAL MEDICINE

## 2024-07-24 PROCEDURE — 99214 OFFICE O/P EST MOD 30 MIN: CPT | Performed by: INTERNAL MEDICINE

## 2024-07-24 PROCEDURE — 85027 COMPLETE CBC AUTOMATED: CPT | Performed by: INTERNAL MEDICINE

## 2024-07-24 ASSESSMENT — ANXIETY QUESTIONNAIRES
4. TROUBLE RELAXING: NOT AT ALL
3. WORRYING TOO MUCH ABOUT DIFFERENT THINGS: NOT AT ALL
8. IF YOU CHECKED OFF ANY PROBLEMS, HOW DIFFICULT HAVE THESE MADE IT FOR YOU TO DO YOUR WORK, TAKE CARE OF THINGS AT HOME, OR GET ALONG WITH OTHER PEOPLE?: SOMEWHAT DIFFICULT
7. FEELING AFRAID AS IF SOMETHING AWFUL MIGHT HAPPEN: NOT AT ALL
7. FEELING AFRAID AS IF SOMETHING AWFUL MIGHT HAPPEN: NOT AT ALL
GAD7 TOTAL SCORE: 0
6. BECOMING EASILY ANNOYED OR IRRITABLE: NOT AT ALL
GAD7 TOTAL SCORE: 0
2. NOT BEING ABLE TO STOP OR CONTROL WORRYING: NOT AT ALL
5. BEING SO RESTLESS THAT IT IS HARD TO SIT STILL: NOT AT ALL
IF YOU CHECKED OFF ANY PROBLEMS ON THIS QUESTIONNAIRE, HOW DIFFICULT HAVE THESE PROBLEMS MADE IT FOR YOU TO DO YOUR WORK, TAKE CARE OF THINGS AT HOME, OR GET ALONG WITH OTHER PEOPLE: SOMEWHAT DIFFICULT
1. FEELING NERVOUS, ANXIOUS, OR ON EDGE: NOT AT ALL

## 2024-07-24 NOTE — PROGRESS NOTES
ASSESSMENT AND PLAN:    1. Central serous retinopathy, bilateral  Symptomatic R > L.  Eye exam today is negative for acute changes.  He has eye appointment aug 15 and is urged to keep that evaluation.  He wonders about 'allergy', but clinically there is no indication of active allergic rhinitis or sinusitis.     2. Psoriatic arthritis   Remains off methotrexate and seems stable without active synovitis.  Psoriasis, dermatitis on elbows, he feels has responded to oral magnesium     3. Thyroid nodule  S/p FNA with negative results.     4. Decreased vision of right eye  See above.     He would like to have 'blood tests' today    Follow up in 6 months and as needed.     CHIEF COMPLAINT:  Right eye vision changes    HISTORY OF PRESENT ILLNESS:  Shay Mendosa is a 63 year old male with complaint of distorted vision from the right eye. This is not actually new, but he also feels there is some 'pressure' discomfort in that eye and he wonders about 'allergies'.  No nasal drainage or sinus drainage or teeth pain.  He has otherwise been well, takes no medications, and continues to work.     REVIEW OF SYSTEMS:   See HPI, all other systems on review are negative.    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    Other irritable bowel syndrome 04/04/2019    Peripheral neuropathy     Prediabetes 10/26/2023    Psoriasis     Psoriatic arthritis (H)     Reactive hypoglycemia 10/26/2023    Thyroid nodule     Vitamin D deficiency      History   Smoking Status    Never   Smokeless Tobacco    Never     Family History   Problem Relation Age of Onset    Alcoholism Father     Heart Disease Father     Colon Polyps Sister      Past Surgical History:   Procedure Laterality Date    FOOT ARTHRODESIS, MODIFIED STERN Bilateral      Allergies  "  Allergen Reactions    Sulfa (Sulfonamide Antibiotics) [Sulfa Antibiotics] Unknown    Sulfa Antibiotics      VITALS:  Vitals:    07/24/24 1226   BP: 107/69   BP Location: Left arm   Patient Position: Sitting   Cuff Size: Adult Regular   Pulse: 67   Resp: 12   Temp: 97.4  F (36.3  C)   TempSrc: Tympanic   SpO2: 98%   Weight: 74.8 kg (165 lb)   Height: 1.803 m (5' 11\")     Estimated body mass index is 23.01 kg/m  as calculated from the following:    Height as of this encounter: 1.803 m (5' 11\").    Weight as of this encounter: 74.8 kg (165 lb).  Wt Readings from Last 3 Encounters:   07/24/24 74.8 kg (165 lb)   06/07/24 75.3 kg (166 lb)   02/13/24 76.6 kg (168 lb 14.4 oz)     PHYSICAL EXAM:  Constitutional:  In NAD, alert and oriented  HEENT: nose and throat clear, ears normal  EYE: fundi are unremarkable, no inflammation or photophobia  Neck: no cervical or axillary adenopathy  Cardiac:  S1 S2   Lungs: Clear   Abdomen:   Soft, flat and non-tender   Psychiatric:  Mood and behavior are appropriate, thinking is clear.     DECISION TO OBTAIN OLD RECORDS AND/OR OBTAIN HISTORY FROM SOMEONE OTHER THAN PATIENT, AND/OR ACCESSING CARE EVERYWHERE):  1  0     REVIEW AND SUMMARIZATION OF OLD RECORDS, AND/OR OBTAINING HISTORY FROM SOMEONE OTHER THAN PATIENT, AND/OR DISCUSSION OF CASE WITH ANOTHER HEALTH CARE PROVIDER:  2 reviewed past health notes    REVIEW AND/OR ORDER OF OF CLINICAL LAB TESTS: 1  ordered.    REVIEW AND/OR ORDER OF RADIOLOGY TESTS: 1 0.    REVIEW AND/OR ORDER OF MEDICAL TESTS (EKG/ECHO/COLONOSCOPY/EGD): 1  0    INDEPENDENT  VISUALIZATION OF IMAGE, TRACING, OR SPECIMEN ITSELF (2 EACH):  0     TOTAL: 3    Samuel Almonte MD  Internal Medicine  [facility]   "

## 2024-07-24 NOTE — LETTER
July 28, 2024      Shay Mendosa  [address]   [place] [address]        Dear ,    We are writing to inform you of your test results.    Your tests are all good.     Resulted Orders   CBC with platelets   Result Value Ref Range    WBC Count 6.4 4.0 - 11.0 10e3/uL    RBC Count 4.97 4.40 - 5.90 10e6/uL    Hemoglobin 14.1 13.3 - 17.7 g/dL    Hematocrit 42.8 40.0 - 53.0 %    MCV 86 78 - 100 fL    MCH 28.4 26.5 - 33.0 pg    MCHC 32.9 31.5 - 36.5 g/dL    RDW 12.7 10.0 - 15.0 %    Platelet Count 280 150 - 450 10e3/uL   Comprehensive metabolic panel   Result Value Ref Range    Sodium 140 135 - 145 mmol/L    Potassium 4.3 3.4 - 5.3 mmol/L    Carbon Dioxide (CO2) 25 22 - 29 mmol/L    Anion Gap 11 7 - 15 mmol/L    Urea Nitrogen 31.4 (H) 8.0 - 23.0 mg/dL    Creatinine 0.87 0.67 - 1.17 mg/dL    GFR Estimate >90 >60 mL/min/1.73m2      Comment:      eGFR calculated using 2021 CKD-EPI equation.    Calcium 9.4 8.8 - 10.4 mg/dL      Comment:      Reference intervals for this test were updated on 7/16/2024 to reflect our healthy population more accurately. There may be differences in the flagging of prior results with similar values performed with this method. Those prior results can be interpreted in the context of the updated reference intervals.    Chloride 104 98 - 107 mmol/L    Glucose 100 (H) 70 - 99 mg/dL    Alkaline Phosphatase 62 40 - 150 U/L    AST 39 0 - 45 U/L    ALT 25 0 - 70 U/L    Protein Total 7.7 6.4 - 8.3 g/dL    Albumin 4.6 3.5 - 5.2 g/dL    Bilirubin Total 0.5 <=1.2 mg/dL       If you have any questions or concerns, please call the clinic at the number listed above.       Sincerely,      Samuel Almonte MD

## 2024-08-02 ENCOUNTER — TRANSFERRED RECORDS (OUTPATIENT)
Dept: HEALTH INFORMATION MANAGEMENT | Facility: CLINIC | Age: 64
End: 2024-08-02
Payer: COMMERCIAL

## 2024-09-07 ENCOUNTER — HEALTH MAINTENANCE LETTER (OUTPATIENT)
Age: 64
End: 2024-09-07

## 2025-01-05 ENCOUNTER — HEALTH MAINTENANCE LETTER (OUTPATIENT)
Age: 65
End: 2025-01-05

## 2025-01-08 ENCOUNTER — ANCILLARY PROCEDURE (OUTPATIENT)
Dept: GENERAL RADIOLOGY | Facility: CLINIC | Age: 65
End: 2025-01-08
Attending: INTERNAL MEDICINE
Payer: COMMERCIAL

## 2025-01-08 ENCOUNTER — OFFICE VISIT (OUTPATIENT)
Dept: INTERNAL MEDICINE | Facility: CLINIC | Age: 65
End: 2025-01-08
Payer: COMMERCIAL

## 2025-01-08 VITALS
TEMPERATURE: 97.8 F | OXYGEN SATURATION: 98 % | HEART RATE: 71 BPM | RESPIRATION RATE: 16 BRPM | HEIGHT: 71 IN | BODY MASS INDEX: 24.93 KG/M2 | DIASTOLIC BLOOD PRESSURE: 78 MMHG | WEIGHT: 178.1 LBS | SYSTOLIC BLOOD PRESSURE: 134 MMHG

## 2025-01-08 DIAGNOSIS — G44.209 TENSION HEADACHE: ICD-10-CM

## 2025-01-08 DIAGNOSIS — E78.5 HYPERLIPIDEMIA LDL GOAL <130: Primary | ICD-10-CM

## 2025-01-08 DIAGNOSIS — Z12.5 SCREENING FOR PROSTATE CANCER: ICD-10-CM

## 2025-01-08 DIAGNOSIS — R73.03 PREDIABETES: ICD-10-CM

## 2025-01-08 DIAGNOSIS — L40.50 PSORIATIC ARTHRITIS (H): ICD-10-CM

## 2025-01-08 DIAGNOSIS — H35.713 CENTRAL SEROUS RETINOPATHY, BILATERAL: Chronic | ICD-10-CM

## 2025-01-08 DIAGNOSIS — F79 INTELLECTUAL FUNCTIONING DISABILITY: Chronic | ICD-10-CM

## 2025-01-08 LAB
LDLC SERPL DIRECT ASSAY-MCNC: 182 MG/DL
PSA SERPL DL<=0.01 NG/ML-MCNC: 2.64 NG/ML (ref 0–4.5)

## 2025-01-08 PROCEDURE — 83721 ASSAY OF BLOOD LIPOPROTEIN: CPT | Performed by: INTERNAL MEDICINE

## 2025-01-08 PROCEDURE — 36415 COLL VENOUS BLD VENIPUNCTURE: CPT | Performed by: INTERNAL MEDICINE

## 2025-01-08 PROCEDURE — G0103 PSA SCREENING: HCPCS | Performed by: INTERNAL MEDICINE

## 2025-01-08 PROCEDURE — G2211 COMPLEX E/M VISIT ADD ON: HCPCS | Performed by: INTERNAL MEDICINE

## 2025-01-08 PROCEDURE — 72040 X-RAY EXAM NECK SPINE 2-3 VW: CPT | Mod: TC | Performed by: STUDENT IN AN ORGANIZED HEALTH CARE EDUCATION/TRAINING PROGRAM

## 2025-01-08 PROCEDURE — 99214 OFFICE O/P EST MOD 30 MIN: CPT | Performed by: INTERNAL MEDICINE

## 2025-01-08 PROCEDURE — 99417 PROLNG OP E/M EACH 15 MIN: CPT | Performed by: INTERNAL MEDICINE

## 2025-01-08 RX ORDER — ROSUVASTATIN CALCIUM 10 MG/1
10 TABLET, COATED ORAL DAILY
Qty: 90 TABLET | Refills: 3 | Status: SHIPPED | OUTPATIENT
Start: 2025-01-08

## 2025-01-08 ASSESSMENT — PAIN SCALES - GENERAL: PAINLEVEL_OUTOF10: NO PAIN (0)

## 2025-01-08 NOTE — PROGRESS NOTES
ASSESSMENT AND PLAN:    1. Hyperlipidemia LDL goal <130 (Primary)  LDL has been elevated.  He agrees to rosuvastatin 10 mg po daily. LDL today    2. Central serous retinopathy, bilateral  Has not seen eye since 8/2024, urged to follow up, he is due in early March. He has distorted vision out of his right eye, mainly, and has been diagnosed with central serous retinopathy left eye, he has not wanted to take the spironolactone that they have recommended, he did try it for awhile, and thought he had side effects.     3. Psoriatic arthritis   Not on remittitive therapy, symptoms seem stable.      4. Psoriasis  Skin disease is stable.     5. Intellectual functioning disability  He has retired now, and lives with his ex wife, they care for their son with intellectual disability, severe    6. Chronic headache  He feels a chronic bifrontal headache.  Nearly constant, usually mild, some increase when he bends over and then stands up.  Some neck stiffness, no arm or leg symptoms, or balance issues, does tolerate jogging well.  He thinks it is 'sinus headache'.  Sinus xray 2/23 negative. CTA head and neck 9/23 also negative. He was seen then in ER for brain fog and slurred speech, work up was negative. ( He was drinking a lot of caffeine at that time).  Cervical Spine xray today:     7. Screening for prostate cancer  PSA today    Follow up 3 months and as needed.     CHIEF COMPLAINT:    Shay is a 64 year old, presenting for the following health issues:  OFFICE VISIT (Patient reports they are here for referral to ENT. Reports getting headaches 'all the time' and flu-like symptoms on and off since 2015. Reports that symptoms are a Methotrexate side effect - similar to an upper sinus congestion. Reports no one can seem to find root of problem but would like to try ENT. Reports R eye inflammation - can't see it but can feel the pressure, sometimes irritating L eye. Reports vision is 'foggy' and gets sinus headaches.  ")    HISTORY OF PRESENT ILLNESS:  Shay Mendosa is a 64 year old male has had 10 pound weight gain since he quit working.  Chronic headaches as above.     REVIEW OF SYSTEMS:   See HPI, all other systems on review are negative.    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    Other irritable bowel syndrome 04/04/2019    Peripheral neuropathy     Prediabetes 10/26/2023    Psoriasis     Psoriatic arthritis (H)     Reactive hypoglycemia 10/26/2023    Thyroid nodule     Vitamin D deficiency      History   Smoking Status    Never   Smokeless Tobacco    Never     Family History   Problem Relation Age of Onset    Alcoholism Father     Heart Disease Father     Colon Polyps Sister      Past Surgical History:   Procedure Laterality Date    FOOT ARTHRODESIS, MODIFIED STERN Bilateral      Allergies   Allergen Reactions    Sulfa (Sulfonamide Antibiotics) [Sulfa Antibiotics] Unknown    Sulfa Antibiotics      VITALS:  Vitals:    01/08/25 0845 01/08/25 0850   BP: (!) 152/81 134/78   BP Location: Left arm Left arm   Patient Position: Sitting Sitting   Cuff Size: Adult Regular Adult Regular   Pulse: 71    Resp: 16    Temp: 97.8  F (36.6  C)    TempSrc: Temporal    SpO2: 98%    Weight: 80.8 kg (178 lb 1.6 oz)    Height: 1.803 m (5' 11\")      Estimated body mass index is 24.84 kg/m  as calculated from the following:    Height as of this encounter: 1.803 m (5' 11\").    Weight as of this encounter: 80.8 kg (178 lb 1.6 oz).  Wt Readings from Last 3 Encounters:   01/08/25 80.8 kg (178 lb 1.6 oz)   07/24/24 74.8 kg (165 lb)   06/07/24 75.3 kg (166 lb)     PHYSICAL EXAM:  Constitutional:  In NAD, alert and oriented  SKIN: diffuse small areas of mild psoriasis, including the elbows  HEENT: nose and throat clear, ears normal  EYE: fundi " are unremarkable  Neck: no cervical or axillary adenopathy  Cardiac:  S1 S2   Lungs: Clear     DECISION TO OBTAIN OLD RECORDS AND/OR OBTAIN HISTORY FROM SOMEONE OTHER THAN PATIENT, AND/OR ACCESSING CARE EVERYWHERE):  1  reviewed 9/6/2024 ER evaluation     REVIEW AND SUMMARIZATION OF OLD RECORDS, AND/OR OBTAINING HISTORY FROM SOMEONE OTHER THAN PATIENT, AND/OR DISCUSSION OF CASE WITH ANOTHER HEALTH CARE PROVIDER:  2 reviewed past health notes    REVIEW AND/OR ORDER OF OF CLINICAL LAB TESTS: 1  ordered.    REVIEW AND/OR ORDER OF RADIOLOGY TESTS: 1 ordered and reviewed head MRI, CTA head and neck, xray sinus.    REVIEW AND/OR ORDER OF MEDICAL TESTS (EKG/ECHO/COLONOSCOPY/EGD): 1  0    INDEPENDENT  VISUALIZATION OF IMAGE, TRACING, OR SPECIMEN ITSELF (2 EACH):  2 personally viewed and interpreted the cervical spine xray and reviewed the films with the patient.      TOTAL: 6    Samuel Almonte MD  Internal Medicine  [facility]

## 2025-01-08 NOTE — LETTER
January 8, 2025      Shay Mendosa  [address]   [place] [address]        Dear ,    We are writing to inform you of your test results.    Your cholesterol level is somewhat high.  You could take medication to lower it.  The PSA, the prostate test, is good.     Resulted Orders   PSA, screen   Result Value Ref Range    Prostate Specific Antigen Screen 2.64 0.00 - 4.50 ng/mL    Narrative    This result is obtained using the Roche Elecsys total PSA method on the cas e801 immunoassay analyzer, which is an ultrasensitive method. Results obtained with different assay methods or kits cannot be used interchangeably.  This test is intended for initial prostate cancer screening. PSA values exceeding the age-specific limits are suspicious for prostate disease, but additional testing, such as prostate biopsy, is needed to diagnose prostate pathology. The American Cancer Society recommends annual examination with digital rectal examination and serum PSA beginning at age 50 and for men with a life expectancy of at least 10 years after detection of prostate cancer. For men in high-risk groups, such as  Americans or men with a first-degree relative diagnosed at a younger age, testing should begin at a younger age. It is generally recommended that information be provided to patients about the benefits and limitations of testing and treatment so they can make informed decisions.   LDL cholesterol direct   Result Value Ref Range    LDL Cholesterol Direct 182 (H) <100 mg/dL      Comment:      Age 2-19 years:  Desirable: < 110 mg/dL   Borderline High: 110-129 mg/dL   High: >= 130 mg/dL    Age 20 years and older:  Desirable: < 100 mg/dL  Above Desirable: 100-129 mg/dL   Borderline High: 130-159 mg/dL   High: 160-189 mg/dL   Very High: >= 190 mg/dL       If you have any questions or concerns, please call the clinic at the number listed above.       Sincerely,      Samuel Almonte MD    Electronically  signed

## 2025-01-20 ENCOUNTER — HOSPITAL ENCOUNTER (OUTPATIENT)
Dept: CT IMAGING | Facility: HOSPITAL | Age: 65
Discharge: HOME | End: 2025-01-20
Attending: INTERNAL MEDICINE | Admitting: INTERNAL MEDICINE
Payer: COMMERCIAL

## 2025-01-20 DIAGNOSIS — G44.209 TENSION HEADACHE: ICD-10-CM

## 2025-01-20 PROCEDURE — 70486 CT MAXILLOFACIAL W/O DYE: CPT

## 2025-01-21 DIAGNOSIS — R51.9 SINUS HEADACHE: Primary | ICD-10-CM

## 2025-01-23 NOTE — TELEPHONE ENCOUNTER
FUTURE VISIT INFORMATION      FUTURE VISIT INFORMATION:  Date: 4/3/25  Time: 4:10PM  Location: [facility]  REFERRAL INFORMATION:  Referring provider:  Samuel Almonte MD  Referring providers clinic:  [facility]  Reason for visit/diagnosis  Sinus headache  chronic sinus headache please evaluate, CT is done  REF BY Samuel Almonte MD  RECS IN EPIC  CONF LOC  SCHED W/PT     RECORDS REQUESTED FROM:       Clinic name Comments Records Status Imaging Status   [facility] 1/8/25- Ov w. Samuel Almonte MD Epic     Imaging  1/20/25- CT Sinus   9/6/23- mr brain + CTA Head Neck   2/8/23- XR Sinus  [facility]  PACS

## 2025-02-13 ENCOUNTER — TELEPHONE (OUTPATIENT)
Dept: OTOLARYNGOLOGY | Facility: CLINIC | Age: 65
End: 2025-02-13
Payer: COMMERCIAL

## 2025-02-13 NOTE — TELEPHONE ENCOUNTER
Writer called and spoke to pt regarding up coming appointment with Dr. Hansen. Writer stated that Dr. Hansen has reviewed pts CT scan and pts sinuses are clear and are most likely not contributing to his headaches. Pt stated that he would like to be scoped to see if anything is in his sinuses. Writer stated that we can keep the appointment with Dr. Hansen but it may be beneficial for pt to reach back out to his PCP to have a neurology referral placed. Pt stated that he does not think neurology will help . Pt wants to keep appointment with Dr. Hansen..      Juanita Winter RN

## 2025-03-04 ENCOUNTER — TELEPHONE (OUTPATIENT)
Dept: INTERNAL MEDICINE | Facility: CLINIC | Age: 65
End: 2025-03-04

## 2025-03-04 NOTE — TELEPHONE ENCOUNTER
"Pt had an appt on 3/4 but he canceled it due to his blurry eyes but forgot he canceled it and still came in today, he has a appt that he rescheduled for 3/11.        Then pt came back in and wanted me to give you this bottle of \"Folic adic 1MG\" because he is wondering why it made him sick. Rheumatologist put him on methotrexate and folic acid but looking at the bottle the start date is 5/10/2017, unsure if he is still taking it.  "

## 2025-03-05 NOTE — TELEPHONE ENCOUNTER
Left voicemail for patient stating that if he has further questions he may call the clinic. Message below is unclear if patient needs immediate assistance. Provided call back number. Closing encounter.

## 2025-03-25 ENCOUNTER — OFFICE VISIT (OUTPATIENT)
Dept: INTERNAL MEDICINE | Facility: CLINIC | Age: 65
End: 2025-03-25
Payer: COMMERCIAL

## 2025-03-25 VITALS
SYSTOLIC BLOOD PRESSURE: 128 MMHG | RESPIRATION RATE: 15 BRPM | WEIGHT: 181.1 LBS | BODY MASS INDEX: 25.35 KG/M2 | HEART RATE: 70 BPM | HEIGHT: 71 IN | TEMPERATURE: 96.9 F | OXYGEN SATURATION: 99 % | DIASTOLIC BLOOD PRESSURE: 82 MMHG

## 2025-03-25 DIAGNOSIS — L40.50 PSORIATIC ARTHRITIS (H): ICD-10-CM

## 2025-03-25 DIAGNOSIS — E78.5 HYPERLIPIDEMIA LDL GOAL <130: Primary | ICD-10-CM

## 2025-03-25 DIAGNOSIS — R73.03 PREDIABETES: ICD-10-CM

## 2025-03-25 DIAGNOSIS — L40.8 OTHER PSORIASIS: Chronic | ICD-10-CM

## 2025-03-25 DIAGNOSIS — G47.9 SLEEP DISORDER: ICD-10-CM

## 2025-03-25 DIAGNOSIS — H35.713 CENTRAL SEROUS RETINOPATHY, BILATERAL: Chronic | ICD-10-CM

## 2025-03-25 DIAGNOSIS — F79 INTELLECTUAL FUNCTIONING DISABILITY: Chronic | ICD-10-CM

## 2025-03-25 LAB
CHOLEST SERPL-MCNC: 272 MG/DL
EST. AVERAGE GLUCOSE BLD GHB EST-MCNC: 114 MG/DL
FASTING STATUS PATIENT QL REPORTED: YES
HBA1C MFR BLD: 5.6 % (ref 0–5.6)
HDLC SERPL-MCNC: 41 MG/DL
LDLC SERPL CALC-MCNC: 204 MG/DL
NONHDLC SERPL-MCNC: 231 MG/DL
TRIGL SERPL-MCNC: 135 MG/DL

## 2025-03-25 PROCEDURE — 83036 HEMOGLOBIN GLYCOSYLATED A1C: CPT | Performed by: INTERNAL MEDICINE

## 2025-03-25 PROCEDURE — 80061 LIPID PANEL: CPT | Performed by: INTERNAL MEDICINE

## 2025-03-25 PROCEDURE — 99214 OFFICE O/P EST MOD 30 MIN: CPT | Performed by: INTERNAL MEDICINE

## 2025-03-25 PROCEDURE — 3074F SYST BP LT 130 MM HG: CPT | Performed by: INTERNAL MEDICINE

## 2025-03-25 PROCEDURE — 3079F DIAST BP 80-89 MM HG: CPT | Performed by: INTERNAL MEDICINE

## 2025-03-25 PROCEDURE — G2211 COMPLEX E/M VISIT ADD ON: HCPCS | Performed by: INTERNAL MEDICINE

## 2025-03-25 PROCEDURE — 36415 COLL VENOUS BLD VENIPUNCTURE: CPT | Performed by: INTERNAL MEDICINE

## 2025-03-25 NOTE — PROGRESS NOTES
ASSESSMENT AND PLAN:    1. Hyperlipidemia LDL goal <130 (Primary)  He feels that the statin treatment caused tendency to sleep disorder - trouble falling asleep.  He has no other risk factors.  Doesn't want to retry for now.     2. Central serous retinopathy, bilateral  Ongoing eye care.  He feels he couldn't tolerate spironolactone, and does have some right eye vision symptoms.      3. Prediabetes  Has not really had elevated A1c levels, will re-check.     4. Intellectual functioning disability  Stable. No indication of psychosis, tolerating detention well, runs often and tolerates this.     5. Psoriatic arthritis  Seems in remission without further treatment.     6. Psoriasis  Diffuse, stable.      7. Vasomotor rhinitis  Symptoms triggered by cold weather, and exercise.  Not progressive.  Doesn't want treatment.     Note:  he very commonly feels that medications given him side effects, and he ends of stopping them.      The longitudinal plan of care for the diagnosis(es)/condition(s) as documented were addressed during this visit. Due to the added complexity in care, I will continue to support Shay in the subsequent management and with ongoing continuity of care.    Follow up 6 months.     CHIEF COMPLAINT:  Follow up    HISTORY OF PRESENT ILLNESS:  Shay Mendosa is a 64 year old male can't tolerate statin or the eye medication (spironolactone) Poor vision out of his Right eye. Not really new.  Says his psoriasis is stable. Does get leg cramps at times.  No dyspnea or cough, no bowel or bladder issues.  He still runs and tolerates that well.  Doesn't want to take medications. He wants his lipids rechecked.     REVIEW OF SYSTEMS:   See HPI, all other systems on review are negative.    Past Medical History:   Diagnosis Date    Anxiety     chronic stress/intellectually disabled son    Autism spectrum disorder     Cataract     Central serous retinopathy     Intellectual disability     high functioning/works  "maintenance [employer]    Other irritable bowel syndrome 04/04/2019    Peripheral neuropathy     Prediabetes 10/26/2023    Psoriasis     Psoriatic arthritis (H)     Reactive hypoglycemia 10/26/2023    Spondylosis of cervical region without myelopathy or radiculopathy     advanced at C5-6    Thyroid nodule     Vitamin D deficiency      History   Smoking Status    Never   Smokeless Tobacco    Never     Family History   Problem Relation Age of Onset    Alcoholism Father     Heart Disease Father     Colon Polyps Sister      Past Surgical History:   Procedure Laterality Date    FOOT ARTHRODESIS, MODIFIED STERN Bilateral      Allergies   Allergen Reactions    Sulfa (Sulfonamide Antibiotics) [Sulfa Antibiotics] Unknown    Sulfa Antibiotics      VITALS:  Vitals:    03/25/25 0818   BP: 128/82   BP Location: Left arm   Patient Position: Sitting   Cuff Size: Adult Regular   Pulse: 70   Resp: 15   Temp: 96.9  F (36.1  C)   TempSrc: Tympanic   SpO2: 99%   Weight: 82.1 kg (181 lb 1.6 oz)   Height: 1.803 m (5' 11\")     Estimated body mass index is 25.26 kg/m  as calculated from the following:    Height as of this encounter: 1.803 m (5' 11\").    Weight as of this encounter: 82.1 kg (181 lb 1.6 oz).  Wt Readings from Last 3 Encounters:   03/25/25 82.1 kg (181 lb 1.6 oz)   01/08/25 80.8 kg (178 lb 1.6 oz)   07/24/24 74.8 kg (165 lb)     PHYSICAL EXAM:  Constitutional:  In NAD, alert and oriented  Cardiac:  S1 S2   Lungs: Clear   Psychiatric:  Mood and behavior are appropriate, thinking is halting, and at times slow, but without psychosis, or flight of ideas.  He stays within the conversation.     DECISION TO OBTAIN OLD RECORDS AND/OR OBTAIN HISTORY FROM SOMEONE OTHER THAN PATIENT, AND/OR ACCESSING CARE EVERYWHERE):  1  0     REVIEW AND SUMMARIZATION OF OLD RECORDS, AND/OR OBTAINING HISTORY FROM SOMEONE OTHER THAN PATIENT, AND/OR DISCUSSION OF CASE WITH ANOTHER HEALTH CARE PROVIDER:  2 reviewed past health notes    REVIEW AND/OR " ORDER OF OF CLINICAL LAB TESTS: 1  ordered.    REVIEW AND/OR ORDER OF RADIOLOGY TESTS: 1 0.    REVIEW AND/OR ORDER OF MEDICAL TESTS (EKG/ECHO/COLONOSCOPY/EGD): 1  0    INDEPENDENT  VISUALIZATION OF IMAGE, TRACING, OR SPECIMEN ITSELF (2 EACH):  0     TOTAL: 3    Samuel Almonte MD  Internal Medicine  [facility]

## 2025-04-03 ENCOUNTER — PRE VISIT (OUTPATIENT)
Dept: OTOLARYNGOLOGY | Facility: CLINIC | Age: 65
End: 2025-04-03

## 2025-05-06 ENCOUNTER — PATIENT OUTREACH (OUTPATIENT)
Dept: CARE COORDINATION | Facility: CLINIC | Age: 65
End: 2025-05-06
Payer: COMMERCIAL

## 2025-08-12 ENCOUNTER — OFFICE VISIT (OUTPATIENT)
Dept: FAMILY MEDICINE | Facility: CLINIC | Age: 65
End: 2025-08-12
Payer: COMMERCIAL

## 2025-08-12 VITALS
DIASTOLIC BLOOD PRESSURE: 76 MMHG | RESPIRATION RATE: 16 BRPM | HEIGHT: 71 IN | BODY MASS INDEX: 25.2 KG/M2 | HEART RATE: 69 BPM | WEIGHT: 180 LBS | SYSTOLIC BLOOD PRESSURE: 136 MMHG | TEMPERATURE: 99 F | OXYGEN SATURATION: 97 %

## 2025-08-12 DIAGNOSIS — R51.9 HEADACHE, UNSPECIFIED HEADACHE TYPE: ICD-10-CM

## 2025-08-12 DIAGNOSIS — J30.0 VASOMOTOR RHINITIS: ICD-10-CM

## 2025-08-12 DIAGNOSIS — H35.711 CENTRAL SEROUS CHORIORETINOPATHY OF RIGHT EYE: Primary | ICD-10-CM

## 2025-08-12 DIAGNOSIS — E78.5 HYPERLIPIDEMIA LDL GOAL <130: ICD-10-CM

## 2025-08-12 RX ORDER — ROSUVASTATIN CALCIUM 5 MG/1
5 TABLET, COATED ORAL DAILY
Qty: 30 TABLET | Refills: 1 | Status: SHIPPED | OUTPATIENT
Start: 2025-08-12

## 2025-09-02 ENCOUNTER — OFFICE VISIT (OUTPATIENT)
Dept: FAMILY MEDICINE | Facility: CLINIC | Age: 65
End: 2025-09-02
Payer: COMMERCIAL

## 2025-09-02 VITALS
HEIGHT: 71 IN | HEART RATE: 82 BPM | OXYGEN SATURATION: 97 % | DIASTOLIC BLOOD PRESSURE: 83 MMHG | WEIGHT: 179 LBS | RESPIRATION RATE: 16 BRPM | TEMPERATURE: 98.5 F | SYSTOLIC BLOOD PRESSURE: 123 MMHG | BODY MASS INDEX: 25.06 KG/M2

## 2025-09-02 DIAGNOSIS — H35.713 CENTRAL SEROUS RETINOPATHY, BILATERAL: Chronic | ICD-10-CM

## 2025-09-02 DIAGNOSIS — F41.1 GENERALIZED ANXIETY DISORDER: ICD-10-CM

## 2025-09-02 DIAGNOSIS — F79 INTELLECTUAL FUNCTIONING DISABILITY: Chronic | ICD-10-CM

## 2025-09-02 DIAGNOSIS — E78.5 HYPERLIPIDEMIA LDL GOAL <130: Primary | ICD-10-CM

## 2025-09-02 RX ORDER — ROSUVASTATIN CALCIUM 10 MG/1
10 TABLET, COATED ORAL DAILY
Qty: 30 TABLET | Refills: 1 | Status: SHIPPED | OUTPATIENT
Start: 2025-09-02

## 2025-09-03 DIAGNOSIS — J30.0 VASOMOTOR RHINITIS: ICD-10-CM

## 2025-09-03 RX ORDER — CETIRIZINE HYDROCHLORIDE 10 MG/1
10 TABLET ORAL DAILY
Qty: 30 TABLET | Refills: 2 | Status: SHIPPED | OUTPATIENT
Start: 2025-09-03